# Patient Record
Sex: FEMALE | Race: WHITE | NOT HISPANIC OR LATINO | Employment: OTHER | URBAN - NONMETROPOLITAN AREA
[De-identification: names, ages, dates, MRNs, and addresses within clinical notes are randomized per-mention and may not be internally consistent; named-entity substitution may affect disease eponyms.]

---

## 2017-02-21 ENCOUNTER — HOSPITAL ENCOUNTER (OUTPATIENT)
Dept: LAB | Facility: MEDICAL CENTER | Age: 82
End: 2017-02-21
Attending: INTERNAL MEDICINE
Payer: MEDICARE

## 2017-02-21 ENCOUNTER — OFFICE VISIT (OUTPATIENT)
Dept: MEDICAL GROUP | Facility: PHYSICIAN GROUP | Age: 82
End: 2017-02-21
Payer: MEDICARE

## 2017-02-21 VITALS
HEIGHT: 62 IN | SYSTOLIC BLOOD PRESSURE: 128 MMHG | TEMPERATURE: 97.2 F | BODY MASS INDEX: 27.75 KG/M2 | DIASTOLIC BLOOD PRESSURE: 84 MMHG | HEART RATE: 67 BPM | OXYGEN SATURATION: 95 % | WEIGHT: 150.8 LBS

## 2017-02-21 DIAGNOSIS — E03.9 HYPOTHYROIDISM, UNSPECIFIED TYPE: ICD-10-CM

## 2017-02-21 DIAGNOSIS — M54.50 CHRONIC BILATERAL LOW BACK PAIN WITHOUT SCIATICA: ICD-10-CM

## 2017-02-21 DIAGNOSIS — E55.9 VITAMIN D DEFICIENCY DISEASE: ICD-10-CM

## 2017-02-21 DIAGNOSIS — I10 ESSENTIAL HYPERTENSION: ICD-10-CM

## 2017-02-21 DIAGNOSIS — G89.29 CHRONIC BILATERAL LOW BACK PAIN WITHOUT SCIATICA: ICD-10-CM

## 2017-02-21 DIAGNOSIS — M25.562 CHRONIC PAIN OF LEFT KNEE: ICD-10-CM

## 2017-02-21 DIAGNOSIS — G89.29 CHRONIC PAIN OF LEFT KNEE: ICD-10-CM

## 2017-02-21 DIAGNOSIS — C91.10 CLL (CHRONIC LYMPHOCYTIC LEUKEMIA) (HCC): ICD-10-CM

## 2017-02-21 LAB
25(OH)D3 SERPL-MCNC: 31 NG/ML (ref 30–100)
ALBUMIN SERPL BCP-MCNC: 3.6 G/DL (ref 3.2–4.9)
ALBUMIN/GLOB SERPL: 1 G/DL
ALP SERPL-CCNC: 57 U/L (ref 30–99)
ALT SERPL-CCNC: 5 U/L (ref 2–50)
ANION GAP SERPL CALC-SCNC: 4 MMOL/L (ref 0–11.9)
AST SERPL-CCNC: 14 U/L (ref 12–45)
BILIRUB SERPL-MCNC: 0.4 MG/DL (ref 0.1–1.5)
BUN SERPL-MCNC: 18 MG/DL (ref 8–22)
CALCIUM SERPL-MCNC: 9.3 MG/DL (ref 8.5–10.5)
CHLORIDE SERPL-SCNC: 104 MMOL/L (ref 96–112)
CO2 SERPL-SCNC: 30 MMOL/L (ref 20–33)
CREAT SERPL-MCNC: 1 MG/DL (ref 0.5–1.4)
GLOBULIN SER CALC-MCNC: 3.7 G/DL (ref 1.9–3.5)
GLUCOSE SERPL-MCNC: 83 MG/DL (ref 65–99)
LDH SERPL L TO P-CCNC: 145 U/L (ref 107–266)
POTASSIUM SERPL-SCNC: 4.2 MMOL/L (ref 3.6–5.5)
PROT SERPL-MCNC: 7.3 G/DL (ref 6–8.2)
SODIUM SERPL-SCNC: 138 MMOL/L (ref 135–145)
T4 FREE SERPL-MCNC: 1.09 NG/DL (ref 0.53–1.43)
TSH SERPL DL<=0.005 MIU/L-ACNC: 2.65 UIU/ML (ref 0.3–3.7)
URATE SERPL-MCNC: 6.6 MG/DL (ref 1.9–8.2)

## 2017-02-21 PROCEDURE — 1036F TOBACCO NON-USER: CPT | Performed by: INTERNAL MEDICINE

## 2017-02-21 PROCEDURE — G8432 DEP SCR NOT DOC, RNG: HCPCS | Performed by: INTERNAL MEDICINE

## 2017-02-21 PROCEDURE — 36415 COLL VENOUS BLD VENIPUNCTURE: CPT

## 2017-02-21 PROCEDURE — 82784 ASSAY IGA/IGD/IGG/IGM EACH: CPT

## 2017-02-21 PROCEDURE — 99214 OFFICE O/P EST MOD 30 MIN: CPT | Performed by: INTERNAL MEDICINE

## 2017-02-21 PROCEDURE — 84443 ASSAY THYROID STIM HORMONE: CPT

## 2017-02-21 PROCEDURE — G8484 FLU IMMUNIZE NO ADMIN: HCPCS | Performed by: INTERNAL MEDICINE

## 2017-02-21 PROCEDURE — 82306 VITAMIN D 25 HYDROXY: CPT

## 2017-02-21 PROCEDURE — 83615 LACTATE (LD) (LDH) ENZYME: CPT

## 2017-02-21 PROCEDURE — 82785 ASSAY OF IGE: CPT

## 2017-02-21 PROCEDURE — 82784 ASSAY IGA/IGD/IGG/IGM EACH: CPT | Mod: 91

## 2017-02-21 PROCEDURE — 1101F PT FALLS ASSESS-DOCD LE1/YR: CPT | Performed by: INTERNAL MEDICINE

## 2017-02-21 PROCEDURE — 4040F PNEUMOC VAC/ADMIN/RCVD: CPT | Mod: 8P | Performed by: INTERNAL MEDICINE

## 2017-02-21 PROCEDURE — G8420 CALC BMI NORM PARAMETERS: HCPCS | Performed by: INTERNAL MEDICINE

## 2017-02-21 PROCEDURE — 84550 ASSAY OF BLOOD/URIC ACID: CPT

## 2017-02-21 PROCEDURE — 80053 COMPREHEN METABOLIC PANEL: CPT

## 2017-02-21 PROCEDURE — 84439 ASSAY OF FREE THYROXINE: CPT

## 2017-02-21 NOTE — MR AVS SNAPSHOT
"        JITENDRA Seed   2017 10:40 AM   Office Visit   MRN: [id number]    Department:  [facility]   Dept Phone:  [phone]    Description:  Female : 1934   Provider:  Nina Smith M.D.           Reason for Visit     Follow-Up CLL      Allergies as of 2017     No Known Allergies      You were diagnosed with     Hypothyroidism, unspecified type   [0189220]       Essential hypertension   [6782392]       Vitamin D deficiency disease   [055242]       CLL (chronic lymphocytic leukemia) (CMS-HCC)   [415467]       Chronic bilateral low back pain without sciatica   [6969149]       Chronic pain of left knee   [746840]         Vital Signs     Blood Pressure Pulse Temperature Height Weight Body Mass Index    128/84 mmHg 67 36.2 °C (97.2 °F) 1.575 m (5' 2\") 68.402 kg (150 lb 12.8 oz) 27.57 kg/m2    Oxygen Saturation Smoking Status                95% Former Smoker          Basic Information     Date Of Birth Sex Race Ethnicity Preferred Language    1934 Female White Non- English      Problem List              ICD-10-CM Priority Class Noted - Resolved    Glaucoma H40.9   2015 - Present    Hypertension I10   2015 - Present    Hypothyroidism E03.9   2015 - Present    HSV (herpes simplex virus) infection B00.9   2015 - Present    Osteoarthritis M19.90   2015 - Present    Fall W19.XXXA   3/9/2015 - Present    Hyperlipidemia E78.5   2015 - Present    Renal insufficiency N28.9   2015 - Present    CKD (chronic kidney disease) N18.9   2016 - Present    Vitamin D deficiency disease E55.9   2016 - Present    Post-nasal drip R09.82   2016 - Present    Fatigue due to depression F32.9, R53.83   3/19/2016 - Present    CLL (chronic lymphocytic leukemia) (CMS-HCC) C91.10   3/22/2016 - Present    Left knee pain M25.562   3/29/2016 - Present    Chronic fatigue R53.82   3/29/2016 - Present    Chronic back pain M54.9, G89.29   2016 - Present      "   Health Maintenance        Date Due Completion Dates    IMM DTaP/Tdap/Td Vaccine (1 - Tdap) 11/19/1953 ---    IMM ZOSTER VACCINE 11/19/1994 ---    BONE DENSITY 11/19/1999 ---    IMM PNEUMOCOCCAL 65+ (ADULT) HIGH/HIGHEST RISK SERIES (1 of 2 - PCV13) 11/19/1999 ---    IMM INFLUENZA (1) 9/1/2016 ---            Current Immunizations     No immunizations on file.      Below and/or attached are the medications your provider expects you to take. Review all of your home medications and newly ordered medications with your provider and/or pharmacist. Follow medication instructions as directed by your provider and/or pharmacist. Please keep your medication list with you and share with your provider. Update the information when medications are discontinued, doses are changed, or new medications (including over-the-counter products) are added; and carry medication information at all times in the event of emergency situations     Allergies:  No Known Allergies          Medications  Valid as of: February 21, 2017 - 11:05 AM    Generic Name Brand Name Tablet Size Instructions for use    Acyclovir (Ointment) ZOVIRAX 5 % Apply 1 Application to affected area(s) every 3 hours.        Azithromycin (Tab) ZITHROMAX 250 MG Use as package directs        Cholecalciferol (Tab) cholecalciferol 1000 UNIT Take 4,000 Units by mouth every day.        Cyanocobalamin   Take  by mouth.        Ergocalciferol (Cap) DRISDOL 83028 UNIT Take 1 Cap by mouth every 7 days.        Estrogens, Conjugated (Cream) PREMARIN 0.625 MG/GM         Guaifenesin-Codeine (Solution) ROBITUSSIN -10 mg/5mL Take 5 mL by mouth at bedtime as needed for Cough.        Hydrocodone-Acetaminophen (Tab) NORCO 5-325 MG Take 1 Tab by mouth every 8 hours as needed (severe pain).        Levothyroxine Sodium (Tab) SYNTHROID 50 MCG Take 1 Tab by mouth every day.        Levothyroxine Sodium (Tab) SYNTHROID 75 MCG Take 1 Tab by mouth Every morning on an empty stomach.         Lisinopril (Tab) PRINIVIL 20 MG Take 1 Tab by mouth every day.        Lysine HCl (Tab) lysine 500 MG Take 500 mg by mouth every day.        Timolol Maleate (Solution) TIMOPTIC 0.25 % Place 1 Drop in both eyes 2 times a day.        Timolol Maleate (Solution) TIMOPTIC 0.5 %         Travoprost (Solution) TRAVATAN Z 0.004 %         .                 Medicines prescribed today were sent to:     [facility] [place] - [address] [address]    Phone: [phone] Fax: [phone]    Open 24 Hours?: No      Medication refill instructions:       If your prescription bottle indicates you have medication refills left, it is not necessary to call your provider’s office. Please contact your pharmacy and they will refill your medication.    If your prescription bottle indicates you do not have any refills left, you may request refills at any time through one of the following ways: The online [facility] system (except Urgent Care), by calling your provider’s office, or by asking your pharmacy to contact your provider’s office with a refill request. Medication refills are processed only during regular business hours and may not be available until the next business day. Your provider may request additional information or to have a follow-up visit with you prior to refilling your medication.   *Please Note: Medication refills are assigned a new Rx number when refilled electronically. Your pharmacy may indicate that no refills were authorized even though a new prescription for the same medication is available at the pharmacy. Please request the medicine by name with the pharmacy before contacting your provider for a refill.        Your To Do List     Future Labs/Procedures Complete By Expires    COMP METABOLIC PANEL  As directed 2/21/2018    FREE THYROXINE  As directed 2/21/2018    LDH  As directed 2/21/2018    TSH  As directed 2/21/2018    URIC ACID  As directed 2/21/2018    VITAMIN D,25 HYDROXY  As  directed 2/21/2018      Instructions    1. Have nonfasting labs checked today.    2. Follow up in 6 months of sooner as needed.    3. Dr. Smith will fax labs to Dr. Miller.          MyChart Status: Patient Declined

## 2017-02-21 NOTE — PATIENT INSTRUCTIONS
1. Have nonfasting labs checked today.    2. Follow up in 6 months of sooner as needed.    3. Dr. Smith will fax labs to Dr. Miller.

## 2017-02-21 NOTE — ASSESSMENT & PLAN NOTE
At her last visit, patient was having some back pain. She was given #15 tabs of hydrocodone. She notes that she has stopped doing activities that bother her back and since then her pain is much improved.

## 2017-02-21 NOTE — ASSESSMENT & PLAN NOTE
Patient has left knee osteoarthritis and has follow-up with Dr. Alejo orthopedics in the past. She notes that she's had some episodic worsening pain that that has improved over the past year. Patient was recommended to follow-up with Dr. Alejo if needed.

## 2017-02-21 NOTE — ASSESSMENT & PLAN NOTE
Patient has CLL which has been followed by Dr. Miller. She was posted see him today but she does not have the money for co-pay for the appointment. We discussed checking the labs that he requested back in August and faxing a copy to him.

## 2017-02-21 NOTE — PROGRESS NOTES
Chief Complaint   Patient presents with   • Follow-Up     CLL       HISTORY OF PRESENT ILLNESS: Patient is a 82 y.o. female established patient who presents today to be seen for acute and chronic issues.    Left knee pain  Patient has left knee osteoarthritis and has follow-up with Dr. Alejo orthopedics in the past. She notes that she's had some episodic worsening pain that that has improved over the past year. Patient was recommended to follow-up with Dr. Alejo if needed.    Chronic back pain  At her last visit, patient was having some back pain. She was given #15 tabs of hydrocodone. She notes that she has stopped doing activities that bother her back and since then her pain is much improved.    CLL (chronic lymphocytic leukemia) (CMS-HCC)  Patient has CLL which has been followed by Dr. Miller. She was posted see him today but she does not have the money for co-pay for the appointment. We discussed checking the labs that he requested back in August and faxing a copy to him.    Hypertension  This is a chronic condition which is well controlled on medications. Patient is tolerating medications without side effects.    Hypothyroidism  This is a chronic condition which is well controlled on medications. Patient is tolerating medications without side effects.    Vitamin D deficiency disease  This is a chronic condition which is well controlled on medications. Patient is tolerating medications without side effects.      Patient Active Problem List    Diagnosis Date Noted   • Chronic back pain 08/18/2016   • Left knee pain 03/29/2016   • Chronic fatigue 03/29/2016   • CLL (chronic lymphocytic leukemia) (CMS-HCC) 03/22/2016   • Fatigue due to depression 03/19/2016   • CKD (chronic kidney disease) 02/18/2016   • Vitamin D deficiency disease 02/18/2016   • Post-nasal drip 02/18/2016   • Hyperlipidemia 08/18/2015   • Renal insufficiency 08/18/2015   • Fall 03/09/2015   • Glaucoma 02/17/2015   • Hypertension 02/17/2015   •  Hypothyroidism 02/17/2015   • HSV (herpes simplex virus) infection 02/17/2015   • Osteoarthritis 02/17/2015       Allergies:Review of patient's allergies indicates no known allergies.    Current Outpatient Prescriptions Ordered in [facility]   Medication Sig Dispense Refill   • levothyroxine (SYNTHROID) 50 MCG Tab Take 1 Tab by mouth every day. 90 Tab 3   • lisinopril (PRINIVIL) 20 MG Tab Take 1 Tab by mouth every day. 90 Tab 3   • timolol (TIMOPTIC) 0.5 % Solution      • timolol (TIMOPTIC) 0.25 % Solution Place 1 Drop in both eyes 2 times a day.     • ergocalciferol (DRISDOL) 16980 UNIT capsule Take 1 Cap by mouth every 7 days. 12 Cap 0   • Cyanocobalamin (VITAMIN B 12 PO) Take  by mouth.     • vitamin D (CHOLECALCIFEROL) 1000 UNIT Tab Take 4,000 Units by mouth every day.     • lysine 500 MG TABS Take 500 mg by mouth every day.     • TRAVATAN Z 0.004 % SOLN      • azithromycin (ZITHROMAX) 250 MG Tab Use as package directs 6 Tab 0   • guaifenesin-codeine (ROBITUSSIN AC) Solution oral solution Take 5 mL by mouth at bedtime as needed for Cough. 120 mL 0   • levothyroxine (SYNTHROID) 75 MCG Tab Take 1 Tab by mouth Every morning on an empty stomach. 90 Tab 3   • hydrocodone-acetaminophen (NORCO) 5-325 MG Tab per tablet Take 1 Tab by mouth every 8 hours as needed (severe pain). 30 Tab 0   • PREMARIN 0.625 MG/GM CREA      • acyclovir (ZOVIRAX) 5 % OINT Apply 1 Application to affected area(s) every 3 hours.       No current [facility]-ordered facility-administered medications on file.       Past Medical History   Diagnosis Date   • H/O scarlet fever      age 10   • Hypertension    • Hyperlipidemia    • HSV-1 infection    • HSV-2 infection    • CLL (chronic lymphocytic leukemia) (CMS-[facility]) 3/22/2016       Social History   Substance Use Topics   • Smoking status: Former Smoker -- 0.25 packs/day for 51 years     Types: Cigarettes   • Smokeless tobacco: Never Used   • Alcohol Use: No      Comment: h/o of alcohol abuse       Family Status  "  Relation Status Death Age   • Mother     • Father     • Sister Alive      Family History   Problem Relation Age of Onset   • Heart Disease Father    • Heart Attack Father    • Alcohol/Drug Father    • Cancer Sister      skin, not melanoma   • Diabetes Neg Hx    • Stroke Neg Hx        ROS:  Review of Systems   Constitutional: Negative for fever and malaise/fatigue.   HENT: Negative for congestion  Respiratory: Negative for cough  Cardiovascular: Negative for chest pain  Gastrointestinal: Negative for nausea, vomiting and abdominal pain.  Musculoskeletal: **Positive for episodic left knee pain  All other systems reviewed and are negative except as in HPI.      Exam:  Blood pressure 128/84, pulse 67, temperature 36.2 °C (97.2 °F), height 1.575 m (5' 2\"), weight 68.402 kg (150 lb 12.8 oz), SpO2 95 %.  General:  Well nourished, well developed elderly female in NAD  Head is grossly normal.  Neck: Supple without JVD   Pulmonary: Clear to ausculation and percussion.  Normal effort. No rales, ronchi, or wheezing.  Cardiovascular: Regular rate and rhythm without murmur. Carotid and radial pulses are intact and equal bilaterally.  Extremities: no clubbing, cyanosis, or edema.        Assessment/Plan:  1. Hypothyroidism, unspecified type  TSH    FREE THYROXINE    Controlled, on medication   2. Essential hypertension  COMP METABOLIC PANEL    Controlled, on medication   3. Vitamin D deficiency disease  VITAMIN D,25 HYDROXY    Control, on vitamin D   4. CLL (chronic lymphocytic leukemia) (CMS-[facility])  URIC ACID    LDH    IMMUNOGLOBULINS A/E/G/M, SERUM    Control, will have labs checked   5. Chronic bilateral low back pain without sciatica      Control, symptoms have improved   6. Chronic pain of left knee      Uncontrolled, will see orthopedics as needed     Please note that this dictation was created using voice recognition software. I have made every reasonable attempt to correct obvious errors, but I expect that " there are errors of grammar and possibly content that I did not discover before finalizing the note.

## 2017-02-23 ENCOUNTER — TELEPHONE (OUTPATIENT)
Dept: MEDICAL GROUP | Facility: PHYSICIAN GROUP | Age: 82
End: 2017-02-23

## 2017-02-23 LAB
IGA SERPL-MCNC: 241 MG/DL (ref 68–408)
IGE SERPL-ACNC: <2 KU/L
IGG SERPL-MCNC: 1120 MG/DL (ref 768–1632)
IGM SERPL-MCNC: 84 MG/DL (ref 35–263)

## 2017-03-01 ENCOUNTER — TELEPHONE (OUTPATIENT)
Dept: MEDICAL GROUP | Facility: PHYSICIAN GROUP | Age: 82
End: 2017-03-01

## 2017-04-10 ENCOUNTER — OFFICE VISIT (OUTPATIENT)
Dept: MEDICAL GROUP | Facility: PHYSICIAN GROUP | Age: 82
End: 2017-04-10
Payer: MEDICARE

## 2017-04-10 ENCOUNTER — HOSPITAL ENCOUNTER (OUTPATIENT)
Dept: LAB | Facility: MEDICAL CENTER | Age: 82
End: 2017-04-10
Attending: FAMILY MEDICINE
Payer: MEDICARE

## 2017-04-10 VITALS
BODY MASS INDEX: 27.6 KG/M2 | SYSTOLIC BLOOD PRESSURE: 110 MMHG | WEIGHT: 150 LBS | OXYGEN SATURATION: 98 % | DIASTOLIC BLOOD PRESSURE: 72 MMHG | RESPIRATION RATE: 16 BRPM | HEIGHT: 62 IN | HEART RATE: 68 BPM | TEMPERATURE: 97 F

## 2017-04-10 DIAGNOSIS — M15.9 PRIMARY OSTEOARTHRITIS INVOLVING MULTIPLE JOINTS: Primary | ICD-10-CM

## 2017-04-10 DIAGNOSIS — M75.42 IMPINGEMENT SYNDROME OF SHOULDER, LEFT: ICD-10-CM

## 2017-04-10 DIAGNOSIS — M15.9 PRIMARY OSTEOARTHRITIS INVOLVING MULTIPLE JOINTS: ICD-10-CM

## 2017-04-10 LAB — ERYTHROCYTE [SEDIMENTATION RATE] IN BLOOD BY WESTERGREN METHOD: 50 MM/HOUR (ref 0–30)

## 2017-04-10 PROCEDURE — 99214 OFFICE O/P EST MOD 30 MIN: CPT | Performed by: FAMILY MEDICINE

## 2017-04-10 PROCEDURE — 36415 COLL VENOUS BLD VENIPUNCTURE: CPT

## 2017-04-10 PROCEDURE — G8432 DEP SCR NOT DOC, RNG: HCPCS | Performed by: FAMILY MEDICINE

## 2017-04-10 PROCEDURE — 4040F PNEUMOC VAC/ADMIN/RCVD: CPT | Mod: 8P | Performed by: FAMILY MEDICINE

## 2017-04-10 PROCEDURE — 1101F PT FALLS ASSESS-DOCD LE1/YR: CPT | Performed by: FAMILY MEDICINE

## 2017-04-10 PROCEDURE — 85652 RBC SED RATE AUTOMATED: CPT

## 2017-04-10 PROCEDURE — G8419 CALC BMI OUT NRM PARAM NOF/U: HCPCS | Performed by: FAMILY MEDICINE

## 2017-04-10 PROCEDURE — 1036F TOBACCO NON-USER: CPT | Performed by: FAMILY MEDICINE

## 2017-04-10 RX ORDER — HYDROCODONE BITARTRATE AND ACETAMINOPHEN 5; 325 MG/1; MG/1
1 TABLET ORAL 2 TIMES DAILY PRN
Qty: 30 TAB | Refills: 0 | Status: SHIPPED | OUTPATIENT
Start: 2017-04-10 | End: 2017-05-16 | Stop reason: SDUPTHER

## 2017-04-10 RX ORDER — CYCLOSPORINE 0.5 MG/ML
1 EMULSION OPHTHALMIC 2 TIMES DAILY
COMMUNITY
End: 2019-07-11

## 2017-04-10 RX ORDER — ACETAMINOPHEN 500 MG
500-1000 TABLET ORAL 3 TIMES DAILY PRN
Qty: 90 TAB | Refills: 1 | Status: SHIPPED | OUTPATIENT
Start: 2017-04-10

## 2017-04-10 ASSESSMENT — PATIENT HEALTH QUESTIONNAIRE - PHQ9: CLINICAL INTERPRETATION OF PHQ2 SCORE: 2

## 2017-04-10 NOTE — PROGRESS NOTES
Subjective:   CC: shoulder/hip pain    HPI:     JITENDRA Talley is a 82 y.o. female, established patient of the clinic, presents with the following concerns:     Pt has hx of osteoarthritis in shoulders, hips, ankles, and knees. She usually managed her symptoms without pain medication. Dr. Smith gives her a Rx for Norco x 15 pills, which usually last her for 1-2 years. Pt states that she does not take medication unless she is in tears.   Four weeks ago, patient develops severe bilateral shoulder and hip pain w/o hx of trauma.   Right shoulder pain is chronic, s/p surgical repair for rotator cuff tear years ago, she thinks that right shoulder pain is getting worse.   Left shoulder pain is new and severe, unable to raise her arm, worse with laying down and certain movement of the shoulder, better with rest.   Bilateral hip pain is new. Pain is described as sharp, non-radiating, most located at the trochanteric heads, worse with walking, better with sitting still.  She also c/o pain in multiple joints of her hands bilaterally. Denies hand weakness, endorses right hand numbness and tingling at night. Denies pain with right wrist, elbow, or neck.   Pt is seeing Dr. Alejo for pain related to osteoarthritis.   She had right knee replacement by Dr. Alejo couple years ago.     Current medicines (including changes today)  Current Outpatient Prescriptions   Medication Sig Dispense Refill   • cyclosporin (RESTASIS) 0.05 % ophthalmic emulsion Place 1 Drop in both eyes 2 times a day.     • acetaminophen (TYLENOL) 500 MG Tab Take 1-2 Tabs by mouth 3 times a day as needed for Moderate Pain. 90 Tab 1   • hydrocodone-acetaminophen (NORCO) 5-325 MG Tab per tablet Take 1 Tab by mouth 2 times a day as needed (severe pain). 30 Tab 0   • Diclofenac Sodium 1 % Gel Apply to 2-4 gram to affected area 4 times daily 100 g 2   • levothyroxine (SYNTHROID) 50 MCG Tab Take 1 Tab by mouth every day. 90 Tab 3   • lisinopril (PRINIVIL) 20 MG Tab Take 1  "Tab by mouth every day. 90 Tab 3   • timolol (TIMOPTIC) 0.25 % Solution Place 1 Drop in both eyes 2 times a day.     • ergocalciferol (DRISDOL) 86172 UNIT capsule Take 1 Cap by mouth every 7 days. 12 Cap 0   • Cyanocobalamin (VITAMIN B 12 PO) Take  by mouth.     • vitamin D (CHOLECALCIFEROL) 1000 UNIT Tab Take 4,000 Units by mouth every day.     • lysine 500 MG TABS Take 500 mg by mouth every day.     • PREMARIN 0.625 MG/GM CREA      • TRAVATAN Z 0.004 % SOLN      • acyclovir (ZOVIRAX) 5 % OINT Apply 1 Application to affected area(s) every 3 hours.       No current facility-administered medications for this visit.     She  has a past medical history of H/O scarlet fever; Hypertension; Hyperlipidemia; HSV-1 infection; HSV-2 infection; and CLL (chronic lymphocytic leukemia) (CMS-[facility]) (3/22/2016). She also has no past medical history of Seizure (CMS-HCC), Heart murmur, Heart attack (CMS-HCC), Stroke (CMS-HCC), Type II or unspecified type diabetes mellitus without mention of complication, not stated as uncontrolled, Clotting disorder (CMS-HCC), Asthma, or Arrhythmia.    I personally reviewed patient's problem list, allergies, medications, family hx, social hx with patient and update [facility].     REVIEW OF SYSTEMS:  CONSTITUTIONAL:  Denies night sweats, fatigue, malaise, lethargy, fever or chills.  RESPIRATORY:  Denies cough, wheeze, hemoptysis, or shortness of breath.  CARDIOVASCULAR:  Denies chest pains, palpitations, pedal edema  GASTROINTESTINAL:  Denies abdominal pain, nausea or vomiting, diarrhea, constipation, hematemesis, hematochezia, melena.  GENITOURINARY:  Denies urinary urgency, frequency, dysuria, or hematuria.       Objective:     Blood pressure 110/72, pulse 68, temperature 36.1 °C (97 °F), resp. rate 16, height 1.575 m (5' 2.01\"), weight 68.04 kg (150 lb), SpO2 98 %. Body mass index is 27.43 kg/(m^2).    Physical Exam:  Constitutional: awake, alert, in no distress.  Skin: Warm, dry, good turgor, no rashes, " bruises, ulcers in visible areas.  Eye: conjunctiva clear, lids neg for edema or lesions.  Neck: Trachea midline, no masses, no thyromegaly. No cervical or supraclavicular lymphadenopathy  Respiratory: Unlabored respiratory effort, lungs clear to auscultation, no wheezes, no rhonchi.  Cardiovascular: Normal S1, S2, no murmur, no pedal edema.  Abdomen: Soft, non-tender to palpation, no hernia, no hepatosplenomegaly.  Neuro: CN2-12 grossly intact. Strength 5/5, reflexes 2/4 in all extremities, no sensory deficits.   Psych: Oriented x3, affect and mood wnl, intact judgement and insight.   MSK:   Physical Exam   Musculoskeletal:        Right shoulder: She exhibits decreased range of motion, tenderness and crepitus. She exhibits no bony tenderness, no swelling and no effusion.        Left shoulder: She exhibits decreased range of motion, tenderness and crepitus. She exhibits no bony tenderness, no swelling and no effusion.        Right wrist: She exhibits decreased range of motion. She exhibits no tenderness, no bony tenderness, no swelling, no effusion, no crepitus, no deformity and no laceration.        Right hip: She exhibits decreased range of motion, tenderness and bony tenderness. She exhibits normal strength, no swelling, no crepitus, no deformity and no laceration.        Left hip: She exhibits decreased range of motion, tenderness, bony tenderness and crepitus. She exhibits normal strength, no swelling, no deformity and no laceration.      Positive empty can test and Neer's impingement test on the left.   Elbows exam unremarkable  Negative Tinel and Phalen's bilaterally.       Assessment and Plan:   The following treatment plan was discussed    1. Primary osteoarthritis involving multiple joints  Hx and exam consistent with Osteoarthritis of the shoulders, hips, and small joints of the hands.   Given acute development of bilateral hips and shoulders pain, will check ESR to r/o PMR.   Defer all imagings due to  pt's financial concerns.   Plan:   - acetaminophen (TYLENOL) 500 MG Tab; Take 1-2 Tabs by mouth 3 times a day as needed for Moderate Pain.  Dispense: 90 Tab; Refill: 1  - hydrocodone-acetaminophen (NORCO) 5-325 MG Tab per tablet; Take 1 Tab by mouth 2 times a day as needed (severe pain).  Dispense: 30 Tab; Refill: 0 (for pain flare only). Risks, benefits, and side effects discussed with patient. Discussed risks of oversedation given advanced age. Pt states that she has never had any side effects with Norco including constipation.   - WESTERGREN SED RATE; Future  - REFERRAL TO PHYSICAL THERAPY Reason for Therapy: Eval/Treat/Report  - Diclofenac Sodium 1 % Gel; Apply to 2-4 gram to affected area 4 times daily  Dispense: 100 g; Refill: 2  - given advanced age and CKD, will avoid NSAID  - f/u with ortho    2. Impingement syndrome of shoulder, left  Left shoulder exam consistent with rotator cuff pathology or impingement syndrome. Plan:   - acetaminophen (TYLENOL) 500 MG Tab; Take 1-2 Tabs by mouth 3 times a day as needed for Moderate Pain.  Dispense: 90 Tab; Refill: 1  - hydrocodone-acetaminophen (NORCO) 5-325 MG Tab per tablet; Take 1 Tab by mouth 2 times a day as needed (severe pain).  Dispense: 30 Tab; Refill: 0.  Risks, benefits, and side effects discussed with patient. Discussed risks of oversedation given advanced age. Pt states that she has never had any side effects with Norco including constipation.   - REFERRAL TO PHYSICAL THERAPY Reason for Therapy: Eval/Treat/Report  - Diclofenac Sodium 1 % Gel; Apply to 2-4 gram to affected area 4 times daily  Dispense: 100 g; Refill: 2  - given advanced age and CKD, will avoid NSAID  - f/u with ortho, might need steroid injection if symptoms fail to improve.   - f/u with new PCP in 3 months.       Alka Dennis M.D.      Followup: Return in about 3 months (around 7/10/2017) for Multiple issues, Long.    Please note that this dictation was created using voice recognition  software. I have made every reasonable attempt to correct obvious errors, but I expect that there are errors of grammar and possibly content that I did not discover before finalizing the note.

## 2017-04-10 NOTE — MR AVS SNAPSHOT
"        JITENDRA Seed   4/10/2017 2:00 PM   Office Visit   MRN: [id number]    Department:  [facility]   Dept Phone:  [phone]    Description:  Female : 1934   Provider:  Alka Dennis M.D.           Reason for Visit     Medication Refill Norco      Allergies as of 4/10/2017     No Known Allergies      You were diagnosed with     Primary osteoarthritis involving multiple joints   [1439886]   Uncontrolled, very rare use of hydrocodone    Impingement syndrome of shoulder, left   [[id number]]         Vital Signs     Blood Pressure Pulse Temperature Respirations Height Weight    110/72 mmHg 68 36.1 °C (97 °F) 16 1.575 m (5' 2.01\") 68.04 kg (150 lb)    Body Mass Index Oxygen Saturation Smoking Status             27.43 kg/m2 98% Former Smoker         Basic Information     Date Of Birth Sex Race Ethnicity Preferred Language    1934 Female White Non- English      Your appointments     Jul 10, 2017  2:20 PM   Established Patient with Bea Elizabeth M.D.   [address] [address] [phone]           You will be receiving a confirmation call a few days before your appointment from our automated call confirmation system.              Problem List              ICD-10-CM Priority Class Noted - Resolved    Glaucoma H40.9   2015 - Present    Hypertension I10   2015 - Present    Hypothyroidism E03.9   2015 - Present    HSV (herpes simplex virus) infection B00.9   2015 - Present    Osteoarthritis M19.90   2015 - Present    Fall W19.XXXA   3/9/2015 - Present    Hyperlipidemia E78.5   2015 - Present    Renal insufficiency N28.9   2015 - Present    CKD (chronic kidney disease) N18.9   2016 - Present    Vitamin D deficiency disease E55.9   2016 - Present    Post-nasal drip R09.82   2016 - Present    Fatigue due to depression F32.9, R53.83   3/19/2016 - Present    CLL (chronic lymphocytic " leukemia) (CMS-HCC) C91.10   3/22/2016 - Present    Left knee pain M25.562   3/29/2016 - Present    Chronic fatigue R53.82   3/29/2016 - Present    Chronic back pain M54.9, G89.29   8/18/2016 - Present      Health Maintenance        Date Due Completion Dates    IMM DTaP/Tdap/Td Vaccine (1 - Tdap) 11/19/1953 ---    IMM ZOSTER VACCINE 11/19/1994 ---    BONE DENSITY 11/19/1999 ---    IMM PNEUMOCOCCAL 65+ (ADULT) HIGH/HIGHEST RISK SERIES (1 of 2 - PCV13) 11/19/1999 ---            Current Immunizations     No immunizations on file.      Below and/or attached are the medications your provider expects you to take. Review all of your home medications and newly ordered medications with your provider and/or pharmacist. Follow medication instructions as directed by your provider and/or pharmacist. Please keep your medication list with you and share with your provider. Update the information when medications are discontinued, doses are changed, or new medications (including over-the-counter products) are added; and carry medication information at all times in the event of emergency situations     Allergies:  No Known Allergies          Medications  Valid as of: April 10, 2017 -  3:02 PM    Generic Name Brand Name Tablet Size Instructions for use    Acetaminophen (Tab) TYLENOL 500 MG Take 1-2 Tabs by mouth 3 times a day as needed for Moderate Pain.        Acyclovir (Ointment) ZOVIRAX 5 % Apply 1 Application to affected area(s) every 3 hours.        Cholecalciferol (Tab) cholecalciferol 1000 UNIT Take 4,000 Units by mouth every day.        Cyanocobalamin   Take  by mouth.        CycloSPORINE (Emulsion) RESTASIS 0.05 % Place 1 Drop in both eyes 2 times a day.        Diclofenac Sodium (Gel) Diclofenac Sodium 1 % Apply to 2-4 gram to affected area 4 times daily        Ergocalciferol (Cap) DRISDOL 37699 UNIT Take 1 Cap by mouth every 7 days.        Estrogens, Conjugated (Cream) PREMARIN 0.625 MG/GM         Hydrocodone-Acetaminophen  (Tab) NORCO 5-325 MG Take 1 Tab by mouth 2 times a day as needed (severe pain).        Levothyroxine Sodium (Tab) SYNTHROID 50 MCG Take 1 Tab by mouth every day.        Lisinopril (Tab) PRINIVIL 20 MG Take 1 Tab by mouth every day.        Lysine HCl (Tab) lysine 500 MG Take 500 mg by mouth every day.        Timolol Maleate (Solution) TIMOPTIC 0.25 % Place 1 Drop in both eyes 2 times a day.        Travoprost (Solution) TRAVATAN Z 0.004 %         .                 Medicines prescribed today were sent to:     [facility] [place], [place] - [address] [facility]    [address] [place] [address]    Phone: [phone] Fax: [phone]    Open 24 Hours?: No      Medication refill instructions:       If your prescription bottle indicates you have medication refills left, it is not necessary to call your provider’s office. Please contact your pharmacy and they will refill your medication.    If your prescription bottle indicates you do not have any refills left, you may request refills at any time through one of the following ways: The online [employer] system (except Urgent Care), by calling your provider’s office, or by asking your pharmacy to contact your provider’s office with a refill request. Medication refills are processed only during regular business hours and may not be available until the next business day. Your provider may request additional information or to have a follow-up visit with you prior to refilling your medication.   *Please Note: Medication refills are assigned a new Rx number when refilled electronically. Your pharmacy may indicate that no refills were authorized even though a new prescription for the same medication is available at the pharmacy. Please request the medicine by name with the pharmacy before contacting your provider for a refill.        Your To Do List     Future Labs/Procedures Complete By Dandy GARNER SED RATE  As directed 4/11/2018      Referral     A referral request has been  sent to our patient care coordination department. Please allow 3-5 business days for us to process this request and contact you either by phone or mail. If you do not hear from us by the 5th business day, please call us at [phone].           MyChart Status: Patient Declined

## 2017-04-14 ENCOUNTER — TELEPHONE (OUTPATIENT)
Dept: MEDICAL GROUP | Age: 82
End: 2017-04-14

## 2017-04-14 RX ORDER — PREDNISONE 20 MG/1
40 TABLET ORAL DAILY
Qty: 10 TAB | Refills: 0 | Status: SHIPPED | OUTPATIENT
Start: 2017-04-14 | End: 2017-04-19

## 2017-04-14 NOTE — TELEPHONE ENCOUNTER
Refill approved, Rx sent electronically to pharmacy.   Please inform patient.   Thanks  Alka Dennis M.D.

## 2017-04-14 NOTE — TELEPHONE ENCOUNTER
Phone Number Called: [phone]    Message: Pt notified.    Left Message for patient to call back: N\A

## 2017-04-14 NOTE — TELEPHONE ENCOUNTER
Phone Number Called: [phone]    Message: Pt called and states she spoke with Dr. Dennis after her appt 4/10 about being prescribed Prednisone.    She states rx still has not been called in.    Left Message for patient to call back: yes

## 2017-05-16 ENCOUNTER — OFFICE VISIT (OUTPATIENT)
Dept: MEDICAL GROUP | Facility: PHYSICIAN GROUP | Age: 82
End: 2017-05-16
Payer: MEDICARE

## 2017-05-16 VITALS
DIASTOLIC BLOOD PRESSURE: 80 MMHG | HEIGHT: 62 IN | WEIGHT: 150 LBS | BODY MASS INDEX: 27.6 KG/M2 | SYSTOLIC BLOOD PRESSURE: 132 MMHG | HEART RATE: 78 BPM | RESPIRATION RATE: 14 BRPM | TEMPERATURE: 97.2 F | OXYGEN SATURATION: 92 %

## 2017-05-16 DIAGNOSIS — M75.42 IMPINGEMENT SYNDROME OF SHOULDER, LEFT: ICD-10-CM

## 2017-05-16 DIAGNOSIS — M15.9 PRIMARY OSTEOARTHRITIS INVOLVING MULTIPLE JOINTS: ICD-10-CM

## 2017-05-16 PROCEDURE — G8419 CALC BMI OUT NRM PARAM NOF/U: HCPCS | Performed by: NURSE PRACTITIONER

## 2017-05-16 PROCEDURE — G8432 DEP SCR NOT DOC, RNG: HCPCS | Performed by: NURSE PRACTITIONER

## 2017-05-16 PROCEDURE — 1101F PT FALLS ASSESS-DOCD LE1/YR: CPT | Performed by: NURSE PRACTITIONER

## 2017-05-16 PROCEDURE — 99214 OFFICE O/P EST MOD 30 MIN: CPT | Performed by: NURSE PRACTITIONER

## 2017-05-16 PROCEDURE — 4040F PNEUMOC VAC/ADMIN/RCVD: CPT | Mod: 8P | Performed by: NURSE PRACTITIONER

## 2017-05-16 PROCEDURE — 1036F TOBACCO NON-USER: CPT | Performed by: NURSE PRACTITIONER

## 2017-05-16 RX ORDER — TIMOLOL MALEATE 5 MG/ML
SOLUTION/ DROPS OPHTHALMIC
COMMUNITY
Start: 2017-05-04

## 2017-05-16 RX ORDER — HYDROCODONE BITARTRATE AND ACETAMINOPHEN 5; 325 MG/1; MG/1
1 TABLET ORAL 2 TIMES DAILY PRN
Qty: 60 TAB | Refills: 0 | Status: SHIPPED | OUTPATIENT
Start: 2017-05-16 | End: 2017-10-16

## 2017-05-16 NOTE — ASSESSMENT & PLAN NOTE
Pt is reporting increase in multiple joint pain. Pain is mostly in bilateral hips, shoulders, bilateral hands. Previously, patient would treat her chronic pain with opioid pain medication and the prescription was last at least 1-2 years. Patient states for unknown reasons, her osteophyte arthritis  Of multiple joints has significantly worsened over the last few months. She was seen in office in April and was given prescription of Norco 5-325 mg as well as oral steroids, which helped control the pain but she is very concerned and wants to know why all of a sudden her pain has increased. She has not fallen but she has not suffered any injury. She does have upcoming appointment with her new physician provider to establish care. She really does not want to start taking chronic opioids that she does not have to. She does request a refill on the [place] until she can be seen by Dr. Elizabeth on June 6. I have agreed to refill her until that appointment so she can discuss whether or not she wants to initiate a pain contract. She may need further workup for a rheumatologic condition.

## 2017-05-16 NOTE — MR AVS SNAPSHOT
"        JITENDRA Seed   2017 2:00 PM   Office Visit   MRN: [id number]    Department:  [facility]   Dept Phone:  [phone]    Description:  Female : 1934   Provider:  DU Guardado           Reason for Visit     Pain hand,shoulder,hip pain x 3 months      Allergies as of 2017     No Known Allergies      You were diagnosed with     Primary osteoarthritis involving multiple joints   [6969335]   Uncontrolled, very rare use of hydrocodone    Impingement syndrome of shoulder, left   [[id number]]         Vital Signs     Blood Pressure Pulse Temperature Respirations Height Weight    132/80 mmHg 78 36.2 °C (97.2 °F) 14 1.575 m (5' 2.01\") 68.04 kg (150 lb)    Body Mass Index Oxygen Saturation Smoking Status             27.43 kg/m2 92% Former Smoker         Basic Information     Date Of Birth Sex Race Ethnicity Preferred Language    1934 Female White Non- English      Your appointments     2017  2:20 PM   Established Patient with Bea Elizabeth M.D.   [facility])    [address] [place] [address] [phone]           You will be receiving a confirmation call a few days before your appointment from our automated call confirmation system.            Jul 10, 2017  2:20 PM   Established Patient with Bea Elizabeth M.D.   [facility]    [address] [place] [address] [phone]           You will be receiving a confirmation call a few days before your appointment from our automated call confirmation system.              Problem List              ICD-10-CM Priority Class Noted - Resolved    Glaucoma H40.9   2015 - Present    Hypertension I10   2015 - Present    Hypothyroidism E03.9   2015 - Present    HSV (herpes simplex virus) infection B00.9   2015 - Present    Osteoarthritis M19.90   2015 - Present    Fall W19.XXXA   3/9/2015 - Present   " Hyperlipidemia E78.5   8/18/2015 - Present    Renal insufficiency N28.9   8/18/2015 - Present    CKD (chronic kidney disease) N18.9   2/18/2016 - Present    Vitamin D deficiency disease E55.9   2/18/2016 - Present    Post-nasal drip R09.82   2/18/2016 - Present    Fatigue due to depression F32.9, R53.83   3/19/2016 - Present    CLL (chronic lymphocytic leukemia) (CMS-HCC) C91.10   3/22/2016 - Present    Left knee pain M25.562   3/29/2016 - Present    Chronic fatigue R53.82   3/29/2016 - Present    Chronic back pain M54.9, G89.29   8/18/2016 - Present      Health Maintenance        Date Due Completion Dates    IMM DTaP/Tdap/Td Vaccine (1 - Tdap) 11/19/1953 ---    IMM ZOSTER VACCINE 11/19/1994 ---    BONE DENSITY 11/19/1999 ---    IMM PNEUMOCOCCAL 65+ (ADULT) HIGH/HIGHEST RISK SERIES (1 of 2 - PCV13) 11/19/1999 ---            Current Immunizations     No immunizations on file.      Below and/or attached are the medications your provider expects you to take. Review all of your home medications and newly ordered medications with your provider and/or pharmacist. Follow medication instructions as directed by your provider and/or pharmacist. Please keep your medication list with you and share with your provider. Update the information when medications are discontinued, doses are changed, or new medications (including over-the-counter products) are added; and carry medication information at all times in the event of emergency situations     Allergies:  No Known Allergies          Medications  Valid as of: May 16, 2017 -  2:37 PM    Generic Name Brand Name Tablet Size Instructions for use    Acetaminophen (Tab) TYLENOL 500 MG Take 1-2 Tabs by mouth 3 times a day as needed for Moderate Pain.        Acyclovir (Ointment) ZOVIRAX 5 % Apply 1 Application to affected area(s) every 3 hours.        Cholecalciferol (Tab) cholecalciferol 1000 UNIT Take 4,000 Units by mouth every day.        Cyanocobalamin   Take  by mouth.         CycloSPORINE (Emulsion) RESTASIS 0.05 % Place 1 Drop in both eyes 2 times a day.        Diclofenac Sodium (Gel) Diclofenac Sodium 1 % Apply to 2-4 gram to affected area 4 times daily        Ergocalciferol (Cap) DRISDOL 53893 UNIT Take 1 Cap by mouth every 7 days.        Estrogens, Conjugated (Cream) PREMARIN 0.625 MG/GM         Hydrocodone-Acetaminophen (Tab) NORCO 5-325 MG Take 1 Tab by mouth 2 times a day as needed (severe pain).        Levothyroxine Sodium (Tab) SYNTHROID 50 MCG Take 1 Tab by mouth every day.        Lisinopril (Tab) PRINIVIL 20 MG Take 1 Tab by mouth every day.        Lysine HCl (Tab) lysine 500 MG Take 500 mg by mouth every day.        Timolol Maleate (Solution) TIMOPTIC 0.5 %         Travoprost (Solution) TRAVATAN Z 0.004 %         .                 Medicines prescribed today were sent to:     [facility] - [address] [address]    Phone: [phone] Fax: [phone]    Open 24 Hours?: No      Medication refill instructions:       If your prescription bottle indicates you have medication refills left, it is not necessary to call your provider’s office. Please contact your pharmacy and they will refill your medication.    If your prescription bottle indicates you do not have any refills left, you may request refills at any time through one of the following ways: The online [employer] system (except Urgent Care), by calling your provider’s office, or by asking your pharmacy to contact your provider’s office with a refill request. Medication refills are processed only during regular business hours and may not be available until the next business day. Your provider may request additional information or to have a follow-up visit with you prior to refilling your medication.   *Please Note: Medication refills are assigned a new Rx number when refilled electronically. Your pharmacy may indicate that no refills were authorized even though a new prescription for  the same medication is available at the pharmacy. Please request the medicine by name with the pharmacy before contacting your provider for a refill.        Instructions    Refilled you meds    Keep upcoming appt with Dr. Glenn Prince Status: Patient Declined

## 2017-05-17 NOTE — PROGRESS NOTES
Chief Complaint   Patient presents with   • Pain     hand,shoulder,hip pain x 3 months         This is a 82 y.o.female patient that presents today with the following: Follow-up visit, osteoarthritic pain in multiple joints    Osteoarthritis  Pt is reporting increase in multiple joint pain. Pain is mostly in bilateral hips, shoulders, bilateral hands. Previously, patient would treat her chronic pain with opioid pain medication and the prescription was last at least 1-2 years. Patient states for unknown reasons, her osteophyte arthritis  Of multiple joints has significantly worsened over the last few months. She was seen in office in April and was given prescription of Norco 5-325 mg as well as oral steroids, which helped control the pain but she is very concerned and wants to know why all of a sudden her pain has increased. She has not fallen but she has not suffered any injury. She does have upcoming appointment with her new physician provider to establish care. She really does not want to start taking chronic opioids that she does not have to. She does request a refill on the [place] until she can be seen by Dr. Elizabeth on June 6. I have agreed to refill her until that appointment so she can discuss whether or not she wants to initiate a pain contract. She may need further workup for a rheumatologic condition.      No visits with results within 1 Month(s) from this visit.  Latest known visit with results is:    Hospital Outpatient Visit on 04/10/2017   Component Date Value   • Sed Rate Kylie 04/10/2017 50*         clinical course has fluctuated    Past Medical History   Diagnosis Date   • H/O scarlet fever      age 10   • Hypertension    • Hyperlipidemia    • HSV-1 infection    • HSV-2 infection    • CLL (chronic lymphocytic leukemia) (CMS-[facility]) 3/22/2016       Past Surgical History   Procedure Laterality Date   • Tonsillectomy     • Abdominal hysterectomy total     • Appendectomy     • Open reduction     • Knee  arthroplasty total       right       Family History   Problem Relation Age of Onset   • Heart Disease Father    • Heart Attack Father    • Alcohol/Drug Father    • Cancer Sister      skin, not melanoma   • Diabetes Neg Hx    • Stroke Neg Hx        Review of patient's allergies indicates no known allergies.    Current Outpatient Prescriptions Ordered in [facility]   Medication Sig Dispense Refill   • timolol (TIMOPTIC) 0.5 % Solution      • hydrocodone-acetaminophen (NORCO) 5-325 MG Tab per tablet Take 1 Tab by mouth 2 times a day as needed (severe pain). 60 Tab 0   • cyclosporin (RESTASIS) 0.05 % ophthalmic emulsion Place 1 Drop in both eyes 2 times a day.     • levothyroxine (SYNTHROID) 50 MCG Tab Take 1 Tab by mouth every day. 90 Tab 3   • lisinopril (PRINIVIL) 20 MG Tab Take 1 Tab by mouth every day. 90 Tab 3   • ergocalciferol (DRISDOL) 13727 UNIT capsule Take 1 Cap by mouth every 7 days. 12 Cap 0   • Cyanocobalamin (VITAMIN B 12 PO) Take  by mouth.     • vitamin D (CHOLECALCIFEROL) 1000 UNIT Tab Take 4,000 Units by mouth every day.     • lysine 500 MG TABS Take 500 mg by mouth every day.     • TRAVATAN Z 0.004 % SOLN      • acyclovir (ZOVIRAX) 5 % OINT Apply 1 Application to affected area(s) every 3 hours.     • acetaminophen (TYLENOL) 500 MG Tab Take 1-2 Tabs by mouth 3 times a day as needed for Moderate Pain. 90 Tab 1   • Diclofenac Sodium 1 % Gel Apply to 2-4 gram to affected area 4 times daily 100 g 2   • PREMARIN 0.625 MG/GM CREA        No current Epic-ordered facility-administered medications on file.       Constitutional ROS: No unexpected change in weight, No weakness, No unexplained fevers, sweats, or chills  Pulmonary ROS: No chronic cough, sputum, or hemoptysis, No shortness of breath, No recent change in breathing  Cardiovascular ROS: No chest pain, No edema, No palpitations  Gastrointestinal ROS: No abdominal pain, No nausea, vomiting, diarrhea, or constipation, no blood in  "stool  Musculoskeletal/Extremities ROS: Positive per history of present illness  Neurologic ROS: Normal development, No seizures, No weakness    Physical exam:  /80 mmHg  Pulse 78  Temp(Src) 36.2 °C (97.2 °F)  Resp 14  Ht 1.575 m (5' 2.01\")  Wt 68.04 kg (150 lb)  BMI 27.43 kg/m2  SpO2 92%  General Appearance: Elderly female, alert, no distress, moderately overweight, well-groomed  Skin: Skin color, texture, turgor normal. No rashes or lesions.  Lungs: negative findings: normal respiratory rate and rhythm, lungs clear to auscultation  Heart: negative. RRR without murmur, gallop, or rubs.  No ectopy.  Abdomen: Abdomen soft, non-tender. BS normal. No masses,  No organomegaly  Musculoskeletal: positive findings: Decreased range of motion to multiple joints due to pain  Neurologic: intact, oriented, mood appropriate, judgment intact. Cranial nerves II-12 grossly intact    Medical decision making/discussion: Patient here for follow-up visit, chronic pain associated with worsening osteoarthritis. She does set up an appointment to establish care with her new physician provider in early June. I will refill her [place] until that appointment until they can decide whether or not she wants to continue with chronic pain management. She is to take medications as prescribed, we discussed the risks, benefits and side effects of medication.    JITENDRA was seen today for pain.    Diagnoses and all orders for this visit:    Primary osteoarthritis involving multiple joints  Comments:  Uncontrolled, very rare use of hydrocodone  Orders:  -     hydrocodone-acetaminophen (NORCO) 5-325 MG Tab per tablet; Take 1 Tab by mouth 2 times a day as needed (severe pain).    Impingement syndrome of shoulder, left  -     hydrocodone-acetaminophen (NORCO) 5-325 MG Tab per tablet; Take 1 Tab by mouth 2 times a day as needed (severe pain).          Please note that this dictation was created using voice recognition software. I have made every " reasonable attempt to correct obvious errors, but I expect that there are errors of grammar and possibly content that I did not discover before finalizing the note.

## 2017-05-29 ENCOUNTER — OFFICE VISIT (OUTPATIENT)
Dept: URGENT CARE | Facility: PHYSICIAN GROUP | Age: 82
End: 2017-05-29
Payer: MEDICARE

## 2017-05-29 ENCOUNTER — APPOINTMENT (OUTPATIENT)
Dept: RADIOLOGY | Facility: IMAGING CENTER | Age: 82
End: 2017-05-29
Attending: PHYSICIAN ASSISTANT
Payer: MEDICARE

## 2017-05-29 VITALS
RESPIRATION RATE: 16 BRPM | DIASTOLIC BLOOD PRESSURE: 80 MMHG | HEART RATE: 86 BPM | TEMPERATURE: 98.4 F | OXYGEN SATURATION: 97 % | SYSTOLIC BLOOD PRESSURE: 130 MMHG | WEIGHT: 151 LBS | BODY MASS INDEX: 27.61 KG/M2

## 2017-05-29 DIAGNOSIS — M25.50 PAIN, JOINT, MULTIPLE SITES: ICD-10-CM

## 2017-05-29 DIAGNOSIS — K59.00 CONSTIPATION, UNSPECIFIED CONSTIPATION TYPE: ICD-10-CM

## 2017-05-29 PROCEDURE — G8432 DEP SCR NOT DOC, RNG: HCPCS | Performed by: PHYSICIAN ASSISTANT

## 2017-05-29 PROCEDURE — 77077 JOINT SURVEY SINGLE VIEW: CPT | Mod: TC | Performed by: PHYSICIAN ASSISTANT

## 2017-05-29 PROCEDURE — 4040F PNEUMOC VAC/ADMIN/RCVD: CPT | Mod: 8P | Performed by: PHYSICIAN ASSISTANT

## 2017-05-29 PROCEDURE — 99214 OFFICE O/P EST MOD 30 MIN: CPT | Performed by: PHYSICIAN ASSISTANT

## 2017-05-29 PROCEDURE — 1036F TOBACCO NON-USER: CPT | Performed by: PHYSICIAN ASSISTANT

## 2017-05-29 PROCEDURE — 72170 X-RAY EXAM OF PELVIS: CPT | Mod: TC | Performed by: PHYSICIAN ASSISTANT

## 2017-05-29 PROCEDURE — G8419 CALC BMI OUT NRM PARAM NOF/U: HCPCS | Performed by: PHYSICIAN ASSISTANT

## 2017-05-29 PROCEDURE — 73030 X-RAY EXAM OF SHOULDER: CPT | Mod: TC,RT | Performed by: PHYSICIAN ASSISTANT

## 2017-05-29 PROCEDURE — 1101F PT FALLS ASSESS-DOCD LE1/YR: CPT | Performed by: PHYSICIAN ASSISTANT

## 2017-05-29 RX ORDER — OXYCODONE HYDROCHLORIDE AND ACETAMINOPHEN 5; 325 MG/1; MG/1
1 TABLET ORAL EVERY 6 HOURS PRN
Qty: 20 TAB | Refills: 0 | Status: SHIPPED | OUTPATIENT
Start: 2017-05-29 | End: 2017-06-06 | Stop reason: SDUPTHER

## 2017-05-29 ASSESSMENT — PAIN SCALES - GENERAL: PAINLEVEL: 10=SEVERE PAIN

## 2017-05-29 NOTE — MR AVS SNAPSHOT
JITENDRA Talley   2017 12:25 PM   Office Visit   MRN: [id number]    Department:  [facility]   Dept Phone:  [phone]    Description:  Female : 1934   Provider:  Neda Benavides PA-C           Reason for Visit     Shoulder Pain Pain in shoulders, hands, and hips      Allergies as of 2017     No Known Allergies      You were diagnosed with     Pain, joint, multiple sites   [578639]         Vital Signs     Blood Pressure Pulse Temperature Respirations Weight Oxygen Saturation    130/80 mmHg 86 36.9 °C (98.4 °F) 16 68.493 kg (151 lb) 97%    Smoking Status                   Former Smoker           Basic Information     Date Of Birth Sex Race Ethnicity Preferred Language    1934 Female White Non- English      Your appointments     2017  2:20 PM   Established Patient with Bea Elizabeth M.D.   [facility] ([facility]    [address] [place] [id number] [phone]           You will be receiving a confirmation call a few days before your appointment from our automated call confirmation system.            Jul 10, 2017  2:20 PM   Established Patient with Bea Elizabeth M.D.   [facility]    [address]  [id number] [phone]           You will be receiving a confirmation call a few days before your appointment from our automated call confirmation system.              Problem List              ICD-10-CM Priority Class Noted - Resolved    Glaucoma H40.9   2015 - Present    Hypertension I10   2015 - Present    Hypothyroidism E03.9   2015 - Present    HSV (herpes simplex virus) infection B00.9   2015 - Present    Osteoarthritis M19.90   2015 - Present    Fall W19.XXXA   3/9/2015 - Present    Hyperlipidemia E78.5   2015 - Present    Renal insufficiency N28.9   2015 - Present    CKD (chronic kidney disease) N18.9   2016 - Present    Vitamin D  deficiency disease E55.9   2/18/2016 - Present    Post-nasal drip R09.82   2/18/2016 - Present    Fatigue due to depression F32.9, R53.83   3/19/2016 - Present    CLL (chronic lymphocytic leukemia) (CMS-HCC) C91.10   3/22/2016 - Present    Left knee pain M25.562   3/29/2016 - Present    Chronic fatigue R53.82   3/29/2016 - Present    Chronic back pain M54.9, G89.29   8/18/2016 - Present      Health Maintenance        Date Due Completion Dates    IMM DTaP/Tdap/Td Vaccine (1 - Tdap) 11/19/1953 ---    IMM ZOSTER VACCINE 11/19/1994 ---    BONE DENSITY 11/19/1999 ---    IMM PNEUMOCOCCAL 65+ (ADULT) HIGH/HIGHEST RISK SERIES (1 of 2 - PCV13) 11/19/1999 ---            Current Immunizations     No immunizations on file.      Below and/or attached are the medications your provider expects you to take. Review all of your home medications and newly ordered medications with your provider and/or pharmacist. Follow medication instructions as directed by your provider and/or pharmacist. Please keep your medication list with you and share with your provider. Update the information when medications are discontinued, doses are changed, or new medications (including over-the-counter products) are added; and carry medication information at all times in the event of emergency situations     Allergies:  No Known Allergies          Medications  Valid as of: May 29, 2017 -  2:10 PM    Generic Name Brand Name Tablet Size Instructions for use    Acetaminophen (Tab) TYLENOL 500 MG Take 1-2 Tabs by mouth 3 times a day as needed for Moderate Pain.        Acyclovir (Ointment) ZOVIRAX 5 % Apply 1 Application to affected area(s) every 3 hours.        Cholecalciferol (Tab) cholecalciferol 1000 UNIT Take 4,000 Units by mouth every day.        Cyanocobalamin   Take  by mouth.        CycloSPORINE (Emulsion) RESTASIS 0.05 % Place 1 Drop in both eyes 2 times a day.        Diclofenac Sodium (Gel) Diclofenac Sodium 1 % Apply to 2-4 gram to affected area 4  times daily        Ergocalciferol (Cap) DRISDOL 06635 UNIT Take 1 Cap by mouth every 7 days.        Estrogens, Conjugated (Cream) PREMARIN 0.625 MG/GM         Hydrocodone-Acetaminophen (Tab) NORCO 5-325 MG Take 1 Tab by mouth 2 times a day as needed (severe pain).        Levothyroxine Sodium (Tab) SYNTHROID 50 MCG Take 1 Tab by mouth every day.        Lisinopril (Tab) PRINIVIL 20 MG Take 1 Tab by mouth every day.        Lysine HCl (Tab) lysine 500 MG Take 500 mg by mouth every day.        Oxycodone-Acetaminophen (Tab) PERCOCET 5-325 MG Take 1 Tab by mouth every 6 hours as needed for Moderate Pain.        Timolol Maleate (Solution) TIMOPTIC 0.5 %         Travoprost (Solution) TRAVATAN Z 0.004 %         .                 Medicines prescribed today were sent to:     [facility] [address]    Phone: [phone] Fax: [phone]    Open 24 Hours?: No      Medication refill instructions:       If your prescription bottle indicates you have medication refills left, it is not necessary to call your provider’s office. Please contact your pharmacy and they will refill your medication.    If your prescription bottle indicates you do not have any refills left, you may request refills at any time through one of the following ways: The online [employer] system (except Urgent Care), by calling your provider’s office, or by asking your pharmacy to contact your provider’s office with a refill request. Medication refills are processed only during regular business hours and may not be available until the next business day. Your provider may request additional information or to have a follow-up visit with you prior to refilling your medication.   *Please Note: Medication refills are assigned a new Rx number when refilled electronically. Your pharmacy may indicate that no refills were authorized even though a new prescription for the same medication is available at the pharmacy.  Please request the medicine by name with the pharmacy before contacting your provider for a refill.        Your To Do List     Future Labs/Procedures Complete By Expires    DX-JOINT SURVEY-HANDS SINGLE VIEW  As directed 5/29/2018    DX-PELVIS-1 OR 2 VIEWS  As directed 5/29/2018    DX-SHOULDER 2+ LEFT  As directed 5/29/2018    DX-SHOULDER 2+ RIGHT  As directed 5/29/2018         MyChart Status: Patient Declined

## 2017-06-06 ENCOUNTER — OFFICE VISIT (OUTPATIENT)
Dept: MEDICAL GROUP | Facility: PHYSICIAN GROUP | Age: 82
End: 2017-06-06
Payer: MEDICARE

## 2017-06-06 VITALS
TEMPERATURE: 97.7 F | HEIGHT: 62 IN | RESPIRATION RATE: 12 BRPM | DIASTOLIC BLOOD PRESSURE: 84 MMHG | OXYGEN SATURATION: 97 % | WEIGHT: 146 LBS | SYSTOLIC BLOOD PRESSURE: 128 MMHG | HEART RATE: 80 BPM | BODY MASS INDEX: 26.87 KG/M2

## 2017-06-06 DIAGNOSIS — N28.9 RENAL INSUFFICIENCY: ICD-10-CM

## 2017-06-06 DIAGNOSIS — M25.50 PAIN, JOINT, MULTIPLE SITES: ICD-10-CM

## 2017-06-06 DIAGNOSIS — E78.5 HYPERLIPIDEMIA, UNSPECIFIED HYPERLIPIDEMIA TYPE: ICD-10-CM

## 2017-06-06 DIAGNOSIS — C91.10 CLL (CHRONIC LYMPHOCYTIC LEUKEMIA) (HCC): ICD-10-CM

## 2017-06-06 DIAGNOSIS — M15.9 PRIMARY OSTEOARTHRITIS INVOLVING MULTIPLE JOINTS: ICD-10-CM

## 2017-06-06 PROCEDURE — 4040F PNEUMOC VAC/ADMIN/RCVD: CPT | Mod: 8P | Performed by: FAMILY MEDICINE

## 2017-06-06 PROCEDURE — G8432 DEP SCR NOT DOC, RNG: HCPCS | Performed by: FAMILY MEDICINE

## 2017-06-06 PROCEDURE — 99214 OFFICE O/P EST MOD 30 MIN: CPT | Performed by: FAMILY MEDICINE

## 2017-06-06 PROCEDURE — 1036F TOBACCO NON-USER: CPT | Performed by: FAMILY MEDICINE

## 2017-06-06 PROCEDURE — G8419 CALC BMI OUT NRM PARAM NOF/U: HCPCS | Performed by: FAMILY MEDICINE

## 2017-06-06 PROCEDURE — 1101F PT FALLS ASSESS-DOCD LE1/YR: CPT | Performed by: FAMILY MEDICINE

## 2017-06-06 RX ORDER — OXYCODONE HYDROCHLORIDE AND ACETAMINOPHEN 5; 325 MG/1; MG/1
1 TABLET ORAL EVERY 6 HOURS PRN
Qty: 20 TAB | Refills: 0 | Status: SHIPPED | OUTPATIENT
Start: 2017-06-06 | End: 2017-07-14 | Stop reason: SDUPTHER

## 2017-06-06 RX ORDER — ACYCLOVIR 50 MG/G
1 OINTMENT TOPICAL
Qty: 1 TUBE | Refills: 6 | Status: SHIPPED | OUTPATIENT
Start: 2017-06-06 | End: 2017-07-07 | Stop reason: SDUPTHER

## 2017-06-06 RX ORDER — DICLOFENAC SODIUM 75 MG/1
75 TABLET, DELAYED RELEASE ORAL 2 TIMES DAILY
Qty: 60 TAB | Refills: 3 | Status: SHIPPED | OUTPATIENT
Start: 2017-06-06 | End: 2017-06-28

## 2017-06-06 NOTE — ASSESSMENT & PLAN NOTE
Pain worsening over last few months in b/l hands, shoulders, hips.   Took a steroid course a month ago. Flying out to [place] tomorrow coming back Sunday. Requesting prednisone for the trip.   Was on oxycodone-acetaminophen 5/325 mg every 6 hours along with two tylenol 500mg.   She is not on any anti inflammatory medication.

## 2017-06-06 NOTE — MR AVS SNAPSHOT
"        JITENDRA Seed   2017 2:20 PM   Office Visit   MRN: [id number]    Department:  [facility]   Dept Phone:  [phone]    Description:  Female : 1934   Provider:  Bea Elizabeth M.D.           Reason for Visit     Shoulder Pain hands , shoulders, hips       Allergies as of 2017     No Known Allergies      You were diagnosed with     Primary osteoarthritis involving multiple joints   [9545642]       CLL (chronic lymphocytic leukemia) (CMS-[facility])   [963923]       Hyperlipidemia, unspecified hyperlipidemia type   [6465730]       Renal insufficiency   [391395]       Pain, joint, multiple sites   [136839]         Vital Signs     Blood Pressure Pulse Temperature Respirations Height Weight    128/84 mmHg 80 36.5 °C (97.7 °F) 12 1.575 m (5' 2\") 66.225 kg (146 lb)    Body Mass Index Oxygen Saturation Smoking Status             26.70 kg/m2 97% Former Smoker         Basic Information     Date Of Birth Sex Race Ethnicity Preferred Language    1934 Female White Non- English      Your appointments     Jul 10, 2017  2:20 PM   Established Patient with Bea Elizabeth M.D.   [facility])    [address] [place] [address] [phone]           You will be receiving a confirmation call a few days before your appointment from our automated call confirmation system.              Problem List              ICD-10-CM Priority Class Noted - Resolved    Glaucoma H40.9   2015 - Present    Hypertension I10   2015 - Present    Hypothyroidism E03.9   2015 - Present    HSV (herpes simplex virus) infection B00.9   2015 - Present    Osteoarthritis M19.90   2015 - Present    Fall W19.XXXA   3/9/2015 - Present    Hyperlipidemia E78.5   2015 - Present    Renal insufficiency N28.9   2015 - Present    CKD (chronic kidney disease) N18.9   2016 - Present    Vitamin D deficiency disease E55.9   2016 - Present    Post-nasal " drip R09.82   2/18/2016 - Present    Fatigue due to depression F32.9, R53.83   3/19/2016 - Present    CLL (chronic lymphocytic leukemia) (CMS-[facility]) C91.10   3/22/2016 - Present    Left knee pain M25.562   3/29/2016 - Present    Chronic fatigue R53.82   3/29/2016 - Present    Chronic back pain M54.9, G89.29   8/18/2016 - Present      Health Maintenance        Date Due Completion Dates    IMM DTaP/Tdap/Td Vaccine (1 - Tdap) 11/19/1953 ---    IMM ZOSTER VACCINE 11/19/1994 ---    BONE DENSITY 11/19/1999 ---    IMM PNEUMOCOCCAL 65+ (ADULT) HIGH/HIGHEST RISK SERIES (1 of 2 - PCV13) 11/19/1999 ---            Current Immunizations     No immunizations on file.      Below and/or attached are the medications your provider expects you to take. Review all of your home medications and newly ordered medications with your provider and/or pharmacist. Follow medication instructions as directed by your provider and/or pharmacist. Please keep your medication list with you and share with your provider. Update the information when medications are discontinued, doses are changed, or new medications (including over-the-counter products) are added; and carry medication information at all times in the event of emergency situations     Allergies:  No Known Allergies          Medications  Valid as of: June 16, 2017 -  6:14 PM    Generic Name Brand Name Tablet Size Instructions for use    Acetaminophen (Tab) TYLENOL 500 MG Take 1-2 Tabs by mouth 3 times a day as needed for Moderate Pain.        Acyclovir (Ointment) ZOVIRAX 5 % Apply 1 Application to affected area(s) every 3 hours.        Cholecalciferol (Tab) cholecalciferol 1000 UNIT Take 4,000 Units by mouth every day.        Cyanocobalamin   Take  by mouth.        CycloSPORINE (Emulsion) RESTASIS 0.05 % Place 1 Drop in both eyes 2 times a day.        Diclofenac Sodium (Gel) Diclofenac Sodium 1 % Apply to 2-4 gram to affected area 4 times daily        Diclofenac Sodium (Tablet Delayed  Response) VOLTAREN 75 MG Take 1 Tab by mouth 2 times a day.        Ergocalciferol (Cap) DRISDOL 58335 UNIT Take 1 Cap by mouth every 7 days.        Estrogens, Conjugated (Cream) PREMARIN 0.625 MG/GM         Hydrocodone-Acetaminophen (Tab) NORCO 5-325 MG Take 1 Tab by mouth 2 times a day as needed (severe pain).        Levothyroxine Sodium (Tab) SYNTHROID 50 MCG Take 1 Tab by mouth every day.        Lisinopril (Tab) PRINIVIL 20 MG Take 1 Tab by mouth every day.        Lysine HCl (Tab) lysine 500 MG Take 500 mg by mouth every day.        Oxycodone-Acetaminophen (Tab) PERCOCET 5-325 MG Take 1 Tab by mouth every 6 hours as needed for Moderate Pain.        Timolol Maleate (Solution) TIMOPTIC 0.5 %         Travoprost (Solution) TRAVATAN Z 0.004 %         .                 Medicines prescribed today were sent to:     [facility] - [address]    [address] [place] [address]    Phone: [phone] Fax: [phone]    Open 24 Hours?: No    [id number] - [facility] [address] [id number]  AT [facility] [address] [address]    Phone: [phone] Fax: [phone]    Open 24 Hours?: No      Medication refill instructions:       If your prescription bottle indicates you have medication refills left, it is not necessary to call your provider’s office. Please contact your pharmacy and they will refill your medication.    If your prescription bottle indicates you do not have any refills left, you may request refills at any time through one of the following ways: The online [employer] system (except Urgent Care), by calling your provider’s office, or by asking your pharmacy to contact your provider’s office with a refill request. Medication refills are processed only during regular business hours and may not be available until the next business day. Your provider may request additional information or to have a follow-up visit with you prior to refilling  your medication.   *Please Note: Medication refills are assigned a new Rx number when refilled electronically. Your pharmacy may indicate that no refills were authorized even though a new prescription for the same medication is available at the pharmacy. Please request the medicine by name with the pharmacy before contacting your provider for a refill.        Referral     A referral request has been sent to our patient care coordination department. Please allow 3-5 business days for us to process this request and contact you either by phone or mail. If you do not hear from us by the 5th business day, please call us at [phone].           MyChart Status: Patient Declined

## 2017-06-06 NOTE — ASSESSMENT & PLAN NOTE
Labs due  Patient is a vegetarian. Uses butter made with olive oil and flax seed.   Not on a cholesterol medication.

## 2017-06-08 NOTE — PROGRESS NOTES
Subjective:   JITENDRA Talley is a 82 y.o. female here today for worsening osteoarthritis pain, CLL, dyslipidemia    Osteoarthritis  Pain worsening over last few months in b/l hands, shoulders, hips.   Took a steroid course a month ago. Flying out to [place] tomorrow coming back Sunday. Requesting prednisone for the trip.   Was on oxycodone-acetaminophen 5/325 mg every 6 hours along with two tylenol 500mg.   She is not on any anti inflammatory medication.       CLL (chronic lymphocytic leukemia) (CMS-HCC)  Followed by Dr. Miller with visits every 6 months.    Hyperlipidemia  Labs due  Patient is a vegetarian. Uses butter made with olive oil and flax seed.   Not on a cholesterol medication.     Renal insufficiency  GFR improved to 53.   Advised on good hydration.         During the course of her visit. Patient talked about how prednisone had helped her with her pain and how oxycodone had helped with her pain reviewed using NSAIDs sparingly. After the visit was finished, medical student told me that the patient actually wanted me to prescribe her prednisone and opioid pain medication. I return to the patient, she was very agitated and stated that she is going on a trip during which time she needed some prednisone as this had been the only thing that helped with her pain. I told her that she had discussed dose of short-term prednisone from the urgent care and this was not safe medication for repeated uses as it can cause sepsis and DVTs. I did provide her with a short course of Percocet. Advised her against continued use of increasing doses of opioids for chronic pain medication and is not indicated for chronic pain     The patient will benefit from getting rheumatoid labs done in the future.    Current medicines (including changes today)  Current Outpatient Prescriptions   Medication Sig Dispense Refill   • acyclovir (ZOVIRAX) 5 % Ointment Apply 1 Application to affected area(s) every 3 hours. 1 Tube 6   • diclofenac EC  "(VOLTAREN) 75 MG Tablet Delayed Response Take 1 Tab by mouth 2 times a day. 60 Tab 3   • oxycodone-acetaminophen (PERCOCET) 5-325 MG Tab Take 1 Tab by mouth every 6 hours as needed for Moderate Pain. 20 Tab 0   • timolol (TIMOPTIC) 0.5 % Solution      • hydrocodone-acetaminophen (NORCO) 5-325 MG Tab per tablet Take 1 Tab by mouth 2 times a day as needed (severe pain). 60 Tab 0   • acetaminophen (TYLENOL) 500 MG Tab Take 1-2 Tabs by mouth 3 times a day as needed for Moderate Pain. 90 Tab 1   • Diclofenac Sodium 1 % Gel Apply to 2-4 gram to affected area 4 times daily 100 g 2   • levothyroxine (SYNTHROID) 50 MCG Tab Take 1 Tab by mouth every day. 90 Tab 3   • lisinopril (PRINIVIL) 20 MG Tab Take 1 Tab by mouth every day. 90 Tab 3   • cyclosporin (RESTASIS) 0.05 % ophthalmic emulsion Place 1 Drop in both eyes 2 times a day.     • ergocalciferol (DRISDOL) 81723 UNIT capsule Take 1 Cap by mouth every 7 days. 12 Cap 0   • Cyanocobalamin (VITAMIN B 12 PO) Take  by mouth.     • vitamin D (CHOLECALCIFEROL) 1000 UNIT Tab Take 4,000 Units by mouth every day.     • lysine 500 MG TABS Take 500 mg by mouth every day.     • PREMARIN 0.625 MG/GM CREA      • TRAVATAN Z 0.004 % SOLN        No current facility-administered medications for this visit.     She  has a past medical history of H/O scarlet fever; Hypertension; Hyperlipidemia; HSV-1 infection; HSV-2 infection; and CLL (chronic lymphocytic leukemia) (CMS-[facility]) (3/22/2016). She also has no past medical history of Seizure (CMS-HCC), Heart murmur, Heart attack (CMS-[facility]), Stroke (CMS-[facility]), Type II or unspecified type diabetes mellitus without mention of complication, not stated as uncontrolled, Clotting disorder (CMS-HCC), Asthma, or Arrhythmia.    ROS  No chest pain, no shortness of breath, no abdominal pain       Objective:     Blood pressure 128/84, pulse 80, temperature 36.5 °C (97.7 °F), resp. rate 12, height 1.575 m (5' 2\"), weight 66.225 kg (146 lb), SpO2 97 %. Body mass " index is 26.7 kg/(m^2).   Physical Exam:  Constitutional: Alert, no distress.  Skin: Warm, dry, good turgor, no rashes in visible areas.  Eye: Equal, round and reactive, conjunctiva clear, lids normal.  ENMT: Lips without lesions, good dentition, oropharynx clear.  Neck: Trachea midline, no masses, no thyromegaly. No cervical or supraclavicular lymphadenopathy  Respiratory: Unlabored respiratory effort, lungs clear to auscultation, no wheezes, no ronchi.  Cardiovascular: Normal S1, S2, no murmur, no edema.  Abdomen: Soft, non-tender, no masses, no hepatosplenomegaly.  Psych: Alert and oriented x3, normal affect and mood.        Assessment and Plan:   The following treatment plan was discussed    1. Primary osteoarthritis involving multiple joints  Follow-up with physical therapy DME for walker, which patient requested with larger wheels and a seat. Prescription given for diclofenac. Patient advised on possible GI irritation.  - REFERRAL TO PHYSICAL THERAPY Reason for Therapy: Eval/Treat/Report  - DME WALKER  - diclofenac EC (VOLTAREN) 75 MG Tablet Delayed Response; Take 1 Tab by mouth 2 times a day.  Dispense: 60 Tab; Refill: 3    2. CLL (chronic lymphocytic leukemia) (CMS-HCC)  Stable. Follow-up with oncologist.    3. Hyperlipidemia, unspecified hyperlipidemia type  Repeat labs. Follow good diet low in saturated fats with plenty of fiber and fruits and vegetables    4. Renal insufficiency  Improved. Continue good hydration.    5. Pain, joint, multiple sites  Refill on pain medication.  - oxycodone-acetaminophen (PERCOCET) 5-325 MG Tab; Take 1 Tab by mouth every 6 hours as needed for Moderate Pain.  Dispense: 20 Tab; Refill: 0      Followup: Return in about 3 months (around 9/6/2017).

## 2017-06-16 ENCOUNTER — OFFICE VISIT (OUTPATIENT)
Dept: URGENT CARE | Facility: PHYSICIAN GROUP | Age: 82
End: 2017-06-16
Payer: MEDICARE

## 2017-06-16 VITALS
DIASTOLIC BLOOD PRESSURE: 74 MMHG | RESPIRATION RATE: 20 BRPM | WEIGHT: 146 LBS | SYSTOLIC BLOOD PRESSURE: 140 MMHG | TEMPERATURE: 98.3 F | HEART RATE: 83 BPM | BODY MASS INDEX: 26.7 KG/M2 | OXYGEN SATURATION: 93 %

## 2017-06-16 DIAGNOSIS — R63.0 NO APPETITE: ICD-10-CM

## 2017-06-16 DIAGNOSIS — R68.83 CHILLS: ICD-10-CM

## 2017-06-16 DIAGNOSIS — R25.1 SHAKY: ICD-10-CM

## 2017-06-16 DIAGNOSIS — N95.1 SWEATS, MENOPAUSAL: ICD-10-CM

## 2017-06-16 LAB
APPEARANCE UR: CLEAR
BILIRUB UR STRIP-MCNC: NORMAL MG/DL
COLOR UR AUTO: YELLOW
GLUCOSE BLD-MCNC: 86 MG/DL (ref 70–100)
GLUCOSE UR STRIP.AUTO-MCNC: NORMAL MG/DL
KETONES UR STRIP.AUTO-MCNC: 5 MG/DL
LEUKOCYTE ESTERASE UR QL STRIP.AUTO: NORMAL
NITRITE UR QL STRIP.AUTO: NORMAL
PH UR STRIP.AUTO: 5 [PH] (ref 5–8)
PROT UR QL STRIP: NORMAL MG/DL
RBC UR QL AUTO: NORMAL
SP GR UR STRIP.AUTO: 1
UROBILINOGEN UR STRIP-MCNC: NORMAL MG/DL

## 2017-06-16 PROCEDURE — 81002 URINALYSIS NONAUTO W/O SCOPE: CPT | Performed by: PHYSICIAN ASSISTANT

## 2017-06-16 PROCEDURE — 82962 GLUCOSE BLOOD TEST: CPT | Performed by: PHYSICIAN ASSISTANT

## 2017-06-16 PROCEDURE — 99214 OFFICE O/P EST MOD 30 MIN: CPT | Performed by: PHYSICIAN ASSISTANT

## 2017-06-16 RX ORDER — SULFAMETHOXAZOLE AND TRIMETHOPRIM 800; 160 MG/1; MG/1
1 TABLET ORAL EVERY 12 HOURS
Qty: 6 TAB | Refills: 0 | Status: SHIPPED | OUTPATIENT
Start: 2017-06-16 | End: 2017-06-19

## 2017-06-16 NOTE — MR AVS SNAPSHOT
JITENDRA Talley   2017 5:30 PM   Office Visit   MRN: [id number]    Department:  [facility]   Dept Phone:  [phone]    Description:  Female : 1934   Provider:  Neda Benavides PA-C           Reason for Visit     Chills numbness in L hand      Allergies as of 2017     No Known Allergies      You were diagnosed with     Chills   [2013]       Sweats, menopausal   [931129]       Shaky   [776628]       No appetite   [120192]         Vital Signs     Blood Pressure Pulse Temperature Respirations Weight Oxygen Saturation    140/74 mmHg 83 36.8 °C (98.3 °F) 20 66.225 kg (146 lb) 93%    Smoking Status                   Former Smoker           Basic Information     Date Of Birth Sex Race Ethnicity Preferred Language    1934 Female White Non- English      Your appointments     Jul 10, 2017  2:20 PM   Established Patient with Bea Elizabeth M.D.   [address] [address] [phone]           You will be receiving a confirmation call a few days before your appointment from our automated call confirmation system.              Problem List              ICD-10-CM Priority Class Noted - Resolved    Glaucoma H40.9   2015 - Present    Hypertension I10   2015 - Present    Hypothyroidism E03.9   2015 - Present    HSV (herpes simplex virus) infection B00.9   2015 - Present    Osteoarthritis M19.90   2015 - Present    Fall W19.XXXA   3/9/2015 - Present    Hyperlipidemia E78.5   2015 - Present    Renal insufficiency N28.9   2015 - Present    CKD (chronic kidney disease) N18.9   2016 - Present    Vitamin D deficiency disease E55.9   2016 - Present    Post-nasal drip R09.82   2016 - Present    Fatigue due to depression F32.9, R53.83   3/19/2016 - Present    CLL (chronic lymphocytic leukemia) (CMS-HCC) C91.10   3/22/2016 - Present    Left knee pain M25.562   3/29/2016 -  Present    Chronic fatigue R53.82   3/29/2016 - Present    Chronic back pain M54.9, G89.29   8/18/2016 - Present      Health Maintenance        Date Due Completion Dates    IMM DTaP/Tdap/Td Vaccine (1 - Tdap) 11/19/1953 ---    IMM ZOSTER VACCINE 11/19/1994 ---    BONE DENSITY 11/19/1999 ---    IMM PNEUMOCOCCAL 65+ (ADULT) HIGH/HIGHEST RISK SERIES (1 of 2 - PCV13) 11/19/1999 ---            Current Immunizations     No immunizations on file.      Below and/or attached are the medications your provider expects you to take. Review all of your home medications and newly ordered medications with your provider and/or pharmacist. Follow medication instructions as directed by your provider and/or pharmacist. Please keep your medication list with you and share with your provider. Update the information when medications are discontinued, doses are changed, or new medications (including over-the-counter products) are added; and carry medication information at all times in the event of emergency situations     Allergies:  No Known Allergies          Medications  Valid as of: June 16, 2017 -  6:14 PM    Generic Name Brand Name Tablet Size Instructions for use    Acetaminophen (Tab) TYLENOL 500 MG Take 1-2 Tabs by mouth 3 times a day as needed for Moderate Pain.        Acyclovir (Ointment) ZOVIRAX 5 % Apply 1 Application to affected area(s) every 3 hours.        Cholecalciferol (Tab) cholecalciferol 1000 UNIT Take 4,000 Units by mouth every day.        Cyanocobalamin   Take  by mouth.        CycloSPORINE (Emulsion) RESTASIS 0.05 % Place 1 Drop in both eyes 2 times a day.        Diclofenac Sodium (Gel) Diclofenac Sodium 1 % Apply to 2-4 gram to affected area 4 times daily        Diclofenac Sodium (Tablet Delayed Response) VOLTAREN 75 MG Take 1 Tab by mouth 2 times a day.        Ergocalciferol (Cap) DRISDOL 09361 UNIT Take 1 Cap by mouth every 7 days.        Estrogens, Conjugated (Cream) PREMARIN 0.625 MG/GM          Hydrocodone-Acetaminophen (Tab) NORCO 5-325 MG Take 1 Tab by mouth 2 times a day as needed (severe pain).        Levothyroxine Sodium (Tab) SYNTHROID 50 MCG Take 1 Tab by mouth every day.        Lisinopril (Tab) PRINIVIL 20 MG Take 1 Tab by mouth every day.        Lysine HCl (Tab) lysine 500 MG Take 500 mg by mouth every day.        Oxycodone-Acetaminophen (Tab) PERCOCET 5-325 MG Take 1 Tab by mouth every 6 hours as needed for Moderate Pain.        Sulfamethoxazole-Trimethoprim (Tab) BACTRIM -160 MG Take 1 Tab by mouth every 12 hours for 3 days.        Timolol Maleate (Solution) TIMOPTIC 0.5 %         Travoprost (Solution) TRAVATAN Z 0.004 %         .                 Medicines prescribed today were sent to:     [facility] - [place], [place] - [address]    [address] [address]    Phone: [phone] Fax: [phone]    Open 24 Hours?: No    [id number] - [place], [place] - [address] [place] [address] & [place]    [address] [facility] [address]    Phone: [phone] Fax: [phone]    Open 24 Hours?: No      Medication refill instructions:       If your prescription bottle indicates you have medication refills left, it is not necessary to call your provider’s office. Please contact your pharmacy and they will refill your medication.    If your prescription bottle indicates you do not have any refills left, you may request refills at any time through one of the following ways: The online [facility] system (except Urgent Care), by calling your provider’s office, or by asking your pharmacy to contact your provider’s office with a refill request. Medication refills are processed only during regular business hours and may not be available until the next business day. Your provider may request additional information or to have a follow-up visit with you prior to refilling your medication.   *Please Note: Medication refills are assigned a new Rx number when refilled  electronically. Your pharmacy may indicate that no refills were authorized even though a new prescription for the same medication is available at the pharmacy. Please request the medicine by name with the pharmacy before contacting your provider for a refill.           MyChart Status: Patient Declined
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
## 2017-06-20 RX ORDER — LISINOPRIL 20 MG/1
TABLET ORAL
Qty: 90 TAB | Refills: 0 | Status: SHIPPED | OUTPATIENT
Start: 2017-06-20 | End: 2017-06-22

## 2017-06-20 NOTE — TELEPHONE ENCOUNTER
Was the patient seen in the last year in this department? Yes     Does patient have an active prescription for medications requested? No     Received Request Via: Pharmacy      Pt met protocol?: Yes, LABS 2/17, OV PCP 6/6/17, OV OP URGENT CARE 6/16/17 /74

## 2017-06-21 ENCOUNTER — HOSPITAL ENCOUNTER (OUTPATIENT)
Dept: LAB | Facility: MEDICAL CENTER | Age: 82
End: 2017-06-21
Attending: PHYSICIAN ASSISTANT
Payer: MEDICARE

## 2017-06-21 ENCOUNTER — TELEPHONE (OUTPATIENT)
Dept: URGENT CARE | Facility: PHYSICIAN GROUP | Age: 82
End: 2017-06-21

## 2017-06-21 ENCOUNTER — OFFICE VISIT (OUTPATIENT)
Dept: URGENT CARE | Facility: PHYSICIAN GROUP | Age: 82
End: 2017-06-21
Payer: MEDICARE

## 2017-06-21 ENCOUNTER — HOSPITAL ENCOUNTER (OUTPATIENT)
Dept: LAB | Facility: MEDICAL CENTER | Age: 82
End: 2017-06-21
Attending: INTERNAL MEDICINE
Payer: MEDICARE

## 2017-06-21 VITALS
BODY MASS INDEX: 26.33 KG/M2 | SYSTOLIC BLOOD PRESSURE: 140 MMHG | DIASTOLIC BLOOD PRESSURE: 70 MMHG | RESPIRATION RATE: 16 BRPM | TEMPERATURE: 98.7 F | WEIGHT: 144 LBS | OXYGEN SATURATION: 97 % | HEART RATE: 78 BPM

## 2017-06-21 DIAGNOSIS — R63.0 DECREASED APPETITE: ICD-10-CM

## 2017-06-21 DIAGNOSIS — R53.83 FATIGUE, UNSPECIFIED TYPE: ICD-10-CM

## 2017-06-21 DIAGNOSIS — E55.9 VITAMIN D DEFICIENCY DISEASE: ICD-10-CM

## 2017-06-21 DIAGNOSIS — E03.9 HYPOTHYROIDISM: ICD-10-CM

## 2017-06-21 DIAGNOSIS — N39.0 URINARY TRACT INFECTION WITHOUT HEMATURIA, SITE UNSPECIFIED: ICD-10-CM

## 2017-06-21 LAB
25(OH)D3 SERPL-MCNC: 36 NG/ML (ref 30–100)
ALBUMIN SERPL BCP-MCNC: 3.7 G/DL (ref 3.2–4.9)
ALBUMIN/GLOB SERPL: 0.9 G/DL
ALP SERPL-CCNC: 53 U/L (ref 30–99)
ALT SERPL-CCNC: 9 U/L (ref 2–50)
ANION GAP SERPL CALC-SCNC: 7 MMOL/L (ref 0–11.9)
ANISOCYTOSIS BLD QL SMEAR: ABNORMAL
AST SERPL-CCNC: 15 U/L (ref 12–45)
BASOPHILS # BLD AUTO: 0 % (ref 0–1.8)
BASOPHILS # BLD: 0 K/UL (ref 0–0.12)
BILIRUB SERPL-MCNC: 0.5 MG/DL (ref 0.1–1.5)
BUN SERPL-MCNC: 31 MG/DL (ref 8–22)
CALCIUM SERPL-MCNC: 9.6 MG/DL (ref 8.5–10.5)
CHLORIDE SERPL-SCNC: 98 MMOL/L (ref 96–112)
CO2 SERPL-SCNC: 23 MMOL/L (ref 20–33)
CREAT SERPL-MCNC: 1.59 MG/DL (ref 0.5–1.4)
EOSINOPHIL # BLD AUTO: 0.28 K/UL (ref 0–0.51)
EOSINOPHIL NFR BLD: 0.9 % (ref 0–6.9)
ERYTHROCYTE [DISTWIDTH] IN BLOOD BY AUTOMATED COUNT: 46 FL (ref 35.9–50)
GFR SERPL CREATININE-BSD FRML MDRD: 31 ML/MIN/1.73 M 2
GLOBULIN SER CALC-MCNC: 3.9 G/DL (ref 1.9–3.5)
GLUCOSE SERPL-MCNC: 89 MG/DL (ref 65–99)
HCT VFR BLD AUTO: 43.9 % (ref 37–47)
HGB BLD-MCNC: 14 G/DL (ref 12–16)
LYMPHOCYTES # BLD AUTO: 21.44 K/UL (ref 1–4.8)
LYMPHOCYTES NFR BLD: 68.5 % (ref 22–41)
MANUAL DIFF BLD: NORMAL
MCH RBC QN AUTO: 29.8 PG (ref 27–33)
MCHC RBC AUTO-ENTMCNC: 31.9 G/DL (ref 33.6–35)
MCV RBC AUTO: 93.4 FL (ref 81.4–97.8)
MONOCYTES # BLD AUTO: 0.59 K/UL (ref 0–0.85)
MONOCYTES NFR BLD AUTO: 1.9 % (ref 0–13.4)
MORPHOLOGY BLD-IMP: NORMAL
NEUTROPHILS # BLD AUTO: 8.98 K/UL (ref 2–7.15)
NEUTROPHILS NFR BLD: 27.8 % (ref 44–72)
NEUTS BAND NFR BLD MANUAL: 0.9 % (ref 0–10)
NRBC # BLD AUTO: 0 K/UL
NRBC BLD AUTO-RTO: 0 /100 WBC
PLATELET # BLD AUTO: 352 K/UL (ref 164–446)
PLATELET BLD QL SMEAR: NORMAL
PMV BLD AUTO: 12 FL (ref 9–12.9)
POTASSIUM SERPL-SCNC: 5.6 MMOL/L (ref 3.6–5.5)
PROT SERPL-MCNC: 7.6 G/DL (ref 6–8.2)
RBC # BLD AUTO: 4.7 M/UL (ref 4.2–5.4)
RBC BLD AUTO: PRESENT
SMUDGE CELLS BLD QL SMEAR: NORMAL
SODIUM SERPL-SCNC: 128 MMOL/L (ref 135–145)
T4 FREE SERPL-MCNC: 1.22 NG/DL (ref 0.53–1.43)
TSH SERPL DL<=0.005 MIU/L-ACNC: 1.6 UIU/ML (ref 0.3–3.7)
VARIANT LYMPHS BLD QL SMEAR: NORMAL
WBC # BLD AUTO: 31.3 K/UL (ref 4.8–10.8)

## 2017-06-21 PROCEDURE — 99284 EMERGENCY DEPT VISIT MOD MDM: CPT

## 2017-06-21 PROCEDURE — 99214 OFFICE O/P EST MOD 30 MIN: CPT | Performed by: PHYSICIAN ASSISTANT

## 2017-06-21 RX ORDER — CIPROFLOXACIN 250 MG/1
250 TABLET, FILM COATED ORAL 2 TIMES DAILY
Qty: 10 TAB | Refills: 0 | Status: SHIPPED | OUTPATIENT
Start: 2017-06-21 | End: 2017-06-26

## 2017-06-21 ASSESSMENT — ENCOUNTER SYMPTOMS
NAUSEA: 0
FOCAL WEAKNESS: 0
COUGH: 0
SORE THROAT: 0
TINGLING: 0
BLOOD IN STOOL: 0
SENSORY CHANGE: 1
WEAKNESS: 1
DOUBLE VISION: 0
VOMITING: 0
ABDOMINAL PAIN: 0
SHORTNESS OF BREATH: 0
PHOTOPHOBIA: 0
HEADACHES: 0
MYALGIAS: 0
PALPITATIONS: 0
CHILLS: 1
WHEEZING: 0
CONSTIPATION: 0
FEVER: 0
BLURRED VISION: 0
DIZZINESS: 0
DIARRHEA: 0

## 2017-06-21 ASSESSMENT — VISUAL ACUITY
OD_CC: 20/50
OS_CC: 20/40

## 2017-06-21 ASSESSMENT — PAIN SCALES - GENERAL: PAINLEVEL_OUTOF10: 0

## 2017-06-21 NOTE — PROGRESS NOTES
"Subjective:      JITENDRA Talley is a 82 y.o. female who presents with Shaking            HPI  The patient is here today with complaints of generalized fatigue, decreased appetite and chills.   The patient was seen approximately 5 days ago and she was treated for a UTI as her UA was positive for leuks and blood. She took 3 days of Bactrim DS BID and has not noticed any improvement in her symptoms.  She continues to feel poorly. She denies any respiratory symptoms, nausea, vomiting, changes in her stool, dysuria, abdominal pain, flank pain, and overt fever. She has not had any headaches, blurry vision, chest pain, shortness of breath, orthopnea, PND, leg swelling. She denies dizziness but states she feels \"off.\" She denies slurred speech, confusion, facial drooping. She has chronic joint pain but nothing out of the ordinary for her. She does complain of some numbness in the first 3 digits of the right hand which has been present for quite some time and remains unchanged. She does have arthritis in both of her hands.  The patient does have a hx of CLL and has chronic leukocytosis. She follows up with her oncologist every year.     Past Medical History   Diagnosis Date   • H/O scarlet fever      age 10   • Hypertension    • Hyperlipidemia    • HSV-1 infection    • HSV-2 infection    • CLL (chronic lymphocytic leukemia) (CMS-[facility]) 3/22/2016       Past Surgical History   Procedure Laterality Date   • Tonsillectomy     • Abdominal hysterectomy total     • Appendectomy     • Open reduction     • Knee arthroplasty total       right       Family History   Problem Relation Age of Onset   • Heart Disease Father    • Heart Attack Father    • Alcohol/Drug Father    • Cancer Sister      skin, not melanoma   • Diabetes Neg Hx    • Stroke Neg Hx        No Known Allergies     Medications, Allergies, and current problem list reviewed today in Epic      Review of Systems   Constitutional: Positive for chills and malaise/fatigue. Negative " for fever.   HENT: Negative for congestion, ear discharge, ear pain and sore throat.    Eyes: Negative for blurred vision, double vision and photophobia.   Respiratory: Negative for cough, shortness of breath and wheezing.    Cardiovascular: Negative for chest pain, palpitations and leg swelling.   Gastrointestinal: Negative for nausea, vomiting, abdominal pain, diarrhea, constipation and blood in stool.   Genitourinary: Negative for dysuria, urgency, frequency and hematuria.   Musculoskeletal: Negative for myalgias and joint pain.   Neurological: Positive for sensory change (chronic numbness in first 3 digits of right hand- distal fingertips) and weakness (generalized ). Negative for dizziness, tingling, focal weakness and headaches.     All other systems reviewed and are negative.        Objective:     /70 mmHg  Pulse 78  Temp(Src) 37.1 °C (98.7 °F)  Resp 16  Wt 65.318 kg (144 lb)  SpO2 97%     Physical Exam   Constitutional: She is oriented to person, place, and time. She appears well-developed and well-nourished. No distress.   HENT:   Head: Normocephalic and atraumatic.   Right Ear: Tympanic membrane, external ear and ear canal normal.   Left Ear: Tympanic membrane, external ear and ear canal normal.   Nose: Nose normal.   Mouth/Throat: Uvula is midline and oropharynx is clear and moist. No oropharyngeal exudate.   Eyes: Conjunctivae and EOM are normal. Pupils are equal, round, and reactive to light.   Neck: Neck supple.   Cardiovascular: Normal rate, regular rhythm and normal heart sounds.  Exam reveals no gallop and no friction rub.    No murmur heard.  Pulmonary/Chest: Effort normal and breath sounds normal. No respiratory distress. She has no wheezes. She has no rales.   Abdominal: Soft. Bowel sounds are normal. She exhibits no distension and no mass. There is no tenderness. There is no rigidity, no rebound, no guarding, no CVA tenderness, no tenderness at McBurney's point and negative Moseley's  sign. No hernia.   Musculoskeletal: Normal range of motion. She exhibits no edema or tenderness.   Right hand distal n/v intact.    Neurological: She is alert and oriented to person, place, and time. No cranial nerve deficit.   CN II-XII intact. Cerebellar function  intact. Motor coordination intact.  strength strong and symmetrical bilaterally. Proprioception intact. Strength 5/5 equal in the upper and lower extremities. Facial features symmetric with equal movement. No focal deficits      Psychiatric: She has a normal mood and affect. Her behavior is normal. Judgment and thought content normal.          UA: positive- trace of leuks       A ssessment/Plan:     1. Fatigue, unspecified type  CBC WITH DIFFERENTIAL    COMP METABOLIC PANEL    ciprofloxacin (CIPRO) 250 MG Tab   2. Urinary tract infection without hematuria, site unspecified  CBC WITH DIFFERENTIAL    COMP METABOLIC PANEL    ciprofloxacin (CIPRO) 250 MG Tab   3. Decreased appetite  CBC WITH DIFFERENTIAL    COMP METABOLIC PANEL    ciprofloxacin (CIPRO) 250 MG Tab       Patient is non-toxic appearing. She gas vague symptoms. Her Neurological exam is normal.   Possible UTI with resistance to Bactrim. Will start Cipro. Culture urine.  Check CBC and  CMP. Follow-up with patient and change treatment plan accordingly/ Patient understands   That I will not get labs back today as [facility] does not have STAT labs.  Strict ER precautions discussed.     Differential diagnoses, Supportive care, and indications for immediate follow-up discussed with patient.   Instructed to return to clinic or nearest emergency department for any change in condition, further concerns, or worsening of symptoms.  The patient demonstrated a good understanding and agreed with the treatment plan.    Erika Mitchell PA-C    ADDENDUM:      Sodium 128 (L) 135 - 145 mmol/L Final     Potassium 5.6 (H) 3.6 - 5.5 mmol/L Final     Chloride 98 96 - 112 mmol/L Final     Co2 23 20 - 33 mmol/L Final      Anion Gap 7.0 0.0 - 11.9 Final     Glucose 89 65 - 99 mg/dL Final     Bun 31 (H) 8 - 22 mg/dL Final     Creatinine 1.59 (H) 0.50 - 1.40 mg/dL Final     Calcium 9.6 8.5 - 10.5 mg/dL Final     AST(SGOT) 15 12 - 45 U/L Final     ALT(SGPT) 9 2 - 50 U/L Final     Alkaline Phosphatase 53 30 - 99 U/L Final     Total Bilirubin 0.5 0.1 - 1.5 mg/dL Final     Albumin 3.7 3.2 - 4.9 g/dL Final     Total Protein 7.6 6.0 - 8.2 g/dL Final     Globulin 3.9 (H) 1.9 - 3.5 g/dL Final     A-G Ratio 0.9 g/dL Final       Narrative      Request patient fasting?->No            ESTIMATED GFR (Order [id number]) - Reflex for Order [id number]         Component Results      Component Value Ref Range & Units Status     GFR If  38 (A) >60 mL/min/1.73 m 2 Final     GFR If Non  31 (A) >60 mL/min/1.73 m 2 Final     Patient had labs done today which showed  ACUTE RENAL INSUFFICIENCY, hyponatremia, and hyperkalemia. CBC is still pending. GFR has decreased substantially since her last labs.  I discussed labs with the patient and suggested further evaluation in the ER based on her symptoms. The patient agreed to go. She states she will have friend drive her. Risks associated with not going to the ER discussed.    The patient demonstrated a good understanding and agreed with the treatment plan.    Erika Mitchell PA-C

## 2017-06-21 NOTE — MR AVS SNAPSHOT
JITENDRA Talley   2017 11:55 AM   Office Visit   MRN: [id number]    Department:  [facility]   Dept Phone:  [phone]    Description:  Female : 1934   Provider:  Erika Mitchell PA-C           Reason for Visit     Shaking difficulty seeing, shaking, vertigo      Allergies as of 2017     No Known Allergies      You were diagnosed with     Fatigue, unspecified type   [2664145]       Urinary tract infection without hematuria, site unspecified   [0234122]       Decreased appetite   [237864]         Vital Signs     Blood Pressure Pulse Temperature Respirations Weight Oxygen Saturation    140/70 mmHg 78 37.1 °C (98.7 °F) 16 65.318 kg (144 lb) 97%    Smoking Status                   Former Smoker           Basic Information     Date Of Birth Sex Race Ethnicity Preferred Language    1934 Female White Non- English      Your appointments     Jul 10, 2017  2:20 PM   Established Patient with Bea Elizabeth M.D.   [address] [address] [phone]           You will be receiving a confirmation call a few days before your appointment from our automated call confirmation system.              Problem List              ICD-10-CM Priority Class Noted - Resolved    Glaucoma H40.9   2015 - Present    Hypertension I10   2015 - Present    Hypothyroidism E03.9   2015 - Present    HSV (herpes simplex virus) infection B00.9   2015 - Present    Osteoarthritis M19.90   2015 - Present    Fall W19.XXXA   3/9/2015 - Present    Hyperlipidemia E78.5   2015 - Present    Renal insufficiency N28.9   2015 - Present    CKD (chronic kidney disease) N18.9   2016 - Present    Vitamin D deficiency disease E55.9   2016 - Present    Post-nasal drip R09.82   2016 - Present    Fatigue due to depression F32.9, R53.83   3/19/2016 - Present    CLL (chronic lymphocytic leukemia) (CMS-HCC) C91.10    3/22/2016 - Present    Left knee pain M25.562   3/29/2016 - Present    Chronic fatigue R53.82   3/29/2016 - Present    Chronic back pain M54.9, G89.29   8/18/2016 - Present      Health Maintenance        Date Due Completion Dates    IMM DTaP/Tdap/Td Vaccine (1 - Tdap) 11/19/1953 ---    IMM ZOSTER VACCINE 11/19/1994 ---    BONE DENSITY 11/19/1999 ---    IMM PNEUMOCOCCAL 65+ (ADULT) HIGH/HIGHEST RISK SERIES (1 of 2 - PCV13) 11/19/1999 ---            Current Immunizations     No immunizations on file.      Below and/or attached are the medications your provider expects you to take. Review all of your home medications and newly ordered medications with your provider and/or pharmacist. Follow medication instructions as directed by your provider and/or pharmacist. Please keep your medication list with you and share with your provider. Update the information when medications are discontinued, doses are changed, or new medications (including over-the-counter products) are added; and carry medication information at all times in the event of emergency situations     Allergies:  No Known Allergies          Medications  Valid as of: June 21, 2017 -  2:21 PM    Generic Name Brand Name Tablet Size Instructions for use    Acetaminophen (Tab) TYLENOL 500 MG Take 1-2 Tabs by mouth 3 times a day as needed for Moderate Pain.        Acyclovir (Ointment) ZOVIRAX 5 % Apply 1 Application to affected area(s) every 3 hours.        Cholecalciferol (Tab) cholecalciferol 1000 UNIT Take 4,000 Units by mouth every day.        Ciprofloxacin HCl (Tab) CIPRO 250 MG Take 1 Tab by mouth 2 times a day for 5 days.        Cyanocobalamin   Take  by mouth.        CycloSPORINE (Emulsion) RESTASIS 0.05 % Place 1 Drop in both eyes 2 times a day.        Diclofenac Sodium (Gel) Diclofenac Sodium 1 % Apply to 2-4 gram to affected area 4 times daily        Diclofenac Sodium (Tablet Delayed Response) VOLTAREN 75 MG Take 1 Tab by mouth 2 times a day.         Ergocalciferol (Cap) DRISDOL 18616 UNIT Take 1 Cap by mouth every 7 days.        Estrogens, Conjugated (Cream) PREMARIN 0.625 MG/GM         Hydrocodone-Acetaminophen (Tab) NORCO 5-325 MG Take 1 Tab by mouth 2 times a day as needed (severe pain).        Levothyroxine Sodium (Tab) SYNTHROID 50 MCG Take 1 Tab by mouth every day.        Lisinopril (Tab) PRINIVIL 20 MG Take 1 Tab by mouth every day.        Lisinopril (Tab) PRINIVIL 20 MG TAKE 1 TABLET BY MOUTH DAILY        Lysine HCl (Tab) lysine 500 MG Take 500 mg by mouth every day.        Oxycodone-Acetaminophen (Tab) PERCOCET 5-325 MG Take 1 Tab by mouth every 6 hours as needed for Moderate Pain.        Timolol Maleate (Solution) TIMOPTIC 0.5 %         Travoprost (Solution) TRAVATAN Z 0.004 %         .                 Medicines prescribed today were sent to:     [facility] - [address]    [address] [place] [address]    Phone: [phone] Fax: [phone]    Open 24 Hours?: No    [id number] - [facility] [address] AT [facility] [address] [address]    Phone: [phone] Fax: [phone]    Open 24 Hours?: No      Medication refill instructions:       If your prescription bottle indicates you have medication refills left, it is not necessary to call your provider’s office. Please contact your pharmacy and they will refill your medication.    If your prescription bottle indicates you do not have any refills left, you may request refills at any time through one of the following ways: The online [employer] system (except Urgent Care), by calling your provider’s office, or by asking your pharmacy to contact your provider’s office with a refill request. Medication refills are processed only during regular business hours and may not be available until the next business day. Your provider may request additional information or to have a follow-up visit with you prior to refilling  your medication.   *Please Note: Medication refills are assigned a new Rx number when refilled electronically. Your pharmacy may indicate that no refills were authorized even though a new prescription for the same medication is available at the pharmacy. Please request the medicine by name with the pharmacy before contacting your provider for a refill.        Your To Do List     Future Labs/Procedures Complete By Expires    CBC WITH DIFFERENTIAL  As directed 6/21/2018    COMP METABOLIC PANEL  As directed 6/21/2018         MyChart Status: Patient Declined

## 2017-06-22 ENCOUNTER — HOSPITAL ENCOUNTER (EMERGENCY)
Facility: MEDICAL CENTER | Age: 82
End: 2017-06-22
Attending: EMERGENCY MEDICINE
Payer: MEDICARE

## 2017-06-22 ENCOUNTER — TELEPHONE (OUTPATIENT)
Dept: URGENT CARE | Facility: CLINIC | Age: 82
End: 2017-06-22

## 2017-06-22 VITALS
HEIGHT: 62 IN | OXYGEN SATURATION: 94 % | RESPIRATION RATE: 16 BRPM | DIASTOLIC BLOOD PRESSURE: 83 MMHG | HEART RATE: 59 BPM | SYSTOLIC BLOOD PRESSURE: 155 MMHG | TEMPERATURE: 96.8 F | WEIGHT: 144.62 LBS | BODY MASS INDEX: 26.61 KG/M2

## 2017-06-22 DIAGNOSIS — F32.A DEPRESSION, UNSPECIFIED DEPRESSION TYPE: ICD-10-CM

## 2017-06-22 DIAGNOSIS — R89.9 ABNORMAL LABORATORY TEST RESULT: ICD-10-CM

## 2017-06-22 DIAGNOSIS — E86.0 DEHYDRATION: ICD-10-CM

## 2017-06-22 LAB
ANION GAP SERPL CALC-SCNC: 9 MMOL/L (ref 0–11.9)
APPEARANCE UR: CLEAR
BILIRUB UR QL STRIP.AUTO: NEGATIVE
BUN SERPL-MCNC: 35 MG/DL (ref 8–22)
CALCIUM SERPL-MCNC: 9.6 MG/DL (ref 8.5–10.5)
CHLORIDE SERPL-SCNC: 99 MMOL/L (ref 96–112)
CO2 SERPL-SCNC: 20 MMOL/L (ref 20–33)
COLOR UR: COLORLESS
CREAT SERPL-MCNC: 1.39 MG/DL (ref 0.5–1.4)
CULTURE IF INDICATED INDCX: NO UA CULTURE
GFR SERPL CREATININE-BSD FRML MDRD: 36 ML/MIN/1.73 M 2
GLUCOSE SERPL-MCNC: 90 MG/DL (ref 65–99)
GLUCOSE UR STRIP.AUTO-MCNC: NEGATIVE MG/DL
KETONES UR STRIP.AUTO-MCNC: NEGATIVE MG/DL
LEUKOCYTE ESTERASE UR QL STRIP.AUTO: NEGATIVE
MICRO URNS: NORMAL
NITRITE UR QL STRIP.AUTO: NEGATIVE
PH UR STRIP.AUTO: 5 [PH]
POTASSIUM SERPL-SCNC: 5.2 MMOL/L (ref 3.6–5.5)
PROT UR QL STRIP: NEGATIVE MG/DL
RBC UR QL AUTO: NEGATIVE
SODIUM SERPL-SCNC: 128 MMOL/L (ref 135–145)
SP GR UR STRIP.AUTO: 1
T4 FREE SERPL-MCNC: 1.18 NG/DL (ref 0.53–1.43)
TSH SERPL DL<=0.005 MIU/L-ACNC: 1.95 UIU/ML (ref 0.3–3.7)

## 2017-06-22 PROCEDURE — 96360 HYDRATION IV INFUSION INIT: CPT

## 2017-06-22 PROCEDURE — 36415 COLL VENOUS BLD VENIPUNCTURE: CPT

## 2017-06-22 PROCEDURE — 80048 BASIC METABOLIC PNL TOTAL CA: CPT

## 2017-06-22 PROCEDURE — 84443 ASSAY THYROID STIM HORMONE: CPT

## 2017-06-22 PROCEDURE — 700105 HCHG RX REV CODE 258: Performed by: EMERGENCY MEDICINE

## 2017-06-22 PROCEDURE — 81003 URINALYSIS AUTO W/O SCOPE: CPT

## 2017-06-22 PROCEDURE — 84439 ASSAY OF FREE THYROXINE: CPT

## 2017-06-22 RX ORDER — SODIUM CHLORIDE 9 MG/ML
1000 INJECTION, SOLUTION INTRAVENOUS ONCE
Status: COMPLETED | OUTPATIENT
Start: 2017-06-22 | End: 2017-06-22

## 2017-06-22 RX ADMIN — SODIUM CHLORIDE 1000 ML: 9 INJECTION, SOLUTION INTRAVENOUS at 02:45

## 2017-06-22 NOTE — DISCHARGE PLANNING
Alert team note:  Provided resources for counseling in [place] and other community support per request of Dr Christopher Ernst.

## 2017-06-22 NOTE — ED PROVIDER NOTES
"CHIEF COMPLAINT  Chief Complaint   Patient presents with   • Abnormal Labs       HPI  JITENDRA Talley is a 82 y.o. female who presents as a referral from [facility] due to concerns of abnormal laboratory studies. She was seen there for concerns of urinary tract infection. Denies any obvious signs of this including no fevers, no dysuria, no abdominal pain, no vomiting, no flank pain. She just had a feeling of depression and that she felt like she was \"going to die\". Denies chest pain, shortness of breath, headache. She is accompanied to the emergency department by family members.     The patient states that she has a very poor relationship with her daughter and she recently visited in [place]. Reports that she has had worsening depression since then. Denies any suicidal or homicidal ideation. No substance abuse history. No prior suicide attempts. She is adamant that she would never commit suicide or harm herself. She has just been feeling down and has no one to speak with regarding this.    According to outside medical record, patient had a potassium of 5.6 and a creatinine of 1.5 with elevated BUN and as results the patient was sent here for further evaluation. They believe that the patient had a urinary tract infection as well and after 3 days of Bactrim, started the patient on ciprofloxacin. Patient took 1st dose today.    REVIEW OF SYSTEMS  See HPI for further details. All other systems are negative.     PAST MEDICAL HISTORY   has a past medical history of H/O scarlet fever; Hypertension; Hyperlipidemia; HSV-1 infection; HSV-2 infection; and CLL (chronic lymphocytic leukemia) (CMS-[facility]) (3/22/2016).    SOCIAL HISTORY  Social History     Social History Main Topics   • Smoking status: Former Smoker -- 0.25 packs/day for 51 years     Types: Cigarettes   • Smokeless tobacco: Never Used   • Alcohol Use: No      Comment: h/o of alcohol abuse   • Drug Use: No   • Sexual Activity:     Partners: Male       SURGICAL HISTORY   " "has past surgical history that includes tonsillectomy; abdominal hysterectomy total; appendectomy; open reduction; and knee arthroplasty total.    CURRENT MEDICATIONS  Home Medications     Reviewed by Fish Pfeiffer R.N. (Registered Nurse) on 06/22/17 at 0212  Med List Status: Complete    Medication Last Dose Status    acetaminophen (TYLENOL) 500 MG Tab PRN Active    acyclovir (ZOVIRAX) 5 % Ointment 1/21/2017 Active    ciprofloxacin (CIPRO) 250 MG Tab 6/21/2017 Active    Cyanocobalamin (VITAMIN B 12 PO) 6/21/2017 Active    cyclosporin (RESTASIS) 0.05 % ophthalmic emulsion 6/21/2017 Active    diclofenac EC (VOLTAREN) 75 MG Tablet Delayed Response 6/21/2017 Active    Diclofenac Sodium 1 % Gel  Active    ergocalciferol (DRISDOL) 99638 UNIT capsule 6/21/2017 Active    hydrocodone-acetaminophen (NORCO) 5-325 MG Tab per tablet  Active    levothyroxine (SYNTHROID) 50 MCG Tab 6/21/2017 Active    lisinopril (PRINIVIL) 20 MG Tab 6/21/2017 Active    lysine 500 MG TABS 6/21/2017 Active    oxycodone-acetaminophen (PERCOCET) 5-325 MG Tab PRN Active    timolol (TIMOPTIC) 0.5 % Solution 6/21/2017 Active    TRAVATAN Z 0.004 % SOLN 6/20/2017 Active    vitamin D (CHOLECALCIFEROL) 1000 UNIT Tab 6/21/2017 Active                ALLERGIES  No Known Allergies    PHYSICAL EXAM  VITAL SIGNS: /83 mmHg  Pulse 75  Temp(Src) 36 °C (96.8 °F)  Resp 16  Ht 1.575 m (5' 2.01\")  Wt 65.6 kg (144 lb 10 oz)  BMI 26.44 kg/m2  SpO2 96%  Pulse ox interpretation: I interpret this pulse ox as normal.  Constitutional: Alert in no apparent distress.  HENT: No signs of trauma, Bilateral external ears normal, Nose normal.   Eyes: Pupils are equal and reactive, Conjunctiva normal, Non-icteric.   Neck: Normal range of motion, No tenderness, Supple, No stridor.   Lymphatic: No lymphadenopathy noted.   Cardiovascular: Regular rate and rhythm, no murmurs.   Thorax & Lungs: Normal breath sounds, No respiratory distress, No wheezing, No chest tenderness. "   Abdomen: Bowel sounds normal, Soft, No tenderness, No masses, No pulsatile masses. No peritoneal signs.  Skin: Warm, Dry, No erythema, No rash.   Back: No bony tenderness, No CVA tenderness.   Extremities: Intact distal pulses, No edema, No tenderness, No cyanosis  Neurologic: Alert , Normal motor function and gait, Normal sensory function, No focal deficits noted.   Psychiatric: Depressed mood      DIAGNOSTIC STUDIES / PROCEDURES    LABS  Labs Reviewed   BASIC METABOLIC PANEL - Abnormal; Notable for the following:     Sodium 128 (*)     Bun 35 (*)     All other components within normal limits   ESTIMATED GFR - Abnormal; Notable for the following:     GFR If  44 (*)     GFR If Non  36 (*)     All other components within normal limits   URINALYSIS,CULTURE IF INDICATED   TSH   FREE THYROXINE       RADIOLOGY  No orders to display       COURSE & MEDICAL DECISION MAKING  Pertinent Labs & Imaging studies reviewed. (See chart for details)  82 y.o. female presenting due to abnormal laboratory studies on outpatient lab tests performed at [facility] along with depression. Denies suicidal or homicidal ideation. Patient is acting appropriately here. She is with family members. Denies dysuria or other urinary symptoms. Normal vitals without fever. No abdominal tenderness or flank tenderness. Patient is in no distress.    Urinalysis was performed here with absolutely no evidence of urinary tract infection. As a result, it was recommended that she not continue taking ciprofloxacin due to risk of development of C. difficile.    Repeat chemistry was performed as well showing slightly elevated BUN and decreased sodium at 128. This may be secondary to dehydration. Patient notes decreased oral intake. She was given IV fluid hydration after these tests were drawn.    Given the patient's depression, thyroid function was tested as well. Found to be unremarkable.    Patient was provided with  "resources regarding outpatient psychiatric management.    All results were reviewed with the patient and family are MRSA bedside. They report understanding and are agreeable with the discharge plan. Return precautions provided for any worsening of the patient's symptoms development of any other concerning signs or symptoms. Otherwise to follow-up with primary care physician further management.    The patient will return for worsening symptoms or failure of improvement and is stable at the time of discharge. The patient verbalizes understanding in their own words.    /83 mmHg  Pulse 75  Temp(Src) 36 °C (96.8 °F)  Resp 16  Ht 1.575 m (5' 2.01\")  Wt 65.6 kg (144 lb 10 oz)  BMI 26.44 kg/m2  SpO2 96%    The patient was referred to primary care where they will receive further BP management.      Bea Elizabeth M.D.  [address] [place] [id number] [phone]    In 2 days      [facility], Emergency Dept  [address] [facility] [address] [phone]    As needed, If symptoms worsen      FINAL IMPRESSION  1. Depression, unspecified depression type    2. Dehydration    3. Abnormal laboratory test result            Electronically signed by: Christopher Ernst, 6/22/2017 3:38 AM      "

## 2017-06-22 NOTE — ED NOTES
Re-evaluation done. Patient happy upon leaving in ER and very thankful. Discharged with instruction. Verbalized understanding.

## 2017-06-22 NOTE — ED AVS SNAPSHOT
Spreadknowledge Access Code: Activation code not generated  Current Spreadknowledge Status: Patient Declined    Your email address is not on file at [facility].  Email Addresses are required for you to sign up for Spreadknowledge, please contact [phone] to verify your personal information and to provide your email address prior to attempting to register for Spreadknowledge.    JITENDRA Dukesbard  [address]  [address]  [place] [address]    Spreadknowledge  A secure, online tool to manage your health information     [facility]’s Spreadknowledge® is a secure, online tool that connects you to your personalized health information from the privacy of your home -- day or night - making it very easy for you to manage your healthcare. Once the activation process is completed, you can even access your medical information using the Spreadknowledge lizet, which is available for free in the Apple Lizet store or Google Play store.     To learn more about Spreadknowledge, visit [email or web address]    There are two levels of access available (as shown below):   My Chart Features  [facility] Primary Care Doctor [facility]  Specialists [facility]  Urgent  Care Non-[facility] Primary Care Doctor   Email your healthcare team securely and privately 24/7 X X X    Manage appointments: schedule your next appointment; view details of past/upcoming appointments X      Request prescription refills. X      View recent personal medical records, including lab and immunizations X X X X   View health record, including health history, allergies, medications X X X X   Read reports about your outpatient visits, procedures, consult and ER notes X X X X   See your discharge summary, which is a recap of your hospital and/or ER visit that includes your diagnosis, lab results, and care plan X X  X     How to register for [facility]:  Once your e-mail address has been verified, follow the following steps to sign up for [facility].     1. Go to  [email or web address]  2. Click on the Sign Up Now box, which takes you to the  New Member Sign Up page. You will need to provide the following information:  a. Enter your [facility] Access Code exactly as it appears at the top of this page. (You will not need to use this code after you’ve completed the sign-up process. If you do not sign up before the expiration date, you must request a new code.)   b. Enter your date of birth.   c. Enter your home email address.   d. Click Submit, and follow the next screen’s instructions.  3. Create a [facility] ID. This will be your [facility] login ID and cannot be changed, so think of one that is secure and easy to remember.  4. Create a [facility] password. You can change your password at any time.  5. Enter your Password Reset Question and Answer. This can be used at a later time if you forget your password.   6. Enter your e-mail address. This allows you to receive e-mail notifications when new information is available in [facility].  7. Click Sign Up. You can now view your health information.    For assistance activating your [facility] account, call [phone]

## 2017-06-22 NOTE — ED AVS SNAPSHOT
Home Care Instructions                                                                                                                JITENDRA Talley   MRN: [id number]    Department:  [facility], Emergency Dept   Date of Visit:  6/21/2017            [facility], Emergency Dept    [address] [place] [address]    Phone:  [phone]      You were seen by     Christopher Ernst M.D.      Your Diagnosis Was     Depression, unspecified depression type     F32.9       These are the medications you received during your hospitalization from 06/21/2017 2123 to 06/22/2017 0400     Date/Time Order Dose Route Action    06/22/2017 0245 NS infusion 1,000 mL 1,000 mL Intravenous New Bag      Follow-up Information     1. Follow up with Bea Elizabeth M.D. In 2 days.    Specialty:  Family Medicine    Contact information    [address] [place] [address] [address] [place] [address] [phone]          2. Follow up with [facility], Emergency Dept.    Specialty:  Emergency Medicine    Why:  As needed, If symptoms worsen    Contact information    [address] [place] [address] [phone]      Medication Information     Review all of your home medications and newly ordered medications with your primary doctor and/or pharmacist as soon as possible. Follow medication instructions as directed by your doctor and/or pharmacist.     Please keep your complete medication list with you and share with your physician. Update the information when medications are discontinued, doses are changed, or new medications (including over-the-counter products) are added; and carry medication information at all times in the event of emergency situations.               Medication List      ASK your doctor about these medications        Instructions    Morning Afternoon Evening Bedtime    acetaminophen 500 MG Tabs   Commonly known as:  TYLENOL        Take 1-2 Tabs by mouth 3 times a day as needed  for Moderate Pain.   Dose:  500-1000 mg                        acyclovir 5 % Oint   Commonly known as:  ZOVIRAX        Apply 1 Application to affected area(s) every 3 hours.   Dose:  1 Application                        ciprofloxacin 250 MG Tabs   Commonly known as:  CIPRO        Take 1 Tab by mouth 2 times a day for 5 days.   Dose:  250 mg                        diclofenac EC 75 MG Tbec   Commonly known as:  VOLTAREN        Take 1 Tab by mouth 2 times a day.   Dose:  75 mg                        Diclofenac Sodium 1 % Gel        Apply to 2-4 gram to affected area 4 times daily                        ergocalciferol 37937 UNIT capsule   Commonly known as:  DRISDOL        Take 1 Cap by mouth every 7 days.   Dose:  [address] Units                        hydrocodone-acetaminophen 5-325 MG Tabs per tablet   Commonly known as:  NORCO        Take 1 Tab by mouth 2 times a day as needed (severe pain).   Dose:  1 Tab                        levothyroxine 50 MCG Tabs   Commonly known as:  SYNTHROID        Take 1 Tab by mouth every day.   Dose:  50 mcg                        lisinopril 20 MG Tabs   Commonly known as:  PRINIVIL        Take 1 Tab by mouth every day.   Dose:  20 mg                        lysine 500 MG Tabs        Take 500 mg by mouth every day.   Dose:  500 mg                        oxycodone-acetaminophen 5-325 MG Tabs   Commonly known as:  PERCOCET        Take 1 Tab by mouth every 6 hours as needed for Moderate Pain.   Dose:  1 Tab                        RESTASIS 0.05 % ophthalmic emulsion   Generic drug:  cyclosporin        Place 1 Drop in both eyes 2 times a day.   Dose:  1 Drop                        timolol 0.5 % Soln   Commonly known as:  TIMOPTIC                             TRAVATAN Z 0.004 % Soln   Generic drug:  travoprost                             VITAMIN B 12 PO        Take  by mouth.                        vitamin D 1000 UNIT Tabs   Commonly known as:  cholecalciferol        Take 4,000 Units by mouth  every day.   Dose:  4000 Units                                Procedures and tests performed during your visit     BASIC METABOLIC PANEL    ESTIMATED GFR    FREE THYROXINE    IV Saline Lock    TSH    URINALYSIS CULTURE, IF INDICATED            Patient Information     Patient Information    Following emergency treatment: all patient requiring follow-up care must return either to a private physician or a clinic if your condition worsens before you are able to obtain further medical attention, please return to the emergency room.     Billing Information    At [facility], we work to make the billing process streamlined for our patients.  Our Representatives are here to answer any questions you may have regarding your hospital bill.  If you have insurance coverage and have supplied your insurance information to us, we will submit a claim to your insurer on your behalf.  Should you have any questions regarding your bill, we can be reached online or by phone as follows:  Online: You are able pay your bills online or live chat with our representatives about any billing questions you may have. We are here to help Monday - Friday from 8:00am to 7:30pm and 9:00am - 12:00pm on Saturdays.  Please visit [email or web address]  for more information.   Phone:  [phone] or [phone]    Please note that your emergency physician, surgeon, pathologist, radiologist, anesthesiologist, and other specialists are not employed by [facility] and will therefore bill separately for their services.  Please contact them directly for any questions concerning their bills at the numbers below:     Emergency Physician Services:  [phone]  [facility]:  [phone]  [facility]:  [phone]  [facility]:  [phone]    1. Your final bill may vary from the amount quoted upon discharge if all procedures are not complete at that time, or if your doctor has  additional procedures of which we are not aware. You will receive an additional bill if you return to the Emergency Department at [facility] for suture removal regardless of the facility of which the sutures were placed.     2. Please arrange for settlement of this account at the emergency registration.    3. All self-pay accounts are due in full at the time of treatment.  If you are unable to meet this obligation then payment is expected within 4-5 days.     4. If you have had radiology studies (CT, X-ray, Ultrasound, MRI), you have received a preliminary result during your emergency department visit. Please contact the radiology department [phone] to receive a copy of your final result. Please discuss the Final result with your primary physician or with the follow up physician provided.     Crisis Hotline:  [place] Crisis Hotline:  2-860-EHNPSMS or 1-492.925.2307  [place] Crisis Hotline:    1-978.231.8753 or 287-277-1616         ED Discharge Follow Up Questions    1. In order to provide you with very good care, we would like to follow up with a phone call in the next few days.  May we have your permission to contact you?     YES /  NO    2. What is the best phone number to call you? (       )_____-__________    3. What is the best time to call you?      Morning  /  Afternoon  /  Evening                   Patient Signature:  ____________________________________________________________    Date:  ____________________________________________________________      Your appointments     Jul 10, 2017  2:20 PM   Established Patient with Bea Elizabeth M.D.   [facility] [place]) [address] [address] [phone]           You will be receiving a confirmation call a few days before your appointment from our automated call confirmation system.

## 2017-06-22 NOTE — ED AVS SNAPSHOT
6/22/2017    JITENDRA Talley  [address]  [address]  [place] [address]    Dear JITENDRA:    Counts include 234 beds at the [facility] wants to ensure your discharge home is safe and you or your loved ones have had all of your questions answered regarding your care after you leave the hospital.    Below is a list of resources and contact information should you have any questions regarding your hospital stay, follow-up instructions, or active medical symptoms.    Questions or Concerns Regarding… Contact   Medical Questions Related to Your Discharge  (7 days a week, 8am-5pm) Contact a Nurse Care Coordinator   [phone]   Medical Questions Not Related to Your Discharge  (24 hours a day / 7 days a week)  Contact the Nurse Health Line   [phone]    Medications or Discharge Instructions Refer to your discharge packet   or contact your [facility] Primary Care Provider   [phone]   Follow-up Appointment(s) Schedule your appointment via [employer]   or contact Scheduling [phone]   Billing Review your statement via [employer]  or contact Billing [phone]   Medical Records Review your records via [employer]   or contact Medical Records [phone]     You may receive a telephone call within two days of discharge. This call is to make certain you understand your discharge instructions and have the opportunity to have any questions answered. You can also easily access your medical information, test results and upcoming appointments via the [employer] free online health management tool. You can learn more and sign up at [employer]. For assistance setting up your [employer] account, please call [phone].    Once again, we want to ensure your discharge home is safe and that you have a clear understanding of any next steps in your care. If you have any questions or concerns, please do not hesitate to contact us, we are here for you. Thank you for choosing [facility] for your healthcare needs.    Sincerely,    Your [facility] Healthcare Team

## 2017-06-22 NOTE — ED NOTES
.  Chief Complaint   Patient presents with   • Abnormal Labs     Patient sent in after recent visits to urgent care, called by PA for abnormal potassium, abnormal kidney function.  Patient treated recently for UTI, put on Ciprofloxacin today by PA.      Patient states that she has been feeling down lately, overwhelmed by recent travel and life events.  Denies SI/HI.  Accompanied by friend.    Patient educated on triage process and wait time.  Instructed to notify staff for worsening or new symptoms.  [facility]

## 2017-06-23 NOTE — TELEPHONE ENCOUNTER
Discussed CBC with patient.WBC is at 31.3. Patient has a hx of CLL. She reports that it has been that high in the past. It is substantially higher than her last CBC done in 8/2016 when it was 26.1. She was evaluated in the ER last night for dehydration which could have been a culprit. Advised the patient to call her Oncologist tomorrow to make him aware of her recent increase and to schedule a follow-up ASAP.  The patient verbalized understanding.

## 2017-06-27 ENCOUNTER — TELEPHONE (OUTPATIENT)
Dept: MEDICAL GROUP | Facility: PHYSICIAN GROUP | Age: 82
End: 2017-06-27

## 2017-06-27 NOTE — TELEPHONE ENCOUNTER
VOICEMAIL  1. Caller Name: Karla                    Call Back Number: [phone] (home)     2. Message: Karla seen through ER and requesting earlier appointment than July 10. Returned call, no answer, left message to please return call.    3. Patient approves office to leave a detailed voicemail/MyChart message:

## 2017-06-28 ENCOUNTER — OFFICE VISIT (OUTPATIENT)
Dept: MEDICAL GROUP | Facility: PHYSICIAN GROUP | Age: 82
End: 2017-06-28
Payer: MEDICARE

## 2017-06-28 VITALS
OXYGEN SATURATION: 96 % | HEART RATE: 57 BPM | SYSTOLIC BLOOD PRESSURE: 126 MMHG | BODY MASS INDEX: 26.61 KG/M2 | TEMPERATURE: 97.5 F | DIASTOLIC BLOOD PRESSURE: 88 MMHG | RESPIRATION RATE: 16 BRPM | WEIGHT: 144.6 LBS | HEIGHT: 62 IN

## 2017-06-28 DIAGNOSIS — M79.641 BILATERAL HAND PAIN: ICD-10-CM

## 2017-06-28 DIAGNOSIS — F41.9 ANXIETY: ICD-10-CM

## 2017-06-28 DIAGNOSIS — G89.29 CHRONIC RIGHT SHOULDER PAIN: ICD-10-CM

## 2017-06-28 DIAGNOSIS — C91.10 CLL (CHRONIC LYMPHOCYTIC LEUKEMIA) (HCC): ICD-10-CM

## 2017-06-28 DIAGNOSIS — M79.642 BILATERAL HAND PAIN: ICD-10-CM

## 2017-06-28 DIAGNOSIS — M25.511 CHRONIC RIGHT SHOULDER PAIN: ICD-10-CM

## 2017-06-28 PROCEDURE — 99214 OFFICE O/P EST MOD 30 MIN: CPT | Performed by: NURSE PRACTITIONER

## 2017-06-28 RX ORDER — ESCITALOPRAM OXALATE 10 MG/1
10 TABLET ORAL DAILY
Qty: 90 TAB | Refills: 1 | Status: SHIPPED | OUTPATIENT
Start: 2017-06-28 | End: 2017-11-03 | Stop reason: SDUPTHER

## 2017-06-28 RX ORDER — ALPRAZOLAM 0.25 MG/1
TABLET ORAL
COMMUNITY
Start: 2017-06-25 | End: 2017-06-28

## 2017-06-28 NOTE — ASSESSMENT & PLAN NOTE
Patient is requesting referral to physical therapy for chronic right shoulder pain as well as bilateral hand pain. She is already going to physical therapy for chronic pain in multiple joints associated with osteoarthritis. Referral has been placed for her.

## 2017-06-28 NOTE — ASSESSMENT & PLAN NOTE
Patient does have history of CLL and is followed by Dr. Miller every 6 months. She was recently in the ER ×2 in the last month for anxiety and it was noted on her CBC that her white count was high as 31, which they think might be due to dehydration. She has been advised to follow-up with oncology, she states that she has been trying to call and get an appointment, but for some reason has been unable to get through. I strongly encouraged her today to be sure she keeps calling and makes an appointment with him as soon as possible.

## 2017-06-28 NOTE — ASSESSMENT & PLAN NOTE
"This is a chronic condition, she has quite a bit of anxiety surrounding family strife. She has been seen in the ER twice in the last few weeks, once she was transferred via ambulance. She has been seen at [facility] and at [facility]. Patient has very stressful relationships between family members. She recently returned home from [facility] for one of her granddaughters high school graduations and she tells me that her and her daughter did not get along well at all and that her daughter constantly \"picked\" at her.   She was discharged from [facility] with a prescription for Xanax 0.5 mg, one pill to be taken every 8 hours for 5 days, but she was only given 5 pills. However, she states that she is not interested in continuing this medication. She has been taking a Benadryl every night to help her get to sleep. I had long discussion with her about treating her anxiety long-term with medications such as SSRIs. Patient was agreeable to this. I did discuss with her risks, benefits and side effects of these medications and that full efficacy can take anywhere from 4-6 weeks. I encouraged her to eventually stop taking the Benadryl once the SSRI becomes fully effective. I will have her start escitalopram, 10 mg daily. I will have her follow-up with me in 2 months, sooner if needed.    "

## 2017-06-28 NOTE — PATIENT INSTRUCTIONS
Cut fluids to 64 ounces a day    Follow up with me in 3 months, sooner if needed    lexapro 10 mg daily, ok to take benadryl until lexapro take full effect    Referral to PT

## 2017-06-28 NOTE — MR AVS SNAPSHOT
"        JITENDRA Dukesbard   2017 10:20 AM   Office Visit   MRN: [id number]    Department:  [facility]   Dept Phone:  [phone]    Description:  Female : 1934   Provider:  DU Guardado           Reason for Visit     Follow-Up fv emergency room      Allergies as of 2017     No Known Allergies      You were diagnosed with     Anxiety   [235371]       Chronic right shoulder pain   [178794]       Bilateral hand pain   [837678]         Vital Signs     Blood Pressure Pulse Temperature Respirations Height Weight    126/88 mmHg 57 36.4 °C (97.5 °F) 16 1.575 m (5' 2\") 65.59 kg (144 lb 9.6 oz)    Body Mass Index Oxygen Saturation Smoking Status             26.44 kg/m2 96% Former Smoker         Basic Information     Date Of Birth Sex Race Ethnicity Preferred Language    1934 Female White Non- English      Your appointments     Aug 28, 2017 10:20 AM   Established Patient with DU Guardado   [address] [address] [phone]           You will be receiving a confirmation call a few days before your appointment from our automated call confirmation system.              Problem List              ICD-10-CM Priority Class Noted - Resolved    Glaucoma H40.9   2015 - Present    Hypertension I10   2015 - Present    Hypothyroidism E03.9   2015 - Present    HSV (herpes simplex virus) infection B00.9   2015 - Present    Osteoarthritis M19.90   2015 - Present    Fall W19.XXXA   3/9/2015 - Present    Hyperlipidemia E78.5   2015 - Present    Renal insufficiency N28.9   2015 - Present    CKD (chronic kidney disease) N18.9   2016 - Present    Vitamin D deficiency disease E55.9   2016 - Present    Post-nasal drip R09.82   2016 - Present    Fatigue due to depression F32.9, R53.83   3/19/2016 - Present    CLL (chronic lymphocytic leukemia) (CMS-HCC) C91.10   " 3/22/2016 - Present    Left knee pain M25.562   3/29/2016 - Present    Chronic fatigue R53.82   3/29/2016 - Present    Chronic back pain M54.9, G89.29   8/18/2016 - Present    Anxiety F41.9   6/28/2017 - Present      Health Maintenance        Date Due Completion Dates    IMM DTaP/Tdap/Td Vaccine (1 - Tdap) 11/19/1953 ---    IMM ZOSTER VACCINE 11/19/1994 ---    BONE DENSITY 11/19/1999 ---    IMM PNEUMOCOCCAL 65+ (ADULT) HIGH/HIGHEST RISK SERIES (1 of 2 - PCV13) 11/19/1999 ---            Current Immunizations     No immunizations on file.      Below and/or attached are the medications your provider expects you to take. Review all of your home medications and newly ordered medications with your provider and/or pharmacist. Follow medication instructions as directed by your provider and/or pharmacist. Please keep your medication list with you and share with your provider. Update the information when medications are discontinued, doses are changed, or new medications (including over-the-counter products) are added; and carry medication information at all times in the event of emergency situations     Allergies:  No Known Allergies          Medications  Valid as of: June 28, 2017 - 11:01 AM    Generic Name Brand Name Tablet Size Instructions for use    Acetaminophen (Tab) TYLENOL 500 MG Take 1-2 Tabs by mouth 3 times a day as needed for Moderate Pain.        Acyclovir (Ointment) ZOVIRAX 5 % Apply 1 Application to affected area(s) every 3 hours.        ALPRAZolam (Tab) XANAX 0.25 MG         Cholecalciferol (Tab) cholecalciferol 1000 UNIT Take 4,000 Units by mouth every day.        Cyanocobalamin   Take  by mouth.        CycloSPORINE (Emulsion) RESTASIS 0.05 % Place 1 Drop in both eyes 2 times a day.        Diclofenac Sodium (Gel) Diclofenac Sodium 1 % Apply to 2-4 gram to affected area 4 times daily        Ergocalciferol (Cap) DRISDOL 58458 UNIT Take 1 Cap by mouth every 7 days.        Escitalopram Oxalate (Tab) LEXAPRO 10  MG Take 1 Tab by mouth every day.        Hydrocodone-Acetaminophen (Tab) NORCO 5-325 MG Take 1 Tab by mouth 2 times a day as needed (severe pain).        Levothyroxine Sodium (Tab) SYNTHROID 50 MCG Take 1 Tab by mouth every day.        Lisinopril (Tab) PRINIVIL 20 MG Take 1 Tab by mouth every day.        Lysine HCl (Tab) lysine 500 MG Take 500 mg by mouth every day.        Oxycodone-Acetaminophen (Tab) PERCOCET 5-325 MG Take 1 Tab by mouth every 6 hours as needed for Moderate Pain.        Timolol Maleate (Solution) TIMOPTIC 0.5 %         Travoprost (Solution) TRAVATAN Z 0.004 %         .                 Medicines prescribed today were sent to:     [facility] [address] [facility]    [address] [place] [address]    Phone: [phone] Fax: [phone]    Open 24 Hours?: No    [facility] [address] [facility] [address]  AT [place] [address] & [place]    [address] [place] [address] [address] [facility] [address]    Phone: [phone] Fax: [phone]    Open 24 Hours?: No      Medication refill instructions:       If your prescription bottle indicates you have medication refills left, it is not necessary to call your provider’s office. Please contact your pharmacy and they will refill your medication.    If your prescription bottle indicates you do not have any refills left, you may request refills at any time through one of the following ways: The online [employer] system (except Urgent Care), by calling your provider’s office, or by asking your pharmacy to contact your provider’s office with a refill request. Medication refills are processed only during regular business hours and may not be available until the next business day. Your provider may request additional information or to have a follow-up visit with you prior to refilling your medication.   *Please Note: Medication refills are assigned a new Rx number when refilled electronically. Your pharmacy may indicate that no refills were  authorized even though a new prescription for the same medication is available at the pharmacy. Please request the medicine by name with the pharmacy before contacting your provider for a refill.        Referral     A referral request has been sent to our patient care coordination department. Please allow 3-5 business days for us to process this request and contact you either by phone or mail. If you do not hear from us by the 5th business day, please call us at [phone].        Instructions    Cut fluids to 64 ounces a day    Follow up with me in 3 months, sooner if needed    lexapro 10 mg daily, ok to take benadryl until lexapro take full effect    Referral to PT          Suresh Status: Patient Declined

## 2017-06-28 NOTE — PROGRESS NOTES
"Chief Complaint   Patient presents with   • Follow-Up     [facility] emergency room         This is a 82 y.o.female patient that presents today with the following: Follow-up ER visits for anxiety, other acute and chronic conditions    Anxiety  This is a chronic condition, she has quite a bit of anxiety surrounding family strife. She has been seen in the ER twice in the last few weeks, once she was transferred via ambulance. She has been seen at [facility] and at [facility]. Patient has very stressful relationships between family members. She recently returned home from [facility] for one of her granddaughters high school graduations and she tells me that her and her daughter did not get along well at all and that her daughter constantly \"picked\" at her.   She was discharged from [facility] with a prescription for Xanax 0.5 mg, one pill to be taken every 8 hours for 5 days, but she was only given 5 pills. However, she states that she is not interested in continuing this medication. She has been taking a Benadryl every night to help her get to sleep. I had long discussion with her about treating her anxiety long-term with medications such as SSRIs. Patient was agreeable to this. I did discuss with her risks, benefits and side effects of these medications and that full efficacy can take anywhere from 4-6 weeks. I encouraged her to eventually stop taking the Benadryl once the SSRI becomes fully effective. I will have her start escitalopram, 10 mg daily. I will have her follow-up with me in 2 months, sooner if needed.      Chronic right shoulder pain  Patient is requesting referral to physical therapy for chronic right shoulder pain as well as bilateral hand pain. She is already going to physical therapy for chronic pain in multiple joints associated with osteoarthritis. Referral has been placed for her.    Bilateral hand pain  Referral has been placed to PT (see additional notes under " chronic right shoulder pain)    CLL (chronic lymphocytic leukemia) (CMS-HCC)  Patient does have history of CLL and is followed by Dr. Miller every 6 months. She was recently in the ER ×2 in the last month for anxiety and it was noted on her CBC that her white count was high as 31, which they think might be due to dehydration. She has been advised to follow-up with oncology, she states that she has been trying to call and get an appointment, but for some reason has been unable to get through. I strongly encouraged her today to be sure she keeps calling and makes an appointment with him as soon as possible.      Admission on 06/22/2017, Discharged on 06/22/2017   Component Date Value   • Sodium 06/22/2017 128*   • Potassium 06/22/2017 5.2    • Chloride 06/22/2017 99    • Co2 06/22/2017 20    • Glucose 06/22/2017 90    • Bun 06/22/2017 35*   • Creatinine 06/22/2017 1.39    • Calcium 06/22/2017 9.6    • Anion Gap 06/22/2017 9.0    • Color 06/22/2017 Colorless    • Character 06/22/2017 Clear    • Specific Gravity 06/22/2017 1.003    • Ph 06/22/2017 5.0    • Glucose 06/22/2017 Negative    • Ketones 06/22/2017 Negative    • Protein 06/22/2017 Negative    • Bilirubin 06/22/2017 Negative    • Nitrite 06/22/2017 Negative    • Leukocyte Esterase 06/22/2017 Negative    • Occult Blood 06/22/2017 Negative    • Micro Urine Req 06/22/2017 see below    • Culture Indicated 06/22/2017 No    • TSH 06/22/2017 1.950    • Free T-4 06/22/2017 1.18    • GFR If  06/22/2017 44*   • GFR If Non  [employer]meri* 06/22/2017 36*   Hospital Outpatient Visit on 06/21/2017   Component Date Value   • 25-Hydroxy   Vitamin D 25 06/21/2017 36    • TSH 06/21/2017 1.600    • Free T-4 06/21/2017 1.22    Hospital Outpatient Visit on 06/21/2017   Component Date Value   • WBC 06/21/2017 31.3*   • RBC 06/21/2017 4.70    • Hemoglobin 06/21/2017 14.0    • Hematocrit 06/21/2017 43.9    • MCV 06/21/2017 93.4    • MCH 06/21/2017 29.8    • MCHC  06/21/2017 31.9*   • RDW 06/21/2017 46.0    • Platelet Count 06/21/2017 352    • MPV 06/21/2017 12.0    • Nucleated RBC 06/21/2017 0.00    • NRBC (Absolute) 06/21/2017 0.00    • Neutrophils-Polys 06/21/2017 27.80*   • Lymphocytes 06/21/2017 68.50*   • Monocytes 06/21/2017 1.90    • Eosinophils 06/21/2017 0.90    • Basophils 06/21/2017 0.00    • Neutrophils (Absolute) 06/21/2017 8.98*   • Lymphs (Absolute) 06/21/2017 21.44*   • Monos (Absolute) 06/21/2017 0.59    • Eos (Absolute) 06/21/2017 0.28    • Baso (Absolute) 06/21/2017 0.00    • Anisocytosis 06/21/2017 1+    • Sodium 06/21/2017 128*   • Potassium 06/21/2017 5.6*   • Chloride 06/21/2017 98    • Co2 06/21/2017 23    • Anion Gap 06/21/2017 7.0    • Glucose 06/21/2017 89    • Bun 06/21/2017 31*   • Creatinine 06/21/2017 1.59*   • Calcium 06/21/2017 9.6    • AST(SGOT) 06/21/2017 15    • ALT(SGPT) 06/21/2017 9    • Alkaline Phosphatase 06/21/2017 53    • Total Bilirubin 06/21/2017 0.5    • Albumin 06/21/2017 3.7    • Total Protein 06/21/2017 7.6    • Globulin 06/21/2017 3.9*   • A-G Ratio 06/21/2017 0.9    • GFR If  06/21/2017 38*   • GFR If Non  [employer]meri* 06/21/2017 31*   • RBC Morphology 06/21/2017 Present    • Smudge Cells 06/21/2017 Moderate    • Reactive Lymphocytes 06/21/2017 Few    • Peripheral Smear Review 06/21/2017 see below    • Bands-Stabs 06/21/2017 0.90    • Manual Diff Status 06/21/2017 PERFORMED    • Plt Estimation 06/21/2017 Normal    Office Visit on 06/16/2017   Component Date Value   • Glucose - Accu-Ck 06/16/2017 86    • POC Color 06/16/2017 yellow    • POC Appearance 06/16/2017 clear    • POC Leukocyte Esterase 06/16/2017 moderate    • POC Nitrites 06/16/2017 neg    • POC Urobiligen 06/16/2017 neg    • POC Protein 06/16/2017 neg    • POC Urine PH 06/16/2017 5    • POC Blood 06/16/2017 moderate    • POC Specific Gravity 06/16/2017 1.005    • POC Ketones 06/16/2017 5    • POC Biliruben 06/16/2017 neg    • POC Glucose  06/16/2017 neg          clinical course has been stable    Past Medical History   Diagnosis Date   • H/O scarlet fever      age 10   • Hypertension    • Hyperlipidemia    • HSV-1 infection    • HSV-2 infection    • CLL (chronic lymphocytic leukemia) (CMS-HCC) 3/22/2016       Past Surgical History   Procedure Laterality Date   • Tonsillectomy     • Abdominal hysterectomy total     • Appendectomy     • Open reduction     • Knee arthroplasty total       right       Family History   Problem Relation Age of Onset   • Heart Disease Father    • Heart Attack Father    • Alcohol/Drug Father    • Cancer Sister      skin, not melanoma   • Diabetes Neg Hx    • Stroke Neg Hx        Review of patient's allergies indicates no known allergies.    Current Outpatient Prescriptions Ordered in [facility]   Medication Sig Dispense Refill   • escitalopram (LEXAPRO) 10 MG Tab Take 1 Tab by mouth every day. 90 Tab 1   • acyclovir (ZOVIRAX) 5 % Ointment Apply 1 Application to affected area(s) every 3 hours. 1 Tube 6   • timolol (TIMOPTIC) 0.5 % Solution      • cyclosporin (RESTASIS) 0.05 % ophthalmic emulsion Place 1 Drop in both eyes 2 times a day.     • levothyroxine (SYNTHROID) 50 MCG Tab Take 1 Tab by mouth every day. 90 Tab 3   • lisinopril (PRINIVIL) 20 MG Tab Take 1 Tab by mouth every day. 90 Tab 3   • Cyanocobalamin (VITAMIN B 12 PO) Take  by mouth.     • vitamin D (CHOLECALCIFEROL) 1000 UNIT Tab Take 4,000 Units by mouth every day.     • lysine 500 MG TABS Take 500 mg by mouth every day.     • TRAVATAN Z 0.004 % SOLN      • oxycodone-acetaminophen (PERCOCET) 5-325 MG Tab Take 1 Tab by mouth every 6 hours as needed for Moderate Pain. 20 Tab 0   • hydrocodone-acetaminophen (NORCO) 5-325 MG Tab per tablet Take 1 Tab by mouth 2 times a day as needed (severe pain). 60 Tab 0   • acetaminophen (TYLENOL) 500 MG Tab Take 1-2 Tabs by mouth 3 times a day as needed for Moderate Pain. 90 Tab 1   • Diclofenac Sodium 1 % Gel Apply to 2-4 gram to  "affected area 4 times daily 100 g 2   • ergocalciferol (DRISDOL) 56778 UNIT capsule Take 1 Cap by mouth every 7 days. 12 Cap 0     No current Epic-ordered facility-administered medications on file.       Constitutional ROS: No unexpected change in weight, No weakness, No unexplained fevers, sweats, or chills  Pulmonary ROS: No chronic cough, sputum, or hemoptysis, No shortness of breath, No recent change in breathing  Cardiovascular ROS: No chest pain, No edema, No palpitations  Gastrointestinal ROS: No abdominal pain, No nausea, vomiting, diarrhea, or constipation, no blood in stool  Hematology/oncology ROS: Positive per history of present illness  Musculoskeletal/Extremities ROS: Positive per history of present illness  Neurologic ROS: Normal development, No seizures, No weakness  Psychiatric ROS: Positive per history of present illness    Physical exam:  /88 mmHg  Pulse 57  Temp(Src) 36.4 °C (97.5 °F)  Resp 16  Ht 1.575 m (5' 2\")  Wt 65.59 kg (144 lb 9.6 oz)  BMI 26.44 kg/m2  SpO2 96%  General Appearance: Elderly female, alert, no distress, well-nourished, well-groomed  Skin: Skin color, texture, turgor normal. No rashes or lesions.  Lungs: negative findings: normal respiratory rate and rhythm, lungs clear to auscultation  Heart: negative. RRR without murmur, gallop, or rubs.  No ectopy.  Abdomen: Abdomen soft, non-tender. BS normal. No masses,  No organomegaly  Musculoskeletal: positive findings: Decreased range of motion to multiple joints in bilateral hands as well as right shoulder due to pain  Neurologic: intact, oriented, mood appropriate, judgment intact. Cranial nerves II through XII grossly intact    Medical decision making/discussion: We'll have patient start escitalopram 10 mg daily. I did discuss with her the risks, benefits and side effects of this medication. I did advise her that the efficacy can take anywhere from 4-6 weeks. She can continue taking Benadryl, one pill at bedtime as " needed for insomnia until Lexapro becomes fully effective, then she can discontinue. Physical therapy referral has been placed as requested. She is to follow-up with her oncology as soon as possible. I would like to see her back in 2 months for follow-up, sooner if needed.    JITENDRA was seen today for follow-up.    Diagnoses and all orders for this visit:    Anxiety  -     escitalopram (LEXAPRO) 10 MG Tab; Take 1 Tab by mouth every day.    Chronic right shoulder pain  -     REFERRAL TO PHYSICAL THERAPY Reason for Therapy: Eval/Treat/Report    Bilateral hand pain  -     REFERRAL TO PHYSICAL THERAPY Reason for Therapy: Eval/Treat/Report    CLL (chronic lymphocytic leukemia) (CMS-[facility])          Please note that this dictation was created using voice recognition software. I have made every reasonable attempt to correct obvious errors, but I expect that there are errors of grammar and possibly content that I did not discover before finalizing the note.

## 2017-07-07 RX ORDER — ACYCLOVIR 50 MG/G
1 OINTMENT TOPICAL
Qty: 1 TUBE | Refills: 6 | Status: SHIPPED | OUTPATIENT
Start: 2017-07-07 | End: 2019-04-11 | Stop reason: SDUPTHER

## 2017-07-10 ENCOUNTER — PATIENT OUTREACH (OUTPATIENT)
Dept: HEALTH INFORMATION MANAGEMENT | Facility: OTHER | Age: 82
End: 2017-07-10

## 2017-07-10 DIAGNOSIS — M25.50 PAIN, JOINT, MULTIPLE SITES: ICD-10-CM

## 2017-07-10 RX ORDER — OXYCODONE HYDROCHLORIDE AND ACETAMINOPHEN 5; 325 MG/1; MG/1
1 TABLET ORAL EVERY 6 HOURS PRN
OUTPATIENT
Start: 2017-07-10

## 2017-07-10 NOTE — TELEPHONE ENCOUNTER
I will not be able to fill this without a visit--it would be against our controlled substance policy. It is stated that lost or stolen prescriptions will not be replaced.

## 2017-07-10 NOTE — TELEPHONE ENCOUNTER
1. Caller Name: Padmini - [facility] nursing                                         Call Back Number: [phone]      Patient approves a detailed voicemail message: N\A    pt went into [facility] this weekend and a slip was filled out that had stated the pt's medications she came in with were locked in a box and sent down to pharmacy.   They are unable to locate pt medications and the physician at the hospital refilled meds except for the percocet and he needs us to fill.  the hospital is investigating this situation as we speak.

## 2017-07-14 ENCOUNTER — TELEPHONE (OUTPATIENT)
Dept: MEDICAL GROUP | Facility: PHYSICIAN GROUP | Age: 82
End: 2017-07-14

## 2017-07-14 ENCOUNTER — OFFICE VISIT (OUTPATIENT)
Dept: MEDICAL GROUP | Facility: PHYSICIAN GROUP | Age: 82
End: 2017-07-14
Payer: MEDICARE

## 2017-07-14 VITALS
RESPIRATION RATE: 16 BRPM | HEART RATE: 66 BPM | DIASTOLIC BLOOD PRESSURE: 68 MMHG | HEIGHT: 62 IN | WEIGHT: 148 LBS | TEMPERATURE: 98.6 F | SYSTOLIC BLOOD PRESSURE: 124 MMHG | BODY MASS INDEX: 27.23 KG/M2 | OXYGEN SATURATION: 98 %

## 2017-07-14 DIAGNOSIS — E87.1 HYPONATREMIA: ICD-10-CM

## 2017-07-14 DIAGNOSIS — E87.5 HYPERKALEMIA: ICD-10-CM

## 2017-07-14 DIAGNOSIS — Z79.891 CHRONIC USE OF OPIATE DRUGS THERAPEUTIC PURPOSES: ICD-10-CM

## 2017-07-14 DIAGNOSIS — M25.50 PAIN, JOINT, MULTIPLE SITES: ICD-10-CM

## 2017-07-14 DIAGNOSIS — F41.0 PANIC ATTACK: ICD-10-CM

## 2017-07-14 PROCEDURE — 99214 OFFICE O/P EST MOD 30 MIN: CPT | Performed by: NURSE PRACTITIONER

## 2017-07-14 RX ORDER — HYDROXYZINE HYDROCHLORIDE 25 MG/1
TABLET, FILM COATED ORAL
Qty: 60 TAB | Refills: 0 | Status: SHIPPED | OUTPATIENT
Start: 2017-07-14 | End: 2018-08-22

## 2017-07-14 RX ORDER — OXYCODONE HYDROCHLORIDE AND ACETAMINOPHEN 5; 325 MG/1; MG/1
1 TABLET ORAL
Qty: 45 TAB | Refills: 0 | Status: SHIPPED | OUTPATIENT
Start: 2017-07-14 | End: 2018-08-22

## 2017-07-14 ASSESSMENT — ENCOUNTER SYMPTOMS: DEPRESSION: 1

## 2017-07-14 ASSESSMENT — LIFESTYLE VARIABLES: HISTORY_ALCOHOL_USE: 1

## 2017-07-14 NOTE — TELEPHONE ENCOUNTER
Patient would like to know if a lack of mobility could cause her potassium level to go up. OK to leave voicemail.

## 2017-07-14 NOTE — ASSESSMENT & PLAN NOTE
Patient had a panic attack on Saturday the 8th of July and was seen in [facility]--she was admitted for 2 days. During her treatment she reports that she received IV fluids. She was told that she had low sodium level and a high potassium level, will be still have not received those notes--thus I'm not sure her levels were. We have requested notes from [facility]. She was told upon discharge to decrease her fluid intake and to watch her diet and avoid foods high in potassium. She does not take diuretics and she reports to me that she does not eat foods high in potassium. We will recheck labs in a week or so she has decreased her fluid intake. I encouraged her to taken no more than 48-60 ounces per day.  She is to continue on her Lexapro, 10 mg daily. She states this is starting to really help with her anxiety and depression. She was strongly encouraged not to take the Xanax as she is now on a controlled substance agreement with me and she's been prescribed Percocet. I do not have a concern about weaning her off of this as she has only received recently 2 small prescriptions from her last 2 ER visits within a week of each other in which she received a prescription for 5 pills on one occasion, and then 12 pills on the other. I will have her try hydroxyzine, 25 mg one half to one full pill up to 3 times a day as needed for breakthrough anxiety and panic attack. Patient verbalized understanding and was agreeable to this plan. I will see her back in 3 months, sooner if needed.

## 2017-07-14 NOTE — MR AVS SNAPSHOT
"        JITENDRA Gerri   2017 1:00 PM   Office Visit   MRN: [id number]    Department:  [facility]   Dept Phone:  [phone]    Description:  Female : 1934   Provider:  DU Guardado           Reason for Visit     Hospital Follow-up [facility]      Allergies as of 2017     No Known Allergies      You were diagnosed with     Panic attack   [851938]       Hyponatremia   [318545]       Hyperkalemia   [628701]       Chronic use of opiate drugs therapeutic purposes   [3466372]       Pain, joint, multiple sites   [856773]         Vital Signs     Blood Pressure Pulse Temperature Respirations Height Weight    124/68 mmHg 66 37 °C (98.6 °F) 16 1.575 m (5' 2.01\") 67.132 kg (148 lb)    Body Mass Index Oxygen Saturation Smoking Status             27.06 kg/m2 98% Former Smoker         Basic Information     Date Of Birth Sex Race Ethnicity Preferred Language    1934 Female White Non- English      Your appointments     Oct 16, 2017  1:00 PM   Established Patient with DU Guardado   [facility]    [address] [place] [id number] [phone]           You will be receiving a confirmation call a few days before your appointment from our automated call confirmation system.              Problem List              ICD-10-CM Priority Class Noted - Resolved    Glaucoma H40.9   2015 - Present    Hypertension I10   2015 - Present    Hypothyroidism E03.9   2015 - Present    HSV (herpes simplex virus) infection B00.9   2015 - Present    Osteoarthritis M19.90   2015 - Present    Fall W19.XXXA   3/9/2015 - Present    Hyperlipidemia E78.5   2015 - Present    Renal insufficiency N28.9   2015 - Present    CKD (chronic kidney disease) N18.9   2016 - Present    Vitamin D deficiency disease E55.9   2016 - Present    Post-nasal drip R09.82   2016 - Present    Fatigue due to depression F32.9, " R53.83   3/19/2016 - Present    CLL (chronic lymphocytic leukemia) (CMS-[facility]) C91.10   3/22/2016 - Present    Left knee pain M25.562   3/29/2016 - Present    Chronic fatigue R53.82   3/29/2016 - Present    Chronic back pain M54.9, G89.29   8/18/2016 - Present    Anxiety F41.9   6/28/2017 - Present    Chronic right shoulder pain M25.511, G89.29   6/28/2017 - Present    Bilateral hand pain M79.641, M79.642   6/28/2017 - Present    Panic attack F41.0   7/14/2017 - Present    Chronic use of opiate drugs therapeutic purposes Z79.891   7/14/2017 - Present      Health Maintenance        Date Due Completion Dates    IMM DTaP/Tdap/Td Vaccine (1 - Tdap) 11/19/1953 ---    IMM ZOSTER VACCINE 11/19/1994 ---    BONE DENSITY 11/19/1999 ---    IMM PNEUMOCOCCAL 65+ (ADULT) HIGH/HIGHEST RISK SERIES (1 of 2 - PCV13) 11/19/1999 ---    IMM INFLUENZA (1) 9/1/2017 ---            Current Immunizations     No immunizations on file.      Below and/or attached are the medications your provider expects you to take. Review all of your home medications and newly ordered medications with your provider and/or pharmacist. Follow medication instructions as directed by your provider and/or pharmacist. Please keep your medication list with you and share with your provider. Update the information when medications are discontinued, doses are changed, or new medications (including over-the-counter products) are added; and carry medication information at all times in the event of emergency situations     Allergies:  No Known Allergies          Medications  Valid as of: July 14, 2017 -  1:50 PM    Generic Name Brand Name Tablet Size Instructions for use    Acetaminophen (Tab) TYLENOL 500 MG Take 1-2 Tabs by mouth 3 times a day as needed for Moderate Pain.        Acyclovir (Ointment) ZOVIRAX 5 % Apply 1 Application to affected area(s) every 3 hours.        Cholecalciferol (Tab) cholecalciferol 1000 UNIT Take 4,000 Units by mouth every day.         Cyanocobalamin   Take  by mouth.        CycloSPORINE (Emulsion) RESTASIS 0.05 % Place 1 Drop in both eyes 2 times a day.        Diclofenac Sodium (Gel) Diclofenac Sodium 1 % Apply to 2-4 gram to affected area 4 times daily        Ergocalciferol (Cap) DRISDOL 13947 UNIT Take 1 Cap by mouth every 7 days.        Escitalopram Oxalate (Tab) LEXAPRO 10 MG Take 1 Tab by mouth every day.        Hydrocodone-Acetaminophen (Tab) NORCO 5-325 MG Take 1 Tab by mouth 2 times a day as needed (severe pain).        HydrOXYzine HCl (Tab) ATARAX 25 MG 1/2 to 1 pill 3 times a day as needed        Levothyroxine Sodium (Tab) SYNTHROID 50 MCG Take 1 Tab by mouth every day.        Lisinopril (Tab) PRINIVIL 20 MG Take 1 Tab by mouth every day.        Lysine HCl (Tab) lysine 500 MG Take 500 mg by mouth every day.        Oxycodone-Acetaminophen (Tab) PERCOCET 5-325 MG Take 1 Tab by mouth 1 time daily as needed for Moderate Pain.        Timolol Maleate (Solution) TIMOPTIC 0.5 %         Travoprost (Solution) TRAVATAN Z 0.004 %         .                 Medicines prescribed today were sent to:     [facility] - [address] [address]    Phone: [phone] Fax: [phone]    Open 24 Hours?: No    [id number] - [facility] [address] AT [facility] OF On license of [facility] [address] [address]    Phone: [phone] Fax: [phone]    Open 24 Hours?: No      Medication refill instructions:       If your prescription bottle indicates you have medication refills left, it is not necessary to call your provider’s office. Please contact your pharmacy and they will refill your medication.    If your prescription bottle indicates you do not have any refills left, you may request refills at any time through one of the following ways: The online [employer] system (except Urgent Care), by calling your provider’s office, or by asking your pharmacy to contact your  provider’s office with a refill request. Medication refills are processed only during regular business hours and may not be available until the next business day. Your provider may request additional information or to have a follow-up visit with you prior to refilling your medication.   *Please Note: Medication refills are assigned a new Rx number when refilled electronically. Your pharmacy may indicate that no refills were authorized even though a new prescription for the same medication is available at the pharmacy. Please request the medicine by name with the pharmacy before contacting your provider for a refill.        Your To Do List     Future Labs/Procedures Complete By [employer]    BASIC METABOLIC PANEL  As directed 7/14/2018    [facility] PAIN MANAGEMENT SCREEN  As directed 7/14/2018    Comments:    Current Meds (name, sig, last dose):   Current outpatient prescriptions:   •  acyclovir, 1 Application, Topical, Q3HRS, prn  •  escitalopram, 10 mg, Oral, DAILY  •  oxycodone-acetaminophen, 1 Tab, Oral, Q6HRS PRN  •  timolol,   •  hydrocodone-acetaminophen, 1 Tab, Oral, BID PRN, prn  •  cyclosporin, 1 Drop, Both Eyes, BID  •  acetaminophen, 500-1,000 mg, Oral, TID PRN, PRN  •  levothyroxine, 50 mcg, Oral, DAILY  •  lisinopril, 20 mg, Oral, DAILY  •  ergocalciferol, 50,000 Units, Oral, Q7 DAYS  •  Cyanocobalamin (VITAMIN B 12 PO), Take  by mouth.  •  vitamin D, 4,000 Units, Oral, DAILY  •  lysine, 500 mg, Oral, DAILY  •  TRAVATAN Z,   •  Diclofenac Sodium, Apply to 2-4 gram to affected area 4 times daily, not taking            Instructions    Continue on the Lexapro, 10 mg daily    Do not take the Xanax, will prescribe something else for anxiety to take as needed--Atarax, 25 mg, 1/2 to 1 pill up to 3 times a day as needed for anxiety    Watch water intake to 48-60 ounce a day    Follow up with me in 3 months, sooner in needed    Will initiate pain  Contract and get urine for UDS    Repeat labs in 2 weeks           MyChart Status: Patient Declined

## 2017-07-14 NOTE — PATIENT INSTRUCTIONS
Continue on the Lexapro, 10 mg daily    Do not take the Xanax, will prescribe something else for anxiety to take as needed--Atarax, 25 mg, 1/2 to 1 pill up to 3 times a day as needed for anxiety    Watch water intake to 48-60 ounce a day    Follow up with me in 3 months, sooner in needed    Will initiate pain  Contract and get urine for UDS    Repeat labs in 2 weeks

## 2017-07-14 NOTE — ASSESSMENT & PLAN NOTE
Chronic pain recheck: Patient here to initiate pain management and controlled substance agreement for chronic back pain associated with osteoarthritis that affects multiple joints. She has chronic pain in her shoulders, hands and pain in her bilateral hands. Patient also has significant anxiety and depression which I believe exacerbates her pain. She has previously been prescribed Percocet 5-3 25 mg one pill that she takes very sporadically, not even on a daily basis. In fact she reports that 30-45 pills will last her at least 3 months. That is the regimen we will start her on today. I explained to her the parameters of the contract, and reminded her that there would be no early refills and that we will make med adjustments in 3 months. She verbalized understanding.  It should be noted that she recently has been to the emergency room twice for increased anxiety and panic attacks in which she has been given 2 separate prescriptions for Xanax. I had long jay jay discussion with her that if she is going to takes Xanax, she cannot be prescribed Percocet. She states that she is not taking the Xanax , but wantsanything she can take for her anxiety as needed in addition to the Lexapro, I discussed with her that there are other medications we can try (see additional notes).  I discussed with patient the risks, benefits and side effects of these medications. Patient was reminded that there would be no early refills and that lost and stolen prescriptions would not be replaced. I counseled patient on the fact that there is no clear evidence that ongoing opioid therapy is beneficial for chronic pain. Patient was instructed to keep pain medications safe and locked up if necessary. Patient was given one prescription for 45 pills for a 3 month supply to take to pharmacy right away. Patient is to take this exactly as prescribed and was advised not to drink alcohol or use illicit substances. She will sign a new contract today and  submit urine for drug screen. I will anticipate her [facility] drug screen to be negative as she has not taken pain pills in several days.    Last dose of controlled substance: Several days  Chronic pain treated with Percocet, 5-3 25 mg one pill taken once a day, and not every day    She  reports that she does not drink alcohol.  She  reports that she does not use illicit drugs.    Consequences of Chronic Opiate therapy:  (5 A's)  Analgesia: Compared to no treatment or prior treatment, pain is currently improved  Activity: improved  Adverse Events: She denies constipation, dry mouth, itchy skin, nausea and sedation  Aberrant Behaviors: She reports she is taking medication as prescribed and is not veering from agreed treatment regimen. There have been no inappropriate refills or lost/stolen meds reported.   Affect/Mood: Pain sometimes is impacting patient's mood.  Patient reports depression/anxiety.    Nonnarcotic treatments that are being used: NSAIDs/ACEVEDO-2 and SSRI/SNRI.   Treatment goals discussed.    Opioid Risk Score: 5    Interpretation of Opioid Risk Score   Score 0-3 = Low risk of abuse. Do UDS at least once per year.  Score 4-7 = Moderate risk of abuse. Do UDS 1-4 times per year.  Score 8+ = High risk of abuse. Refer to specialist.    Last order of CONTROLLED SUBSTANCE TREATMENT AGREEMENT was found on 7/14/2017 from Office Visit on 7/14/2017     UDS Summary     Patient has no health maintenance due at this time        There is no UDS to review, as she is submitting urine today for her first urine drug screen to initiate contract.    I have reviewed the medical records, the Prescription Monitoring Program and I have determined that controlled substance treatment is medically indicated.

## 2017-07-15 NOTE — PROGRESS NOTES
Chief Complaint   Patient presents with   • Hospital Follow-up     fv [facility]         This is a 82 y.o.female patient that presents today with the following:follow-up visit, status post either visit panic attack    Panic attack  Patient had a panic attack on Saturday the 8th of July and was seen in [facility]--she was admitted for 2 days. During her treatment she reports that she received IV fluids. She was told that she had low sodium level and a high potassium level, will be still have not received those notes--thus I'm not sure her levels were. We have requested notes from [facility]. She was told upon discharge to decrease her fluid intake and to watch her diet and avoid foods high in potassium. She does not take diuretics and she reports to me that she does not eat foods high in potassium. We will recheck labs in a week or so she has decreased her fluid intake. I encouraged her to taken no more than 48-60 ounces per day.  She is to continue on her Lexapro, 10 mg daily. She states this is starting to really help with her anxiety and depression. She was strongly encouraged not to take the Xanax as she is now on a controlled substance agreement with me and she's been prescribed Percocet. I do not have a concern about weaning her off of this as she has only received recently 2 small prescriptions from her last 2 ER visits within a week of each other in which she received a prescription for 5 pills on one occasion, and then 12 pills on the other. I will have her try hydroxyzine, 25 mg one half to one full pill up to 3 times a day as needed for breakthrough anxiety and panic attack. Patient verbalized understanding and was agreeable to this plan. I will see her back in 3 months, sooner if needed.    Chronic use of opiate drugs therapeutic purposes  Chronic pain recheck: Patient here to initiate pain management and controlled substance agreement for chronic back pain associated with osteoarthritis that  affects multiple joints. She has chronic pain in her shoulders, hands and pain in her bilateral hands. Patient also has significant anxiety and depression which I believe exacerbates her pain. She has previously been prescribed Percocet 5-3 25 mg one pill that she takes very sporadically, not even on a daily basis. In fact she reports that 30-45 pills will last her at least 3 months. That is the regimen we will start her on today. I explained to her the parameters of the contract, and reminded her that there would be no early refills and that we will make med adjustments in 3 months. She verbalized understanding.  It should be noted that she recently has been to the emergency room twice for increased anxiety and panic attacks in which she has been given 2 separate prescriptions for Xanax. I had long jay jay discussion with her that if she is going to takes Xanax, she cannot be prescribed Percocet. She states that she is not taking the Xanax , but wantsanything she can take for her anxiety as needed in addition to the Lexapro, I discussed with her that there are other medications we can try (see additional notes).  I discussed with patient the risks, benefits and side effects of these medications. Patient was reminded that there would be no early refills and that lost and stolen prescriptions would not be replaced. I counseled patient on the fact that there is no clear evidence that ongoing opioid therapy is beneficial for chronic pain. Patient was instructed to keep pain medications safe and locked up if necessary. Patient was given one prescription for 45 pills for a 3 month supply to take to pharmacy right away. Patient is to take this exactly as prescribed and was advised not to drink alcohol or use illicit substances. She will sign a new contract today and submit urine for drug screen. I will anticipate her [facility] drug screen to be negative as she has not taken pain pills in several days.    Last dose of  controlled substance: Several days  Chronic pain treated with Percocet, 5-3 25 mg one pill taken once a day, and not every day    She  reports that she does not drink alcohol.  She  reports that she does not use illicit drugs.    Consequences of Chronic Opiate therapy:  (5 A's)  Analgesia: Compared to no treatment or prior treatment, pain is currently improved  Activity: improved  Adverse Events: She denies constipation, dry mouth, itchy skin, nausea and sedation  Aberrant Behaviors: She reports she is taking medication as prescribed and is not veering from agreed treatment regimen. There have been no inappropriate refills or lost/stolen meds reported.   Affect/Mood: Pain sometimes is impacting patient's mood.  Patient reports depression/anxiety.    Nonnarcotic treatments that are being used: NSAIDs/ACEVEDO-2 and SSRI/SNRI.   Treatment goals discussed.    Opioid Risk Score: 5    Interpretation of Opioid Risk Score   Score 0-3 = Low risk of abuse. Do UDS at least once per year.  Score 4-7 = Moderate risk of abuse. Do UDS 1-4 times per year.  Score 8+ = High risk of abuse. Refer to specialist.    Last order of CONTROLLED SUBSTANCE TREATMENT AGREEMENT was found on 7/14/2017 from Office Visit on 7/14/2017     UDS Summary     Patient has no health maintenance due at this time        There is no UDS to review, as she is submitting urine today for her first urine drug screen to initiate contract.    I have reviewed the medical records, the Prescription Monitoring Program and I have determined that controlled substance treatment is medically indicated.            Admission on 06/22/2017, Discharged on 06/22/2017   Component Date Value   • Sodium 06/22/2017 128*   • Potassium 06/22/2017 5.2    • Chloride 06/22/2017 99    • Co2 06/22/2017 20    • Glucose 06/22/2017 90    • Bun 06/22/2017 35*   • Creatinine 06/22/2017 1.39    • Calcium 06/22/2017 9.6    • Anion Gap 06/22/2017 9.0    • Color 06/22/2017 Colorless    • Character  06/22/2017 Clear    • Specific Gravity 06/22/2017 1.003    • Ph 06/22/2017 5.0    • Glucose 06/22/2017 Negative    • Ketones 06/22/2017 Negative    • Protein 06/22/2017 Negative    • Bilirubin 06/22/2017 Negative    • Nitrite 06/22/2017 Negative    • Leukocyte Esterase 06/22/2017 Negative    • Occult Blood 06/22/2017 Negative    • Micro Urine Req 06/22/2017 see below    • Culture Indicated 06/22/2017 No    • TSH 06/22/2017 1.950    • Free T-4 06/22/2017 1.18    • GFR If  06/22/2017 44*   • GFR If Non  [facility]* 06/22/2017 36*   Hospital Outpatient Visit on 06/21/2017   Component Date Value   • 25-Hydroxy   Vitamin D 25 06/21/2017 36    • TSH 06/21/2017 1.600    • Free T-4 06/21/2017 1.22    Hospital Outpatient Visit on 06/21/2017   Component Date Value   • WBC 06/21/2017 31.3*   • RBC 06/21/2017 4.70    • Hemoglobin 06/21/2017 14.0    • Hematocrit 06/21/2017 43.9    • MCV 06/21/2017 93.4    • MCH 06/21/2017 29.8    • MCHC 06/21/2017 31.9*   • RDW 06/21/2017 46.0    • Platelet Count 06/21/2017 352    • MPV 06/21/2017 12.0    • Nucleated RBC 06/21/2017 0.00    • NRBC (Absolute) 06/21/2017 0.00    • Neutrophils-Polys 06/21/2017 27.80*   • Lymphocytes 06/21/2017 68.50*   • Monocytes 06/21/2017 1.90    • Eosinophils 06/21/2017 0.90    • Basophils 06/21/2017 0.00    • Neutrophils (Absolute) 06/21/2017 8.98*   • Lymphs (Absolute) 06/21/2017 21.44*   • Monos (Absolute) 06/21/2017 0.59    • Eos (Absolute) 06/21/2017 0.28    • Baso (Absolute) 06/21/2017 0.00    • Anisocytosis 06/21/2017 1+    • Sodium 06/21/2017 128*   • Potassium 06/21/2017 5.6*   • Chloride 06/21/2017 98    • Co2 06/21/2017 23    • Anion Gap 06/21/2017 7.0    • Glucose 06/21/2017 89    • Bun 06/21/2017 31*   • Creatinine 06/21/2017 1.59*   • Calcium 06/21/2017 9.6    • AST(SGOT) 06/21/2017 15    • ALT(SGPT) 06/21/2017 9    • Alkaline Phosphatase 06/21/2017 53    • Total Bilirubin 06/21/2017 0.5    • Albumin 06/21/2017 3.7    • Total  Protein 06/21/2017 7.6    • Globulin 06/21/2017 3.9*   • A-G Ratio 06/21/2017 0.9    • GFR If  06/21/2017 38*   • GFR If Non  [facility]* 06/21/2017 31*   • RBC Morphology 06/21/2017 Present    • Smudge Cells 06/21/2017 Moderate    • Reactive Lymphocytes 06/21/2017 Few    • Peripheral Smear Review 06/21/2017 see below    • Bands-Stabs 06/21/2017 0.90    • Manual Diff Status 06/21/2017 PERFORMED    • Plt Estimation 06/21/2017 Normal    Office Visit on 06/16/2017   Component Date Value   • Glucose - Accu-Ck 06/16/2017 86    • POC Color 06/16/2017 yellow    • POC Appearance 06/16/2017 clear    • POC Leukocyte Esterase 06/16/2017 moderate    • POC Nitrites 06/16/2017 neg    • POC Urobiligen 06/16/2017 neg    • POC Protein 06/16/2017 neg    • POC Urine PH 06/16/2017 5    • POC Blood 06/16/2017 moderate    • POC Specific Gravity 06/16/2017 1.005    • POC Ketones 06/16/2017 5    • POC Biliruben 06/16/2017 neg    • POC Glucose 06/16/2017 neg          clinical course has been stable    Past Medical History   Diagnosis Date   • H/O scarlet fever      age 10   • Hypertension    • Hyperlipidemia    • HSV-1 infection    • HSV-2 infection    • CLL (chronic lymphocytic leukemia) (CMS-HCC) 3/22/2016       Past Surgical History   Procedure Laterality Date   • Tonsillectomy     • Abdominal hysterectomy total     • Appendectomy     • Open reduction     • Knee arthroplasty total       right       Family History   Problem Relation Age of Onset   • Heart Disease Father    • Heart Attack Father    • Alcohol/Drug Father    • Cancer Sister      skin, not melanoma   • Diabetes Neg Hx    • Stroke Neg Hx        Review of patient's allergies indicates no known allergies.    Current Outpatient Prescriptions Ordered in [facility]   Medication Sig Dispense Refill   • oxycodone-acetaminophen (PERCOCET) 5-325 MG Tab Take 1 Tab by mouth 1 time daily as needed for Moderate Pain. 45 Tab 0   • hydrOXYzine (ATARAX) 25 MG Tab 1/2 to 1 pill 3  times a day as needed 60 Tab 0   • acyclovir (ZOVIRAX) 5 % Ointment Apply 1 Application to affected area(s) every 3 hours. 1 Tube 6   • escitalopram (LEXAPRO) 10 MG Tab Take 1 Tab by mouth every day. 90 Tab 1   • timolol (TIMOPTIC) 0.5 % Solution      • hydrocodone-acetaminophen (NORCO) 5-325 MG Tab per tablet Take 1 Tab by mouth 2 times a day as needed (severe pain). 60 Tab 0   • cyclosporin (RESTASIS) 0.05 % ophthalmic emulsion Place 1 Drop in both eyes 2 times a day.     • acetaminophen (TYLENOL) 500 MG Tab Take 1-2 Tabs by mouth 3 times a day as needed for Moderate Pain. 90 Tab 1   • levothyroxine (SYNTHROID) 50 MCG Tab Take 1 Tab by mouth every day. 90 Tab 3   • lisinopril (PRINIVIL) 20 MG Tab Take 1 Tab by mouth every day. 90 Tab 3   • ergocalciferol (DRISDOL) 87488 UNIT capsule Take 1 Cap by mouth every 7 days. 12 Cap 0   • Cyanocobalamin (VITAMIN B 12 PO) Take  by mouth.     • vitamin D (CHOLECALCIFEROL) 1000 UNIT Tab Take 4,000 Units by mouth every day.     • lysine 500 MG TABS Take 500 mg by mouth every day.     • TRAVATAN Z 0.004 % SOLN      • Diclofenac Sodium 1 % Gel Apply to 2-4 gram to affected area 4 times daily 100 g 2     No current Epic-ordered facility-administered medications on file.       Constitutional ROS: No unexpected change in weight, No weakness, No unexplained fevers, sweats, or chills  Pulmonary ROS: No chronic cough, sputum, or hemoptysis, No shortness of breath, No recent change in breathing  Cardiovascular ROS: No chest pain, No edema, No palpitations  Gastrointestinal ROS: No abdominal pain, No nausea, vomiting, diarrhea, or constipation, no blood in stool  Musculoskeletal/Extremities ROS: positive for osteoarthritis of multiple joints, per HPI  Hematologic/Lymphatic ROS: positive for history of CLL, followed by oncology  Neurologic ROS: Normal development, No seizures, No weakness  Psychiatric ROS: positive for anxiety with panic attack, per HPI    Physical exam:  /68 mmHg   "Pulse 66  Temp(Src) 37 °C (98.6 °F)  Resp 16  Ht 1.575 m (5' 2.01\")  Wt 67.132 kg (148 lb)  BMI 27.06 kg/m2  SpO2 98%  General Appearance: elderly female, alert, no distress, moderately overweight, well groomed  Skin: Skin color, texture, turgor normal. No rashes or lesions.  Lungs: negative findings: normal respiratory rate and rhythm, lungs clear to auscultation  Heart: negative. RRR without murmur, gallop, or rubs.  No ectopy.  Abdomen: Abdomen soft, non-tender. BS normal. No masses,  No organomegaly  Musculoskeletal: positive findings: decreased range of motion to lumbar spine, bilateral shoulders and multiple joints and bilateral hands due to pain  Neurologic: intact, oriented, mood appropriate, judgment intact. Cranial nerves two through 12 grossly intact    Medical decision making/discussion: elderly female presenting today to initiate pain management and controlled substance agreement. She submitted urine for drug screen and signed contract. She will be given prescription for 45 Percocet to last her for the next three months and we will adjust dosing at her follow-up appointment. She understands the parameters of her contract.. She was reminded that there would be no early refills, and that lost and stolen prescriptions will not be replaced. We went over risks, benefits and side effects of the medications she is not to drink alcohol or take any other illicit substances while on these medications. She was strongly advised not to take Xanax while she is on this medication. For her breakthrough anxiety we will switch her to hydroxyzine, 25 mg one half pill to one full pill up to three times a day as needed--she will continue on the Lexapro daily. She is going to follow up with me three months, sooner if needed. With regards to her lab abnormalities while in the hospital last weekend, we will repeat those in two weeks after she has decreased her water intake. I will notify her of result in further actions " if needed.    JITENDRA was seen today for hospital follow-up.    Diagnoses and all orders for this visit:    Panic attack  -     hydrOXYzine (ATARAX) 25 MG Tab; 1/2 to 1 pill 3 times a day as needed    Hyponatremia  -     BASIC METABOLIC PANEL; Future    Hyperkalemia  -     BASIC METABOLIC PANEL; Future    Chronic use of opiate drugs therapeutic purposes  -     CONTROLLED SUBSTANCE TREATMENT AGREEMENT  -     [employer] PAIN MANAGEMENT SCREEN; Future    Pain, joint, multiple sites  -     oxycodone-acetaminophen (PERCOCET) 5-325 MG Tab; Take 1 Tab by mouth 1 time daily as needed for Moderate Pain.          Please note that this dictation was created using voice recognition software. I have made every reasonable attempt to correct obvious errors, but I expect that there are errors of grammar and possibly content that I did not discover before finalizing the note.

## 2017-07-28 ENCOUNTER — HOSPITAL ENCOUNTER (OUTPATIENT)
Dept: LAB | Facility: MEDICAL CENTER | Age: 82
End: 2017-07-28
Attending: NURSE PRACTITIONER
Payer: MEDICARE

## 2017-07-28 DIAGNOSIS — E87.5 HYPERKALEMIA: ICD-10-CM

## 2017-07-28 DIAGNOSIS — E87.1 HYPONATREMIA: ICD-10-CM

## 2017-07-28 LAB
ANION GAP SERPL CALC-SCNC: 6 MMOL/L (ref 0–11.9)
BUN SERPL-MCNC: 37 MG/DL (ref 8–22)
CALCIUM SERPL-MCNC: 9.6 MG/DL (ref 8.5–10.5)
CHLORIDE SERPL-SCNC: 98 MMOL/L (ref 96–112)
CO2 SERPL-SCNC: 27 MMOL/L (ref 20–33)
CREAT SERPL-MCNC: 1.2 MG/DL (ref 0.5–1.4)
GFR SERPL CREATININE-BSD FRML MDRD: 43 ML/MIN/1.73 M 2
GLUCOSE SERPL-MCNC: 84 MG/DL (ref 65–99)
POTASSIUM SERPL-SCNC: 5.4 MMOL/L (ref 3.6–5.5)
SODIUM SERPL-SCNC: 131 MMOL/L (ref 135–145)

## 2017-07-28 PROCEDURE — 80048 BASIC METABOLIC PNL TOTAL CA: CPT

## 2017-07-28 PROCEDURE — 36415 COLL VENOUS BLD VENIPUNCTURE: CPT

## 2017-07-29 DIAGNOSIS — E87.1 HYPONATREMIA: ICD-10-CM

## 2017-08-01 ENCOUNTER — TELEPHONE (OUTPATIENT)
Dept: MEDICAL GROUP | Facility: PHYSICIAN GROUP | Age: 82
End: 2017-08-01

## 2017-08-01 NOTE — TELEPHONE ENCOUNTER
Results and recommendations relayed to pt.  She acknowledged, repeated and agreed.  She said thank you.

## 2017-08-01 NOTE — TELEPHONE ENCOUNTER
----- Message from DU Guardado sent at 7/29/2017  8:06 AM PDT -----  Please let pt know that sodium level is improving, but she needs to continue with fluid restriction as discussed at last visit. Will recheck this before her follow up appt with me

## 2017-08-08 DIAGNOSIS — I10 ESSENTIAL HYPERTENSION: ICD-10-CM

## 2017-08-09 RX ORDER — LISINOPRIL 20 MG/1
20 TABLET ORAL DAILY
Qty: 90 TAB | Refills: 1 | Status: SHIPPED | OUTPATIENT
Start: 2017-08-09 | End: 2018-02-06 | Stop reason: SDUPTHER

## 2017-08-09 NOTE — TELEPHONE ENCOUNTER
Refill X 6 months, sent to pharmacy.Pt. Seen in the last 6 months per protocol.   Lab Results   Component Value Date/Time    SODIUM 131* 07/28/2017 10:28 AM    POTASSIUM 5.4 07/28/2017 10:28 AM    CHLORIDE 98 07/28/2017 10:28 AM    CO2 27 07/28/2017 10:28 AM    GLUCOSE 84 07/28/2017 10:28 AM    BUN 37* 07/28/2017 10:28 AM    CREATININE 1.20 07/28/2017 10:28 AM

## 2017-08-09 NOTE — TELEPHONE ENCOUNTER
Was the patient seen in the last year in this department? Yes     Does patient have an active prescription for medications requested? No     Received Request Via: Pharmacy      Pt met protocol?: Yes    LAST OV 07/14/2017    BP Readings from Last 1 Encounters:   07/14/17 124/68

## 2017-09-07 RX ORDER — LEVOTHYROXINE SODIUM 0.05 MG/1
50 TABLET ORAL DAILY
Qty: 90 TAB | Refills: 1 | Status: SHIPPED | OUTPATIENT
Start: 2017-09-07 | End: 2018-02-10 | Stop reason: SDUPTHER

## 2017-10-09 DIAGNOSIS — M15.9 PRIMARY OSTEOARTHRITIS INVOLVING MULTIPLE JOINTS: ICD-10-CM

## 2017-10-09 RX ORDER — DICLOFENAC SODIUM 75 MG/1
75 TABLET, DELAYED RELEASE ORAL 2 TIMES DAILY
Qty: 60 TAB | Refills: 3 | Status: CANCELLED | OUTPATIENT
Start: 2017-10-09

## 2017-10-11 NOTE — TELEPHONE ENCOUNTER
Phone Number Called: [phone] (home)     Message: LVM for pt. To return call.    Left Message for patient to call back: N\A

## 2017-10-16 ENCOUNTER — OFFICE VISIT (OUTPATIENT)
Dept: MEDICAL GROUP | Facility: PHYSICIAN GROUP | Age: 82
End: 2017-10-16
Payer: MEDICARE

## 2017-10-16 VITALS
TEMPERATURE: 97.8 F | DIASTOLIC BLOOD PRESSURE: 64 MMHG | OXYGEN SATURATION: 91 % | WEIGHT: 141 LBS | HEART RATE: 50 BPM | BODY MASS INDEX: 25.95 KG/M2 | SYSTOLIC BLOOD PRESSURE: 118 MMHG | HEIGHT: 62 IN | RESPIRATION RATE: 16 BRPM

## 2017-10-16 DIAGNOSIS — M79.641 BILATERAL HAND PAIN: ICD-10-CM

## 2017-10-16 DIAGNOSIS — M15.9 PRIMARY OSTEOARTHRITIS INVOLVING MULTIPLE JOINTS: ICD-10-CM

## 2017-10-16 DIAGNOSIS — F41.9 ANXIETY: ICD-10-CM

## 2017-10-16 DIAGNOSIS — K05.10 GUM INFLAMMATION: ICD-10-CM

## 2017-10-16 DIAGNOSIS — M79.641 CHRONIC HAND PAIN, RIGHT: ICD-10-CM

## 2017-10-16 DIAGNOSIS — G89.29 CHRONIC HAND PAIN, RIGHT: ICD-10-CM

## 2017-10-16 DIAGNOSIS — M79.642 BILATERAL HAND PAIN: ICD-10-CM

## 2017-10-16 DIAGNOSIS — G89.29 CHRONIC BILATERAL LOW BACK PAIN WITHOUT SCIATICA: ICD-10-CM

## 2017-10-16 DIAGNOSIS — M54.50 CHRONIC BILATERAL LOW BACK PAIN WITHOUT SCIATICA: ICD-10-CM

## 2017-10-16 PROCEDURE — 99214 OFFICE O/P EST MOD 30 MIN: CPT | Performed by: NURSE PRACTITIONER

## 2017-10-16 RX ORDER — DICLOFENAC SODIUM 75 MG/1
75 TABLET, DELAYED RELEASE ORAL 2 TIMES DAILY
Qty: 60 TAB | Refills: 3 | Status: SHIPPED | OUTPATIENT
Start: 2017-10-16 | End: 2018-02-10 | Stop reason: SDUPTHER

## 2017-10-16 RX ORDER — DICLOFENAC SODIUM 75 MG/1
TABLET, DELAYED RELEASE ORAL
COMMUNITY
Start: 2017-09-05 | End: 2017-10-16

## 2017-10-16 NOTE — ASSESSMENT & PLAN NOTE
"This is a chronic condition, stable and fairly well controlled on Lexapro on the 10 mg daily. This is started back in late June 2017 in which she was having a \"difficult summer.\" She states that her symptoms have improved and she is tolerating medication well with no significant bothersome side effects. She does not need refills at this time, but can call as needed. She does continue to deny suicidal and homicidal ideations, hallucinations, racing thoughts and flights of ideas.  "

## 2017-10-16 NOTE — PATIENT INSTRUCTIONS
Ok to take Tylenol 500 mg at bedtime    Voltaren 75 mg has been called in    Follow up with me in January with labs before visit    See dentist about lump in mouth

## 2017-10-16 NOTE — ASSESSMENT & PLAN NOTE
This is a chronic condition, stable at this time. She had previously seen physical therapy, states that her symptoms have improved with this. She does however continue to have chronic pain, especially in the right is requesting to see hand specialist as she has now started feeling a lump in the palm of her right hand. Referral has been placed.

## 2017-10-16 NOTE — ASSESSMENT & PLAN NOTE
Patient has noticed over the last month or 2 a firm, bone-like growth the lower outer gumline, just right of midline. There is no obvious erythema or drainage, but a bony mass can be felt. She does wear daily dentures that she states it rubs against this lump causes irritation to it. I did strongly encourage her to make an appointment with dentist to have this properly assessed.

## 2017-10-16 NOTE — ASSESSMENT & PLAN NOTE
Patient has osteoarthritis is stable at this time with sporadic use the Percocet and physical therapy. She is inquiring about taking extra strength Tylenol at bedtime to help her sleep but is concerned about concurrent use of Percocet. She reports to me that she only takes this very sparingly and not even every day. I told her that it will be okay for her to take extra strength Tylenol at bedtime if this helps her sleep through the night.

## 2017-10-16 NOTE — PROGRESS NOTES
"Chief Complaint   Patient presents with   • Pain     med refill         This is a 82 y.o.female patient that presents today with the following:Follow-up visit, discuss acute and chronic conditions, medication refills    Anxiety  This is a chronic condition, stable and fairly well controlled on Lexapro on the 10 mg daily. This is started back in late June 2017 in which she was having a \"difficult summer.\" She states that her symptoms have improved and she is tolerating medication well with no significant bothersome side effects. She does not need refills at this time, but can call as needed. She does continue to deny suicidal and homicidal ideations, hallucinations, racing thoughts and flights of ideas.    Bilateral hand pain  This is a chronic condition, stable at this time. She had previously seen physical therapy, states that her symptoms have improved with this. She does however continue to have chronic pain, especially in the right is requesting to see hand specialist as she has now started feeling a lump in the palm of her right hand. Referral has been placed.    Osteoarthritis  Patient has osteoarthritis is stable at this time with sporadic use the Percocet and physical therapy. She is inquiring about taking extra strength Tylenol at bedtime to help her sleep but is concerned about concurrent use of Percocet. She reports to me that she only takes this very sparingly and not even every day. I told her that it will be okay for her to take extra strength Tylenol at bedtime if this helps her sleep through the night.    Gum inflammation  Patient has noticed over the last month or 2 a firm, bone-like growth the lower outer gumline, just right of midline. There is no obvious erythema or drainage, but a bony mass can be felt. She does wear daily dentures that she states it rubs against this lump causes irritation to it. I did strongly encourage her to make an appointment with dentist to have this properly " assessed.      No visits with results within 1 Month(s) from this visit.   Latest known visit with results is:   Hospital Outpatient Visit on 07/28/2017   Component Date Value   • Sodium 07/28/2017 131*   • Potassium 07/28/2017 5.4    • Chloride 07/28/2017 98    • Co2 07/28/2017 27    • Glucose 07/28/2017 84    • Bun 07/28/2017 37*   • Creatinine 07/28/2017 1.20    • Calcium 07/28/2017 9.6    • Anion Gap 07/28/2017 6.0    • GFR If  07/28/2017 52*   • GFR If Non  Ameri* 07/28/2017 43*         clinical course has been stable    Past Medical History:   Diagnosis Date   • CLL (chronic lymphocytic leukemia) (CMS-HCC) 3/22/2016   • H/O scarlet fever     age 10   • HSV-1 infection    • HSV-2 infection    • Hyperlipidemia    • Hypertension        Past Surgical History:   Procedure Laterality Date   • ABDOMINAL HYSTERECTOMY TOTAL     • APPENDECTOMY     • KNEE ARTHROPLASTY TOTAL      right   • OPEN REDUCTION     • TONSILLECTOMY         Family History   Problem Relation Age of Onset   • Heart Disease Father    • Heart Attack Father    • Alcohol/Drug Father    • Cancer Sister      skin, not melanoma   • Diabetes Neg Hx    • Stroke Neg Hx        Review of patient's allergies indicates no known allergies.    Current Outpatient Prescriptions Ordered in [facility]   Medication Sig Dispense Refill   • diclofenac EC (VOLTAREN) 75 MG Tablet Delayed Response Take 1 Tab by mouth 2 times a day. 60 Tab 3   • levothyroxine (SYNTHROID) 50 MCG Tab Take 1 Tab by mouth every day. 90 Tab 1   • lisinopril (PRINIVIL) 20 MG Tab Take 1 Tab by mouth every day. 90 Tab 1   • oxycodone-acetaminophen (PERCOCET) 5-325 MG Tab Take 1 Tab by mouth 1 time daily as needed for Moderate Pain. 45 Tab 0   • hydrOXYzine (ATARAX) 25 MG Tab 1/2 to 1 pill 3 times a day as needed 60 Tab 0   • acyclovir (ZOVIRAX) 5 % Ointment Apply 1 Application to affected area(s) every 3 hours. 1 Tube 6   • escitalopram (LEXAPRO) 10 MG Tab Take 1 Tab by mouth every  "day. 90 Tab 1   • timolol (TIMOPTIC) 0.5 % Solution      • cyclosporin (RESTASIS) 0.05 % ophthalmic emulsion Place 1 Drop in both eyes 2 times a day.     • acetaminophen (TYLENOL) 500 MG Tab Take 1-2 Tabs by mouth 3 times a day as needed for Moderate Pain. 90 Tab 1   • ergocalciferol (DRISDOL) 64577 UNIT capsule Take 1 Cap by mouth every 7 days. 12 Cap 0   • Cyanocobalamin (VITAMIN B 12 PO) Take  by mouth.     • vitamin D (CHOLECALCIFEROL) 1000 UNIT Tab Take 4,000 Units by mouth every day.     • lysine 500 MG TABS Take 500 mg by mouth every day.     • TRAVATAN Z 0.004 % SOLN        No current Epic-ordered facility-administered medications on file.        Constitutional ROS: No unexpected change in weight, No weakness, No unexplained fevers, sweats, or chills  Pulmonary ROS: No chronic cough, sputum, or hemoptysis, No shortness of breath, No recent change in breathing  Cardiovascular ROS: No chest pain, No edema, No palpitations  Gastrointestinal ROS: No abdominal pain, No nausea, vomiting, diarrhea, or constipation, no blood in stool  Musculoskeletal/Extremities ROS: Positive per history of present illness  Neurologic ROS: Normal development, No seizures, No weakness  Psychiatric ROS: Positive for anxiety, per history of present illness    Physical exam:  /64   Pulse (!) 50   Temp 36.6 °C (97.8 °F)   Resp 16   Ht 1.575 m (5' 2\")   Wt 64 kg (141 lb)   SpO2 91%   BMI 25.79 kg/m²   General Appearance: Elderly female, alert, no distress, well-nourished, well-groomed  Skin: Skin color, texture, turgor normal. No rashes or lesions.  Oropharynx: positive findings: From, and mobile lump felt in lower outer gumline, just right of midline, no evidence of erythema or drainage or injury  Lungs: negative findings: normal respiratory rate and rhythm, normal effort  Musculoskeletal: negative findings: no evidence of joint instability, strength normal, no deformities present  Neurologic: intact, oriented, mood " appropriate, judgment intact. Cranial nerves II-12 grossly intact    Medical decision making/discussion: Patient encouraged to make an appointment with dentist to have lump in gumline assessed. I will like her to follow up with me in January for labs done before her visit. Instructed patient that it is okay to take Tylenol at bedtime. Voltaren has been refilled, she is to take this as prescribed. She declines flu shot this season.    JITENDRA was seen today for pain.    Diagnoses and all orders for this visit:    Primary osteoarthritis involving multiple joints  -     diclofenac EC (VOLTAREN) 75 MG Tablet Delayed Response; Take 1 Tab by mouth 2 times a day.    Chronic hand pain, right  -     REFERRAL TO HAND SURGERY    Bilateral hand pain    Chronic bilateral low back pain without sciatica    Anxiety    Gum inflammation          Please note that this dictation was created using voice recognition software. I have made every reasonable attempt to correct obvious errors, but I expect that there are errors of grammar and possibly content that I did not discover before finalizing the note.

## 2017-11-03 DIAGNOSIS — F41.9 ANXIETY: ICD-10-CM

## 2017-11-03 RX ORDER — ESCITALOPRAM OXALATE 10 MG/1
10 TABLET ORAL DAILY
Qty: 90 TAB | Refills: 0 | Status: SHIPPED | OUTPATIENT
Start: 2017-11-03 | End: 2018-01-17 | Stop reason: SDUPTHER

## 2017-11-03 NOTE — TELEPHONE ENCOUNTER
Was the patient seen in the last year in this department? Yes     Does patient have an active prescription for medications requested? No     Received Request Via: Pharmacy      Pt met protocol?: Yes, ov 10/17

## 2017-12-26 ENCOUNTER — HOSPITAL ENCOUNTER (OUTPATIENT)
Dept: LAB | Facility: MEDICAL CENTER | Age: 82
End: 2017-12-26
Attending: NURSE PRACTITIONER
Payer: MEDICARE

## 2017-12-26 DIAGNOSIS — E87.1 HYPONATREMIA: ICD-10-CM

## 2017-12-26 LAB
ANION GAP SERPL CALC-SCNC: 4 MMOL/L (ref 0–11.9)
BUN SERPL-MCNC: 31 MG/DL (ref 8–22)
CALCIUM SERPL-MCNC: 9.5 MG/DL (ref 8.5–10.5)
CHLORIDE SERPL-SCNC: 105 MMOL/L (ref 96–112)
CO2 SERPL-SCNC: 27 MMOL/L (ref 20–33)
CREAT SERPL-MCNC: 1.36 MG/DL (ref 0.5–1.4)
GFR SERPL CREATININE-BSD FRML MDRD: 37 ML/MIN/1.73 M 2
GLUCOSE SERPL-MCNC: 94 MG/DL (ref 65–99)
POTASSIUM SERPL-SCNC: 5.6 MMOL/L (ref 3.6–5.5)
SODIUM SERPL-SCNC: 136 MMOL/L (ref 135–145)

## 2017-12-26 PROCEDURE — 36415 COLL VENOUS BLD VENIPUNCTURE: CPT

## 2017-12-26 PROCEDURE — 80048 BASIC METABOLIC PNL TOTAL CA: CPT

## 2018-01-17 ENCOUNTER — OFFICE VISIT (OUTPATIENT)
Dept: MEDICAL GROUP | Facility: PHYSICIAN GROUP | Age: 83
End: 2018-01-17
Payer: MEDICARE

## 2018-01-17 VITALS
BODY MASS INDEX: 26.31 KG/M2 | OXYGEN SATURATION: 97 % | WEIGHT: 143 LBS | HEART RATE: 56 BPM | RESPIRATION RATE: 16 BRPM | SYSTOLIC BLOOD PRESSURE: 122 MMHG | HEIGHT: 62 IN | TEMPERATURE: 97.9 F | DIASTOLIC BLOOD PRESSURE: 78 MMHG

## 2018-01-17 DIAGNOSIS — E87.5 HYPERKALEMIA: ICD-10-CM

## 2018-01-17 DIAGNOSIS — N18.30 STAGE 3 CHRONIC KIDNEY DISEASE (HCC): ICD-10-CM

## 2018-01-17 DIAGNOSIS — M15.9 PRIMARY OSTEOARTHRITIS INVOLVING MULTIPLE JOINTS: ICD-10-CM

## 2018-01-17 DIAGNOSIS — E03.9 HYPOTHYROIDISM, UNSPECIFIED TYPE: ICD-10-CM

## 2018-01-17 DIAGNOSIS — I10 ESSENTIAL HYPERTENSION: ICD-10-CM

## 2018-01-17 DIAGNOSIS — F41.9 ANXIETY: ICD-10-CM

## 2018-01-17 DIAGNOSIS — N39.0 FREQUENT UTI: ICD-10-CM

## 2018-01-17 PROCEDURE — 99214 OFFICE O/P EST MOD 30 MIN: CPT | Performed by: NURSE PRACTITIONER

## 2018-01-17 RX ORDER — ESCITALOPRAM OXALATE 10 MG/1
10 TABLET ORAL DAILY
Qty: 90 TAB | Refills: 3 | Status: SHIPPED | OUTPATIENT
Start: 2018-01-17 | End: 2019-03-18 | Stop reason: SDUPTHER

## 2018-01-17 NOTE — ASSESSMENT & PLAN NOTE
This is a chronic condition, stable. Upon reviewing most recent last the patient was noted her GFR is in the high 30s, this is stable for her. We discussed the importance of avoiding nephrotoxic medications, stay well-hydrated, keeping blood pressure under good control and avoiding high salt diet. However it was noted that her potassium is slightly elevated and we will repeat this again in one week. She is not taking any over-the-counter or prescription potassium supplements.

## 2018-01-17 NOTE — ASSESSMENT & PLAN NOTE
Patient has chronic osteoarthritis for which she will sometimes take opioid pain medication. She is on controlled substances agreement but takes very little, in fact she still has pills left from prescription written in July. She otherwise takes over-the-counter Tylenol with good relief. Her pain is mostly in her bilateral knees and feet as well as low back pain.

## 2018-01-17 NOTE — ASSESSMENT & PLAN NOTE
Patient reports that she gets frequent urinary tract infections and would like to have a urinalysis with her routine labs. She does deny symptoms at this time but would like this checked because sometimes she states she does not notice that she has a urinary tract infection until it is too late. Urinalysis has been ordered along with her routine labs

## 2018-01-17 NOTE — PROGRESS NOTES
Chief Complaint   Patient presents with   • Hypothyroidism     fv labs         This is a 83 y.o.female patient that presents today with the following: Follow, review labs    CKD (chronic kidney disease)  This is a chronic condition, stable. Upon reviewing most recent last the patient was noted her GFR is in the high 30s, this is stable for her. We discussed the importance of avoiding nephrotoxic medications, stay well-hydrated, keeping blood pressure under good control and avoiding high salt diet. However it was noted that her potassium is slightly elevated and we will repeat this again in one week. She is not taking any over-the-counter or prescription potassium supplements.    Hyperkalemia  See additional notes on chronic kidney disease    Hypertension  This is a chronic condition, stable and fairly well-controlled with current medication including lisinopril 20 mg daily. Blood pressure today is 122/78 and she denies symptoms of hypertension. She does not need refills at this time, but can call as needed. She will be due for labs again before she sees me again in April.    Hypothyroidism  This is a chronic condition, stable and usually well controlled on current dose of levothyroxine, 50 µg daily. She does report symptoms of hypothyroidism, she states she has been intolerant to cold. We will check labs, I will notify her of results and further actions if needed.    Frequent UTI  Patient reports that she gets frequent urinary tract infections and would like to have a urinalysis with her routine labs. She does deny symptoms at this time but would like this checked because sometimes she states she does not notice that she has a urinary tract infection until it is too late. Urinalysis has been ordered along with her routine labs    Osteoarthritis  Patient has chronic osteoarthritis for which she will sometimes take opioid pain medication. She is on controlled substances agreement but takes very little, in fact she  still has pills left from prescription written in July. She otherwise takes over-the-counter Tylenol with good relief. Her pain is mostly in her bilateral knees and feet as well as low back pain.      Hospital Outpatient Visit on 12/26/2017   Component Date Value   • Sodium 12/26/2017 136    • Potassium 12/26/2017 5.6*   • Chloride 12/26/2017 105    • Co2 12/26/2017 27    • Glucose 12/26/2017 94    • Bun 12/26/2017 31*   • Creatinine 12/26/2017 1.36    • Calcium 12/26/2017 9.5    • Anion Gap 12/26/2017 4.0    • GFR If  12/26/2017 45*   • GFR If Non  Ameri* 12/26/2017 37*         clinical course has been stable    Past Medical History:   Diagnosis Date   • CLL (chronic lymphocytic leukemia) (CMS-HCC) 3/22/2016   • H/O scarlet fever     age 10   • HSV-1 infection    • HSV-2 infection    • Hyperlipidemia    • Hypertension        Past Surgical History:   Procedure Laterality Date   • ABDOMINAL HYSTERECTOMY TOTAL     • APPENDECTOMY     • KNEE ARTHROPLASTY TOTAL      right   • OPEN REDUCTION     • TONSILLECTOMY         Family History   Problem Relation Age of Onset   • Heart Disease Father    • Heart Attack Father    • Alcohol/Drug Father    • Cancer Sister      skin, not melanoma   • Diabetes Neg Hx    • Stroke Neg Hx        Patient has no known allergies.    Current Outpatient Prescriptions Ordered in [facility]   Medication Sig Dispense Refill   • escitalopram (LEXAPRO) 10 MG Tab Take 1 Tab by mouth every day. 90 Tab 3   • Misc. Devices Misc Incontinence devises--pads Unit is big box 1 Units 3   • Acetaminophen (APAP) 325 MG Tab Take 1.5385-3.0769 Tabs by mouth every 6 hours as needed. 90 Tab 1   • diclofenac EC (VOLTAREN) 75 MG Tablet Delayed Response Take 1 Tab by mouth 2 times a day. 60 Tab 3   • levothyroxine (SYNTHROID) 50 MCG Tab Take 1 Tab by mouth every day. 90 Tab 1   • lisinopril (PRINIVIL) 20 MG Tab Take 1 Tab by mouth every day. 90 Tab 1   • acyclovir (ZOVIRAX) 5 % Ointment Apply 1  "Application to affected area(s) every 3 hours. 1 Tube 6   • timolol (TIMOPTIC) 0.5 % Solution      • cyclosporin (RESTASIS) 0.05 % ophthalmic emulsion Place 1 Drop in both eyes 2 times a day.     • acetaminophen (TYLENOL) 500 MG Tab Take 1-2 Tabs by mouth 3 times a day as needed for Moderate Pain. 90 Tab 1   • ergocalciferol (DRISDOL) 45573 UNIT capsule Take 1 Cap by mouth every 7 days. 12 Cap 0   • Cyanocobalamin (VITAMIN B 12 PO) Take  by mouth.     • vitamin D (CHOLECALCIFEROL) 1000 UNIT Tab Take 4,000 Units by mouth every day.     • lysine 500 MG TABS Take 500 mg by mouth every day.     • TRAVATAN Z 0.004 % SOLN      • oxycodone-acetaminophen (PERCOCET) 5-325 MG Tab Take 1 Tab by mouth 1 time daily as needed for Moderate Pain. 45 Tab 0   • hydrOXYzine (ATARAX) 25 MG Tab 1/2 to 1 pill 3 times a day as needed 60 Tab 0     No current Epic-ordered facility-administered medications on file.        Constitutional ROS: No unexpected change in weight, No weakness, No unexplained fevers, sweats, or chills  Pulmonary ROS: No chronic cough, sputum, or hemoptysis, No shortness of breath, No recent change in breathing  Cardiovascular ROS: No chest pain, No edema, No palpitations, Positive for hypertension, controlled  Gastrointestinal ROS: No abdominal pain, No nausea, vomiting, diarrhea, or constipation  Musculoskeletal/Extremities ROS: Positive for osteoarthritis in multiple joints, per history of present illness  Neurologic ROS: Normal development, No seizures, No weakness  Psychiatric ROS: Positive for anxiety  Endocrine ROS: Positive per history of present illness    Physical exam:  /78   Pulse (!) 56   Temp 36.6 °C (97.9 °F)   Resp 16   Ht 1.575 m (5' 2\")   Wt 64.9 kg (143 lb)   SpO2 97%   BMI 26.16 kg/m²   General Appearance: Elderly female, alert, no distress, mildly overweight, well-groomed  Skin: Skin color, texture, turgor normal. No rashes or lesions.  Lungs: negative findings: normal respiratory " rate and rhythm, normal effort  Abdomen: Abdomen soft, non-tender. BS normal. No masses,  No organomegaly  Musculoskeletal: negative findings: no evidence of joint instability, no evidence of muscle atrophy, no deformities present  Neurologic: intact, oriented, mood appropriate, judgment intact. Cranial nerves II-12 grossly intact    Medical decision making/discussion: Patient to have labs done next week, she will be notified of results and further actions if needed. Medications have been refilled. She is to follow-up with me in 3-4 months, sooner if needed.    JITENDRA was seen today for hypothyroidism.    Diagnoses and all orders for this visit:    Hypothyroidism, unspecified type  -     TSH WITH REFLEX TO FT4; Future    Stage 3 chronic kidney disease  -     Cancel: BASIC METABOLIC PANEL; Future  -     COMP METABOLIC PANEL; Future    Anxiety  -     escitalopram (LEXAPRO) 10 MG Tab; Take 1 Tab by mouth every day.    Frequent UTI  -     URINALYSIS,CULTURE IF INDICATED; Future    Essential hypertension  -     LIPID PROFILE; Future  -     COMP METABOLIC PANEL; Future    Hyperkalemia    Primary osteoarthritis involving multiple joints    Other orders  -     Misc. Devices Misc; Incontinence devises--pads Unit is big box  -     Discontinue: acetaminophen 325 MG Tab; Take 1.5385-3.0769 Tabs by mouth every 6 hours as needed.  -     Acetaminophen (APAP) 325 MG Tab; Take 1.5385-3.0769 Tabs by mouth every 6 hours as needed.          Please note that this dictation was created using voice recognition software. I have made every reasonable attempt to correct obvious errors, but I expect that there are errors of grammar and possibly content that I did not discover before finalizing the note.

## 2018-01-17 NOTE — ASSESSMENT & PLAN NOTE
This is a chronic condition, stable and usually well controlled on current dose of levothyroxine, 50 µg daily. She does report symptoms of hypothyroidism, she states she has been intolerant to cold. We will check labs, I will notify her of results and further actions if needed.

## 2018-01-17 NOTE — ASSESSMENT & PLAN NOTE
This is a chronic condition, stable and fairly well-controlled with current medication including lisinopril 20 mg daily. Blood pressure today is 122/78 and she denies symptoms of hypertension. She does not need refills at this time, but can call as needed. She will be due for labs again before she sees me again in April.

## 2018-01-30 ENCOUNTER — HOSPITAL ENCOUNTER (OUTPATIENT)
Dept: LAB | Facility: MEDICAL CENTER | Age: 83
End: 2018-01-30
Attending: NURSE PRACTITIONER
Payer: MEDICARE

## 2018-01-30 DIAGNOSIS — I10 ESSENTIAL HYPERTENSION: ICD-10-CM

## 2018-01-30 DIAGNOSIS — E03.9 HYPOTHYROIDISM, UNSPECIFIED TYPE: ICD-10-CM

## 2018-01-30 DIAGNOSIS — N18.30 STAGE 3 CHRONIC KIDNEY DISEASE (HCC): ICD-10-CM

## 2018-01-30 DIAGNOSIS — N39.0 FREQUENT UTI: ICD-10-CM

## 2018-01-30 LAB
ALBUMIN SERPL BCP-MCNC: 3.6 G/DL (ref 3.2–4.9)
ALBUMIN/GLOB SERPL: 1.3 G/DL
ALP SERPL-CCNC: 41 U/L (ref 30–99)
ALT SERPL-CCNC: 11 U/L (ref 2–50)
ANION GAP SERPL CALC-SCNC: 4 MMOL/L (ref 0–11.9)
APPEARANCE UR: CLEAR
AST SERPL-CCNC: 15 U/L (ref 12–45)
BACTERIA #/AREA URNS HPF: NEGATIVE /HPF
BILIRUB SERPL-MCNC: 0.6 MG/DL (ref 0.1–1.5)
BILIRUB UR QL STRIP.AUTO: NEGATIVE
BUN SERPL-MCNC: 21 MG/DL (ref 8–22)
CALCIUM SERPL-MCNC: 8.9 MG/DL (ref 8.5–10.5)
CHLORIDE SERPL-SCNC: 103 MMOL/L (ref 96–112)
CHOLEST SERPL-MCNC: 215 MG/DL (ref 100–199)
CO2 SERPL-SCNC: 27 MMOL/L (ref 20–33)
COLOR UR: YELLOW
CREAT SERPL-MCNC: 1.39 MG/DL (ref 0.5–1.4)
CULTURE IF INDICATED INDCX: YES UA CULTURE
EPI CELLS #/AREA URNS HPF: ABNORMAL /HPF
GLOBULIN SER CALC-MCNC: 2.7 G/DL (ref 1.9–3.5)
GLUCOSE SERPL-MCNC: 72 MG/DL (ref 65–99)
GLUCOSE UR STRIP.AUTO-MCNC: NEGATIVE MG/DL
HDLC SERPL-MCNC: 55 MG/DL
HYALINE CASTS #/AREA URNS LPF: ABNORMAL /LPF
KETONES UR STRIP.AUTO-MCNC: ABNORMAL MG/DL
LDLC SERPL CALC-MCNC: 142 MG/DL
LEUKOCYTE ESTERASE UR QL STRIP.AUTO: ABNORMAL
MICRO URNS: ABNORMAL
NITRITE UR QL STRIP.AUTO: NEGATIVE
PH UR STRIP.AUTO: 5 [PH]
POTASSIUM SERPL-SCNC: 5.7 MMOL/L (ref 3.6–5.5)
PROT SERPL-MCNC: 6.3 G/DL (ref 6–8.2)
PROT UR QL STRIP: NEGATIVE MG/DL
RBC # URNS HPF: ABNORMAL /HPF
RBC UR QL AUTO: NEGATIVE
SODIUM SERPL-SCNC: 134 MMOL/L (ref 135–145)
SP GR UR STRIP.AUTO: 1.02
TRIGL SERPL-MCNC: 88 MG/DL (ref 0–149)
TSH SERPL DL<=0.005 MIU/L-ACNC: 2.66 UIU/ML (ref 0.38–5.33)
UROBILINOGEN UR STRIP.AUTO-MCNC: 0.2 MG/DL
WBC #/AREA URNS HPF: ABNORMAL /HPF

## 2018-01-30 PROCEDURE — 87086 URINE CULTURE/COLONY COUNT: CPT

## 2018-01-30 PROCEDURE — 80061 LIPID PANEL: CPT

## 2018-01-30 PROCEDURE — 81001 URINALYSIS AUTO W/SCOPE: CPT

## 2018-01-30 PROCEDURE — 36415 COLL VENOUS BLD VENIPUNCTURE: CPT

## 2018-01-30 PROCEDURE — 84443 ASSAY THYROID STIM HORMONE: CPT

## 2018-01-30 PROCEDURE — 80053 COMPREHEN METABOLIC PANEL: CPT

## 2018-02-01 LAB
BACTERIA UR CULT: NORMAL
SIGNIFICANT IND 70042: NORMAL
SITE SITE: NORMAL
SOURCE SOURCE: NORMAL

## 2018-02-06 DIAGNOSIS — I10 ESSENTIAL HYPERTENSION: ICD-10-CM

## 2018-02-06 RX ORDER — LISINOPRIL 20 MG/1
20 TABLET ORAL DAILY
Qty: 90 TAB | Refills: 1 | Status: SHIPPED | OUTPATIENT
Start: 2018-02-06 | End: 2018-08-03 | Stop reason: SDUPTHER

## 2018-02-08 ENCOUNTER — TELEPHONE (OUTPATIENT)
Dept: MEDICAL GROUP | Facility: PHYSICIAN GROUP | Age: 83
End: 2018-02-08

## 2018-02-08 NOTE — TELEPHONE ENCOUNTER
Called [phone] asked for Patient was told to hold and line disconnected. Called back and line was busy.

## 2018-02-08 NOTE — TELEPHONE ENCOUNTER
----- Message from DU Guardado sent at 2/5/2018  2:56 PM PST -----  Please let pt know that I have reviewed her labs, they look ok. Urine looked to be a contaminated specimen, not a true infection.

## 2018-02-10 DIAGNOSIS — M15.9 PRIMARY OSTEOARTHRITIS INVOLVING MULTIPLE JOINTS: ICD-10-CM

## 2018-02-12 RX ORDER — LEVOTHYROXINE SODIUM 0.05 MG/1
TABLET ORAL
Qty: 90 TAB | Refills: 0 | Status: SHIPPED | OUTPATIENT
Start: 2018-02-12 | End: 2018-06-05 | Stop reason: SDUPTHER

## 2018-02-12 RX ORDER — DICLOFENAC SODIUM 75 MG/1
TABLET, DELAYED RELEASE ORAL
Qty: 180 TAB | Refills: 0 | Status: SHIPPED | OUTPATIENT
Start: 2018-02-12 | End: 2018-05-14 | Stop reason: SDUPTHER

## 2018-03-27 ENCOUNTER — HOSPITAL ENCOUNTER (OUTPATIENT)
Dept: LAB | Facility: MEDICAL CENTER | Age: 83
End: 2018-03-27
Attending: NURSE PRACTITIONER
Payer: MEDICARE

## 2018-03-27 ENCOUNTER — OFFICE VISIT (OUTPATIENT)
Dept: MEDICAL GROUP | Facility: PHYSICIAN GROUP | Age: 83
End: 2018-03-27
Payer: MEDICARE

## 2018-03-27 VITALS
HEART RATE: 78 BPM | TEMPERATURE: 98.6 F | WEIGHT: 146 LBS | SYSTOLIC BLOOD PRESSURE: 112 MMHG | OXYGEN SATURATION: 97 % | HEIGHT: 62 IN | RESPIRATION RATE: 16 BRPM | BODY MASS INDEX: 26.87 KG/M2 | DIASTOLIC BLOOD PRESSURE: 68 MMHG

## 2018-03-27 DIAGNOSIS — E55.9 VITAMIN D DEFICIENCY DISEASE: ICD-10-CM

## 2018-03-27 DIAGNOSIS — C91.10 CLL (CHRONIC LYMPHOCYTIC LEUKEMIA) (HCC): ICD-10-CM

## 2018-03-27 DIAGNOSIS — F41.9 ANXIETY: ICD-10-CM

## 2018-03-27 DIAGNOSIS — E87.5 HYPERKALEMIA: ICD-10-CM

## 2018-03-27 DIAGNOSIS — F41.0 PANIC ATTACK: ICD-10-CM

## 2018-03-27 LAB
25(OH)D3 SERPL-MCNC: 46 NG/ML (ref 30–100)
ANION GAP SERPL CALC-SCNC: 5 MMOL/L (ref 0–11.9)
BASOPHILS # BLD AUTO: 0 % (ref 0–1.8)
BASOPHILS # BLD: 0 K/UL (ref 0–0.12)
BUN SERPL-MCNC: 18 MG/DL (ref 8–22)
CALCIUM SERPL-MCNC: 9.4 MG/DL (ref 8.5–10.5)
CHLORIDE SERPL-SCNC: 100 MMOL/L (ref 96–112)
CO2 SERPL-SCNC: 29 MMOL/L (ref 20–33)
CREAT SERPL-MCNC: 1.46 MG/DL (ref 0.5–1.4)
EOSINOPHIL # BLD AUTO: 0.45 K/UL (ref 0–0.51)
EOSINOPHIL NFR BLD: 1.7 % (ref 0–6.9)
ERYTHROCYTE [DISTWIDTH] IN BLOOD BY AUTOMATED COUNT: 46.5 FL (ref 35.9–50)
GLUCOSE SERPL-MCNC: 82 MG/DL (ref 65–99)
HCT VFR BLD AUTO: 38.5 % (ref 37–47)
HGB BLD-MCNC: 12.4 G/DL (ref 12–16)
LYMPHOCYTES # BLD AUTO: 22.7 K/UL (ref 1–4.8)
LYMPHOCYTES NFR BLD: 86 % (ref 22–41)
MANUAL DIFF BLD: NORMAL
MCH RBC QN AUTO: 31.2 PG (ref 27–33)
MCHC RBC AUTO-ENTMCNC: 32.2 G/DL (ref 33.6–35)
MCV RBC AUTO: 97 FL (ref 81.4–97.8)
MONOCYTES # BLD AUTO: 0.24 K/UL (ref 0–0.85)
MONOCYTES NFR BLD AUTO: 0.9 % (ref 0–13.4)
MORPHOLOGY BLD-IMP: NORMAL
NEUTROPHILS # BLD AUTO: 3.01 K/UL (ref 2–7.15)
NEUTROPHILS NFR BLD: 11.4 % (ref 44–72)
NRBC # BLD AUTO: 0 K/UL
NRBC BLD-RTO: 0 /100 WBC
PLATELET # BLD AUTO: 230 K/UL (ref 164–446)
PLATELET BLD QL SMEAR: NORMAL
PMV BLD AUTO: 11.4 FL (ref 9–12.9)
POTASSIUM SERPL-SCNC: 5.6 MMOL/L (ref 3.6–5.5)
RBC # BLD AUTO: 3.97 M/UL (ref 4.2–5.4)
RBC BLD AUTO: PRESENT
SMUDGE CELLS BLD QL SMEAR: NORMAL
SODIUM SERPL-SCNC: 134 MMOL/L (ref 135–145)
WBC # BLD AUTO: 26.4 K/UL (ref 4.8–10.8)

## 2018-03-27 PROCEDURE — 82306 VITAMIN D 25 HYDROXY: CPT

## 2018-03-27 PROCEDURE — 99214 OFFICE O/P EST MOD 30 MIN: CPT | Performed by: NURSE PRACTITIONER

## 2018-03-27 PROCEDURE — 85027 COMPLETE CBC AUTOMATED: CPT

## 2018-03-27 PROCEDURE — 85007 BL SMEAR W/DIFF WBC COUNT: CPT

## 2018-03-27 PROCEDURE — 80048 BASIC METABOLIC PNL TOTAL CA: CPT

## 2018-03-27 PROCEDURE — 36415 COLL VENOUS BLD VENIPUNCTURE: CPT

## 2018-03-27 NOTE — ASSESSMENT & PLAN NOTE
Patient does have history of chronic kidney disease, she is due for labs. With her recent labs in January she was noted to have a slightly elevated potassium, we will reassess with her labs. She is asymptomatic.

## 2018-03-27 NOTE — ASSESSMENT & PLAN NOTE
Patient does have history of CLL and is followed by oncologist, Dr. Miller every 6 months. She tells me that she only wants to be seen one year, but was told that she needs to at least have a CBC every 6 months she is requesting order. CBC has been ordered for her. She is currently asymptomatic.

## 2018-03-27 NOTE — ASSESSMENT & PLAN NOTE
This is a chronic condition, stable. She is due for labs, these have been ordered. She does take over-the-counter vitamin D supplements. She will be notified of results and further actions if needed.

## 2018-03-27 NOTE — ASSESSMENT & PLAN NOTE
Patient does have history of anxiety, this is chronic, stable and usually well controlled on Lexapro 10 mg daily. She did decide that she was going to try taking herself off the medication and stopped taking it for about a month. Unfortunately she did have increased anxiety and suffered to panic attacks. She was able to calm herself down by taking a walk during one panic attack and then with the other one after she calmed down a bit she took a drive to a park and reports to feeling much better. She did go back on the Lexapro and feels much better. She tolerates medications well with no significant bothersome side effects. She does not need refills, but can call as needed.

## 2018-03-27 NOTE — PROGRESS NOTES
Chief Complaint   Patient presents with   • Anxiety     anxiety attacks x 2         This is a 83 y.o.female patient that presents today with the following: Follow-up visit    Vitamin D deficiency disease  This is a chronic condition, stable. She is due for labs, these have been ordered. She does take over-the-counter vitamin D supplements. She will be notified of results and further actions if needed.    CLL (chronic lymphocytic leukemia) (CMS-[facility])  Patient does have history of CLL and is followed by oncologist, Dr. Miller every 6 months. She tells me that she only wants to be seen one year, but was told that she needs to at least have a CBC every 6 months she is requesting order. CBC has been ordered for her. She is currently asymptomatic.    Anxiety  Patient does have history of anxiety, this is chronic, stable and usually well controlled on Lexapro 10 mg daily. She did decide that she was going to try taking herself off the medication and stopped taking it for about a month. Unfortunately she did have increased anxiety and suffered to panic attacks. She was able to calm herself down by taking a walk during one panic attack and then with the other one after she calmed down a bit she took a drive to a park and reports to feeling much better. She did go back on the Lexapro and feels much better. She tolerates medications well with no significant bothersome side effects. She does not need refills, but can call as needed.    Hyperkalemia  Patient does have history of chronic kidney disease, she is due for labs. With her recent labs in January she was noted to have a slightly elevated potassium, we will reassess with her labs. She is asymptomatic.      No visits with results within 1 Month(s) from this visit.   Latest known visit with results is:   Hospital Outpatient Visit on 01/30/2018   Component Date Value   • TSH 01/30/2018 2.660    • Color 01/30/2018 Yellow    • Character 01/30/2018 Clear    • Specific Gravity  01/30/2018 1.018    • Ph 01/30/2018 5.0    • Glucose 01/30/2018 Negative    • Ketones 01/30/2018 Trace*   • Protein 01/30/2018 Negative    • Bilirubin 01/30/2018 Negative    • Urobilinogen, Urine 01/30/2018 0.2    • Nitrite 01/30/2018 Negative    • Leukocyte Esterase 01/30/2018 Trace*   • Occult Blood 01/30/2018 Negative    • Micro Urine Req 01/30/2018 Microscopic    • Culture Indicated 01/30/2018 Yes    • Cholesterol,Tot 01/30/2018 215*   • Triglycerides 01/30/2018 88    • HDL 01/30/2018 55    • LDL 01/30/2018 142*   • Sodium 01/30/2018 134*   • Potassium 01/30/2018 5.7*   • Chloride 01/30/2018 103    • Co2 01/30/2018 27    • Anion Gap 01/30/2018 4.0    • Glucose 01/30/2018 72    • Bun 01/30/2018 21    • Creatinine 01/30/2018 1.39    • Calcium 01/30/2018 8.9    • AST(SGOT) 01/30/2018 15    • ALT(SGPT) 01/30/2018 11    • Alkaline Phosphatase 01/30/2018 41    • Total Bilirubin 01/30/2018 0.6    • Albumin 01/30/2018 3.6    • Total Protein 01/30/2018 6.3    • Globulin 01/30/2018 2.7    • A-G Ratio 01/30/2018 1.3    • GFR If  01/30/2018 44*   • GFR If Non  Ameri* 01/30/2018 36*   • Significant Indicator 01/30/2018 NEG    • Source 01/30/2018 UR    • Urine Culture 01/30/2018 Mixed skin esther 10-50,000 cfu/mL    • WBC 01/30/2018 0-2    • RBC 01/30/2018 2-5*   • Bacteria 01/30/2018 Negative    • Epithelial Cells 01/30/2018 Few    • Hyaline Cast 01/30/2018 3-5*         clinical course has been stable    Past Medical History:   Diagnosis Date   • CLL (chronic lymphocytic leukemia) (CMS-HCC) 3/22/2016   • H/O scarlet fever     age 10   • HSV-1 infection    • HSV-2 infection    • Hyperlipidemia    • Hypertension        Past Surgical History:   Procedure Laterality Date   • ABDOMINAL HYSTERECTOMY TOTAL     • APPENDECTOMY     • KNEE ARTHROPLASTY TOTAL      right   • OPEN REDUCTION     • TONSILLECTOMY         Family History   Problem Relation Age of Onset   • Heart Disease Father    • Heart Attack Father     • Alcohol/Drug Father    • Cancer Sister      skin, not melanoma   • Diabetes Neg Hx    • Stroke Neg Hx        Patient has no known allergies.    Current Outpatient Prescriptions Ordered in [facility]   Medication Sig Dispense Refill   • levothyroxine (SYNTHROID) 50 MCG Tab TAKE 1 TABLET BY MOUTH DAILY 90 Tab 0   • diclofenac EC (VOLTAREN) 75 MG Tablet Delayed Response TAKE 1 TABLET BY MOUTH TWO TIMES DAILY 180 Tab 0   • lisinopril (PRINIVIL) 20 MG Tab Take 1 Tab by mouth every day. 90 Tab 1   • escitalopram (LEXAPRO) 10 MG Tab Take 1 Tab by mouth every day. 90 Tab 3   • Misc. Devices Misc Incontinence devises--pads Unit is big box 1 Units 3   • Acetaminophen (APAP) 325 MG Tab Take 1.5385-3.0769 Tabs by mouth every 6 hours as needed. 90 Tab 1   • acyclovir (ZOVIRAX) 5 % Ointment Apply 1 Application to affected area(s) every 3 hours. 1 Tube 6   • timolol (TIMOPTIC) 0.5 % Solution      • cyclosporin (RESTASIS) 0.05 % ophthalmic emulsion Place 1 Drop in both eyes 2 times a day.     • acetaminophen (TYLENOL) 500 MG Tab Take 1-2 Tabs by mouth 3 times a day as needed for Moderate Pain. 90 Tab 1   • ergocalciferol (DRISDOL) 08842 UNIT capsule Take 1 Cap by mouth every 7 days. 12 Cap 0   • Cyanocobalamin (VITAMIN B 12 PO) Take  by mouth.     • vitamin D (CHOLECALCIFEROL) 1000 UNIT Tab Take 4,000 Units by mouth every day.     • lysine 500 MG TABS Take 500 mg by mouth every day.     • TRAVATAN Z 0.004 % SOLN      • oxycodone-acetaminophen (PERCOCET) 5-325 MG Tab Take 1 Tab by mouth 1 time daily as needed for Moderate Pain. 45 Tab 0   • hydrOXYzine (ATARAX) 25 MG Tab 1/2 to 1 pill 3 times a day as needed 60 Tab 0     No current Epic-ordered facility-administered medications on file.        Constitutional ROS: No unexpected change in weight, No weakness, No unexplained fevers, sweats, or chills  Pulmonary ROS: No chronic cough, sputum, or hemoptysis, No shortness of breath, No recent change in breathing  Cardiovascular ROS: No  "chest pain, No edema, No palpitations  Gastrointestinal ROS: No abdominal pain, No nausea, vomiting, diarrhea, or constipation  Musculoskeletal/Extremities ROS: No clubbing, No peripheral edema, No pain, redness or swelling on the joints  Neurologic ROS: Normal development, No seizures, No weakness  Hematology/oncology ROS: Positive per history of present illness  Psychiatric ROS: Positive for anxiety, per history of present illness    Physical exam:  /68   Pulse 78   Temp 37 °C (98.6 °F)   Resp 16   Ht 1.575 m (5' 2\")   Wt 66.2 kg (146 lb)   SpO2 97%   BMI 26.70 kg/m²   General Appearance: Elderly female, alert, no distress, well-nourished, well-groomed  Skin: Skin color, texture, turgor normal. No rashes or lesions.  Lungs: negative findings: normal respiratory rate and rhythm, lungs clear to auscultation  Heart: negative. RRR without murmur, gallop, or rubs.  No ectopy.  Abdomen: Abdomen soft, non-tender. BS normal. No masses,  No organomegaly  Musculoskeletal: negative findings: no evidence of joint instability, no evidence of muscle atrophy, no deformities present  Neurologic: intact, oriented, mood appropriate, judgment intact. Cranial nerves II through XII grossly intact    Medical decision making/discussion: Patient to have labs done today, she will be notified of results and further actions if needed. She is taking medications as prescribed and call for refills as needed. She is to follow-up with me in July with other routine fasting labs done before visit. She is to keep her upcoming appointments with specialists including oncologist.    JITENDRA was seen today for anxiety.    Diagnoses and all orders for this visit:    Anxiety    Panic attack    Vitamin D deficiency disease  -     VITAMIN D,25 HYDROXY; Future    CLL (chronic lymphocytic leukemia) (CMS-HCC)  -     CBC WITH DIFFERENTIAL; Future    Hyperkalemia  -     BASIC METABOLIC PANEL; Future          Please note that this dictation was " created using voice recognition software. I have made every reasonable attempt to correct obvious errors, but I expect that there are errors of grammar and possibly content that I did not discover before finalizing the note.

## 2018-03-28 ENCOUNTER — TELEPHONE (OUTPATIENT)
Dept: MEDICAL GROUP | Facility: PHYSICIAN GROUP | Age: 83
End: 2018-03-28

## 2018-03-28 DIAGNOSIS — E87.5 HYPERKALEMIA: ICD-10-CM

## 2018-03-28 DIAGNOSIS — N28.9 RENAL INSUFFICIENCY: ICD-10-CM

## 2018-03-28 NOTE — TELEPHONE ENCOUNTER
----- Message from DU Guardado sent at 3/28/2018 12:46 PM PDT -----  Please let pt know that we will send CBC results to Dr. Miller's office. Her potassium level is still a bit elevated but better than it was previously. However, her kidney function has declined and I would like to recheck the lab in 1 week. If it continues to decline, we may need to send her in to a kidney doctor. Is she still having trouble urinating?

## 2018-03-28 NOTE — TELEPHONE ENCOUNTER
"Karla states that her urine just \"trickles\" but she has better control over it. She will be in for her labs in 1 week. Patient may give us a call back tomorrow 3/29/18 to go over provider message again-she was driving when we spoke and pulled to the side of the road.  "

## 2018-04-04 ENCOUNTER — HOSPITAL ENCOUNTER (OUTPATIENT)
Dept: LAB | Facility: MEDICAL CENTER | Age: 83
End: 2018-04-04
Attending: NURSE PRACTITIONER
Payer: MEDICARE

## 2018-04-04 DIAGNOSIS — N28.9 RENAL INSUFFICIENCY: ICD-10-CM

## 2018-04-04 DIAGNOSIS — E87.5 HYPERKALEMIA: ICD-10-CM

## 2018-04-04 LAB
ANION GAP SERPL CALC-SCNC: 5 MMOL/L (ref 0–11.9)
BUN SERPL-MCNC: 24 MG/DL (ref 8–22)
CALCIUM SERPL-MCNC: 9.1 MG/DL (ref 8.5–10.5)
CHLORIDE SERPL-SCNC: 98 MMOL/L (ref 96–112)
CO2 SERPL-SCNC: 30 MMOL/L (ref 20–33)
CREAT SERPL-MCNC: 1.41 MG/DL (ref 0.5–1.4)
GLUCOSE SERPL-MCNC: 77 MG/DL (ref 65–99)
POTASSIUM SERPL-SCNC: 5.3 MMOL/L (ref 3.6–5.5)
SODIUM SERPL-SCNC: 133 MMOL/L (ref 135–145)

## 2018-04-04 PROCEDURE — 36415 COLL VENOUS BLD VENIPUNCTURE: CPT

## 2018-04-04 PROCEDURE — 80048 BASIC METABOLIC PNL TOTAL CA: CPT

## 2018-04-05 ENCOUNTER — TELEPHONE (OUTPATIENT)
Dept: MEDICAL GROUP | Facility: PHYSICIAN GROUP | Age: 83
End: 2018-04-05

## 2018-04-05 DIAGNOSIS — N18.30 STAGE 3 CHRONIC KIDNEY DISEASE (HCC): ICD-10-CM

## 2018-04-05 DIAGNOSIS — E87.5 HYPERKALEMIA: ICD-10-CM

## 2018-04-05 NOTE — TELEPHONE ENCOUNTER
----- Message from DU Guardado sent at 4/5/2018  7:28 AM PDT -----  Please let pt know that her potassium level has normalized and kidney functions have improved slightly. I would like to recheck this in 1 month, I have placed order for her.

## 2018-05-14 DIAGNOSIS — M15.9 PRIMARY OSTEOARTHRITIS INVOLVING MULTIPLE JOINTS: ICD-10-CM

## 2018-05-15 NOTE — TELEPHONE ENCOUNTER
Was the patient seen in the last year in this department? Yes     Does patient have an active prescription for medications requested? No     Received Request Via: Pharmacy      Pt met protocol?: Yes    LAST OV 03/27/2018    Lab Results   Component Value Date/Time    SODIUM 133 (L) 04/04/2018 10:00 AM    POTASSIUM 5.3 04/04/2018 10:00 AM    CHLORIDE 98 04/04/2018 10:00 AM    CO2 30 04/04/2018 10:00 AM    GLUCOSE 77 04/04/2018 10:00 AM    BUN 24 (H) 04/04/2018 10:00 AM    CREATININE 1.41 (H) 04/04/2018 10:00 AM

## 2018-05-16 RX ORDER — DICLOFENAC SODIUM 75 MG/1
TABLET, DELAYED RELEASE ORAL
Qty: 60 TAB | Refills: 2 | Status: SHIPPED | OUTPATIENT
Start: 2018-05-16 | End: 2018-08-03 | Stop reason: SDUPTHER

## 2018-06-05 RX ORDER — LEVOTHYROXINE SODIUM 0.05 MG/1
TABLET ORAL
Qty: 90 TAB | Refills: 1 | Status: SHIPPED | OUTPATIENT
Start: 2018-06-05 | End: 2018-10-22 | Stop reason: SDUPTHER

## 2018-06-05 NOTE — TELEPHONE ENCOUNTER
Was the patient seen in the last year in this department? Yes     Does patient have an active prescription for medications requested? No     Received Request Via: Pharmacy      Pt met protocol?: Yes, OV 3/18, TSH checked 1/18 (within range)

## 2018-07-24 ENCOUNTER — OFFICE VISIT (OUTPATIENT)
Dept: MEDICAL GROUP | Facility: PHYSICIAN GROUP | Age: 83
End: 2018-07-24
Payer: MEDICARE

## 2018-07-24 VITALS
HEIGHT: 62 IN | SYSTOLIC BLOOD PRESSURE: 102 MMHG | RESPIRATION RATE: 16 BRPM | TEMPERATURE: 97.7 F | DIASTOLIC BLOOD PRESSURE: 62 MMHG | HEART RATE: 56 BPM | OXYGEN SATURATION: 97 % | BODY MASS INDEX: 26.5 KG/M2 | WEIGHT: 144 LBS

## 2018-07-24 DIAGNOSIS — E78.5 HYPERLIPIDEMIA, UNSPECIFIED HYPERLIPIDEMIA TYPE: ICD-10-CM

## 2018-07-24 DIAGNOSIS — C91.10 CLL (CHRONIC LYMPHOCYTIC LEUKEMIA) (HCC): ICD-10-CM

## 2018-07-24 DIAGNOSIS — I10 ESSENTIAL HYPERTENSION: ICD-10-CM

## 2018-07-24 DIAGNOSIS — L98.9 SKIN LESION: ICD-10-CM

## 2018-07-24 DIAGNOSIS — N18.30 STAGE 3 CHRONIC KIDNEY DISEASE (HCC): ICD-10-CM

## 2018-07-24 DIAGNOSIS — E03.9 HYPOTHYROIDISM, UNSPECIFIED TYPE: ICD-10-CM

## 2018-07-24 PROCEDURE — 99214 OFFICE O/P EST MOD 30 MIN: CPT | Performed by: NURSE PRACTITIONER

## 2018-07-24 ASSESSMENT — PATIENT HEALTH QUESTIONNAIRE - PHQ9: CLINICAL INTERPRETATION OF PHQ2 SCORE: 0

## 2018-07-24 NOTE — PATIENT INSTRUCTIONS
Labs tomorrow or by end of week    Referral to Derm    Follow up with Dr. Miller    Get some rest today    See me in 4-6 weeks

## 2018-07-24 NOTE — ASSESSMENT & PLAN NOTE
This is a chronic condition, stable.  She was due to have a repeat BMP done in May, she did not have this done.  She will have repeat labs done tomorrow or by the end of the week and she will be notified of results and further actions if needed.  We did discuss the importance of avoiding nephrotoxic medications, stay well hydrated, keeping blood pressure under control and avoiding excessive salt in her diet.

## 2018-07-24 NOTE — ASSESSMENT & PLAN NOTE
Patient has concerning skin lesion at the tip of her nose that has been there for several months to possibly a year that will not heal.  It does not bleed or itch, it is about 3 mm in size.  She also has lesion on her right temple that does appear to be seborrheic keratosis.  Will refer to dermatology for further evaluation

## 2018-07-24 NOTE — ASSESSMENT & PLAN NOTE
This is chronic, stable and well controlled on current medications.  Blood pressure is 102/62 and she denies symptoms of hypertension.  She does not need refills at this time, but can call as needed.  She is due for labs, these have been ordered.

## 2018-07-24 NOTE — ASSESSMENT & PLAN NOTE
This is a chronic condition, stable and fairly well controlled on current medications.  She denies symptoms of hypothyroidism however she does feel quite tired today but does admit that she did not go to bed until midnight last night and got up at 4 AM.  She is on levothyroxine 50 mcg daily and she is due for labs.  She does understand to take this on an empty stomach first thing in the morning apart from her other medications.

## 2018-07-24 NOTE — PROGRESS NOTES
Chief Complaint   Patient presents with   • Dizziness         This is a 83 y.o.female patient that presents today with the following: Follow-up visit, discuss acute and chronic conditions    Hypertension  This is chronic, stable and well controlled on current medications.  Blood pressure is 102/62 and she denies symptoms of hypertension.  She does not need refills at this time, but can call as needed.  She is due for labs, these have been ordered.    Hypothyroidism  This is a chronic condition, stable and fairly well controlled on current medications.  She denies symptoms of hypothyroidism however she does feel quite tired today but does admit that she did not go to bed until midnight last night and got up at 4 AM.  She is on levothyroxine 50 mcg daily and she is due for labs.  She does understand to take this on an empty stomach first thing in the morning apart from her other medications.    Hyperlipidemia  This is chronic, stable and controlled with lifestyle modifications.  She is due for labs, these have been ordered.    CKD (chronic kidney disease)  This is a chronic condition, stable.  She was due to have a repeat BMP done in May, she did not have this done.  She will have repeat labs done tomorrow or by the end of the week and she will be notified of results and further actions if needed.  We did discuss the importance of avoiding nephrotoxic medications, stay well hydrated, keeping blood pressure under control and avoiding excessive salt in her diet.    CLL (chronic lymphocytic leukemia) (CMS-HCC)  Patient does have history of CLL and is followed by oncologist, Dr. Miller, every 6 months.  At her last visit with me in mid-March she stated that she did not want to be seen twice a year but only once a year, advised her to discuss this with her oncologist at her next appointment.  She still has not made an appointment and she was strongly advised to do so.  She is currently asymptomatic, however she has been  tired since yesterday.  Again advised her to follow-up with oncologist    Skin lesion  Patient has concerning skin lesion at the tip of her nose that has been there for several months to possibly a year that will not heal.  It does not bleed or itch, it is about 3 mm in size.  She also has lesion on her right temple that does appear to be seborrheic keratosis.  Will refer to dermatology for further evaluation      No visits with results within 1 Month(s) from this visit.   Latest known visit with results is:   Hospital Outpatient Visit on 04/04/2018   Component Date Value   • Sodium 04/04/2018 133*   • Potassium 04/04/2018 5.3    • Chloride 04/04/2018 98    • Co2 04/04/2018 30    • Glucose 04/04/2018 77    • Bun 04/04/2018 24*   • Creatinine 04/04/2018 1.41*   • Calcium 04/04/2018 9.1    • Anion Gap 04/04/2018 5.0    • GFR If  04/04/2018 43*   • GFR If Non  Ameri* 04/04/2018 36*         clinical course has been stable    Past Medical History:   Diagnosis Date   • CLL (chronic lymphocytic leukemia) (HCC) 3/22/2016   • H/O scarlet fever     age 10   • HSV-1 infection    • HSV-2 infection    • Hyperlipidemia    • Hypertension        Past Surgical History:   Procedure Laterality Date   • ABDOMINAL HYSTERECTOMY TOTAL     • APPENDECTOMY     • KNEE ARTHROPLASTY TOTAL      right   • OPEN REDUCTION     • TONSILLECTOMY         Family History   Problem Relation Age of Onset   • Heart Disease Father    • Heart Attack Father    • Alcohol/Drug Father    • Cancer Sister      skin, not melanoma   • Diabetes Neg Hx    • Stroke Neg Hx        Patient has no known allergies.    Current Outpatient Prescriptions [facility] in [facility]   Medication Sig Dispense Refill   • levothyroxine (SYNTHROID) 50 MCG Tab TAKE 1 TABLET BY MOUTH DAILY 90 Tab 1   • diclofenac EC (VOLTAREN) 75 MG Tablet Delayed Response TAKE 1 TABLET BY MOUTH TWO TIMES DAILY 60 Tab 2   • lisinopril (PRINIVIL) 20 MG Tab Take 1 Tab by mouth every day. 90 Tab  1   • escitalopram (LEXAPRO) 10 MG Tab Take 1 Tab by mouth every day. 90 Tab 3   • Misc. Devices Misc Incontinence devises--pads Unit is big box 1 Units 3   • acyclovir (ZOVIRAX) 5 % Ointment Apply 1 Application to affected area(s) every 3 hours. 1 Tube 6   • timolol (TIMOPTIC) 0.5 % Solution      • cyclosporin (RESTASIS) 0.05 % ophthalmic emulsion Place 1 Drop in both eyes 2 times a day.     • acetaminophen (TYLENOL) 500 MG Tab Take 1-2 Tabs by mouth 3 times a day as needed for Moderate Pain. 90 Tab 1   • Cyanocobalamin (VITAMIN B 12 PO) Take  by mouth.     • vitamin D (CHOLECALCIFEROL) 1000 UNIT Tab Take 4,000 Units by mouth every day.     • lysine 500 MG TABS Take 500 mg by mouth every day.     • TRAVATAN Z 0.004 % SOLN      • Acetaminophen (APAP) 325 MG Tab Take 1.5385-3.0769 Tabs by mouth every 6 hours as needed. 90 Tab 1   • oxycodone-acetaminophen (PERCOCET) 5-325 MG Tab Take 1 Tab by mouth 1 time daily as needed for Moderate Pain. 45 Tab 0   • hydrOXYzine (ATARAX) 25 MG Tab 1/2 to 1 pill 3 times a day as needed 60 Tab 0   • ergocalciferol (DRISDOL) 33198 UNIT capsule Take 1 Cap by mouth every 7 days. 12 Cap 0     No current Epic-ordered facility-administered medications on file.        Constitutional ROS: No unexpected change in weight, No weakness, No unexplained fevers, sweats, or chills  Pulmonary ROS: No chronic cough, sputum, or hemoptysis, No shortness of breath, No recent change in breathing  Cardiovascular ROS: No chest pain, No edema, No palpitations, Positive for hypertension and hyperlipidemia  Gastrointestinal ROS: No abdominal pain, No nausea, vomiting, diarrhea, or constipation  Musculoskeletal/Extremities ROS: No clubbing, No peripheral edema, No pain, redness or swelling on the joints  Neurologic ROS: Normal development, No seizures, No weakness, Positive for dizziness  Endocrine ROS: Positive per HPI  Genitourinary ROS: Positive per HPI    Physical exam:  /62   Pulse (!) 56   Temp  "36.5 °C (97.7 °F)   Resp 16   Ht 1.575 m (5' 2\")   Wt 65.3 kg (144 lb)   SpO2 97%   BMI 26.34 kg/m²   General Appearance: Elderly female, alert, no distress but tired appearing, moderately overweight, well-groomed  Skin: Positive for 2-3 mm lesion on tip of nose as well as 1 to 1-1/2 cm in diameter flesh-colored seborrheic keratosis on right temple  Lungs: negative findings: normal respiratory rate and rhythm, lungs clear to auscultation  Heart: negative. RRR without murmur, gallop, or rubs.  No ectopy.  Abdomen: Abdomen soft, non-tender. BS normal. No masses,  No organomegaly  Musculoskeletal: negative findings: no evidence of joint instability, no evidence of muscle atrophy, no deformities present  Neurologic: intact, CN II through XII grossly intact    Medical decision making/discussion: Patient was advised to follow-up with her oncologist for ongoing surveillance of CLL.  She will have labs done in the next couple of days and follow-up with me in 4-6 weeks to review labs.  She is to take her medications as prescribed and call for refills as needed.  She was given strict ER precautions, should she develop any neurological symptoms consistent with stroke.  She has been referred to dermatology for further evaluation of new skin lesions on face.    JITENDRA was seen today for dizziness.    Diagnoses and all orders for this visit:    CLL (chronic lymphocytic leukemia) (HCC)    Stage 3 chronic kidney disease  -     COMP METABOLIC PANEL; Future    Hypothyroidism, unspecified type  -     TSH WITH REFLEX TO FT4; Future    Essential hypertension  -     COMP METABOLIC PANEL; Future  -     LIPID PROFILE; Future    Hyperlipidemia, unspecified hyperlipidemia type  -     COMP METABOLIC PANEL; Future  -     LIPID PROFILE; Future    Skin lesion  -     REFERRAL TO DERMATOLOGY          Please note that this dictation was created using voice recognition software. I have made every reasonable attempt to correct obvious errors, but " I expect that there are errors of grammar and possibly content that I did not discover before finalizing the note.

## 2018-07-24 NOTE — ASSESSMENT & PLAN NOTE
Patient does have history of CLL and is followed by oncologist, Dr. Miller, every 6 months.  At her last visit with me in mid-March she stated that she did not want to be seen twice a year but only once a year, advised her to discuss this with her oncologist at her next appointment.  She still has not made an appointment and she was strongly advised to do so.  She is currently asymptomatic, however she has been tired since yesterday.  Again advised her to follow-up with oncologist

## 2018-07-27 ENCOUNTER — HOSPITAL ENCOUNTER (OUTPATIENT)
Dept: LAB | Facility: MEDICAL CENTER | Age: 83
End: 2018-07-27
Attending: NURSE PRACTITIONER
Payer: MEDICARE

## 2018-07-27 DIAGNOSIS — E78.5 HYPERLIPIDEMIA, UNSPECIFIED HYPERLIPIDEMIA TYPE: ICD-10-CM

## 2018-07-27 DIAGNOSIS — E03.9 HYPOTHYROIDISM, UNSPECIFIED TYPE: ICD-10-CM

## 2018-07-27 DIAGNOSIS — N18.30 STAGE 3 CHRONIC KIDNEY DISEASE (HCC): ICD-10-CM

## 2018-07-27 DIAGNOSIS — I10 ESSENTIAL HYPERTENSION: ICD-10-CM

## 2018-07-27 LAB
ALBUMIN SERPL BCP-MCNC: 4.1 G/DL (ref 3.2–4.9)
ALBUMIN/GLOB SERPL: 1.5 G/DL
ALP SERPL-CCNC: 50 U/L (ref 30–99)
ALT SERPL-CCNC: 11 U/L (ref 2–50)
ANION GAP SERPL CALC-SCNC: 5 MMOL/L (ref 0–11.9)
AST SERPL-CCNC: 15 U/L (ref 12–45)
BILIRUB SERPL-MCNC: 0.6 MG/DL (ref 0.1–1.5)
BUN SERPL-MCNC: 30 MG/DL (ref 8–22)
CALCIUM SERPL-MCNC: 9.7 MG/DL (ref 8.5–10.5)
CHLORIDE SERPL-SCNC: 104 MMOL/L (ref 96–112)
CHOLEST SERPL-MCNC: 220 MG/DL (ref 100–199)
CO2 SERPL-SCNC: 27 MMOL/L (ref 20–33)
CREAT SERPL-MCNC: 1.36 MG/DL (ref 0.5–1.4)
GLOBULIN SER CALC-MCNC: 2.8 G/DL (ref 1.9–3.5)
GLUCOSE SERPL-MCNC: 80 MG/DL (ref 65–99)
HDLC SERPL-MCNC: 53 MG/DL
LDLC SERPL CALC-MCNC: 146 MG/DL
POTASSIUM SERPL-SCNC: 5.6 MMOL/L (ref 3.6–5.5)
PROT SERPL-MCNC: 6.9 G/DL (ref 6–8.2)
SODIUM SERPL-SCNC: 136 MMOL/L (ref 135–145)
TRIGL SERPL-MCNC: 106 MG/DL (ref 0–149)
TSH SERPL DL<=0.005 MIU/L-ACNC: 2.94 UIU/ML (ref 0.38–5.33)

## 2018-07-27 PROCEDURE — 80053 COMPREHEN METABOLIC PANEL: CPT

## 2018-07-27 PROCEDURE — 36415 COLL VENOUS BLD VENIPUNCTURE: CPT

## 2018-07-27 PROCEDURE — 84443 ASSAY THYROID STIM HORMONE: CPT

## 2018-07-27 PROCEDURE — 80061 LIPID PANEL: CPT

## 2018-07-30 ENCOUNTER — TELEPHONE (OUTPATIENT)
Dept: MEDICAL GROUP | Facility: PHYSICIAN GROUP | Age: 83
End: 2018-07-30

## 2018-07-30 DIAGNOSIS — E87.5 HYPERKALEMIA: ICD-10-CM

## 2018-07-30 NOTE — TELEPHONE ENCOUNTER
Potassium is only slightly elevated, but when it is moderate to severely elevated, there is increase risk of dangerous heart arrytthmia.  Here are some example of food high in potassium:    · High-potassium foods (more than 200 mg per serving):  ? 1 medium banana   ? ½ of a papaya (390)  ? ½ cup of prune juice (370)  ? ¼ cup of raisins (270)  ? 1 medium guerline (325) or kiwi (240)  ? 1 small orange (240) or ½ cup of orange juice (235)  ? ½ cup of cubed cantaloupe (215) or diced honeydew melon (200)  ? 1 medium pear (200)  · Medium-potassium foods (50 to 200 mg per serving):  ? 1 medium peach (185)  ? 1 small apple or ½ cup of apple juice (150)  ? ½ cup of peaches canned in juice (120)  ? ½ cup of canned pineapple (100)  ? ½ cup of fresh, sliced strawberries (125)  ? ½ cup of watermelon (85)    If she is eating an excessive amount of any of these, maybe just decrease them.      this can also be due to some of her medications, as well as chronic kidney disease--which she has.    will have her recheck labs in 2 weeks

## 2018-07-30 NOTE — TELEPHONE ENCOUNTER
Patient notified and stated that she is not currently using a potassium supplement. She would like to know if it could come from food that she is eating. She would also like to know what the dangers of an elevated potassium level are.

## 2018-07-30 NOTE — TELEPHONE ENCOUNTER
----- Message from DU Guardado sent at 7/29/2018  7:57 AM PDT -----  Please let pt know that her kidney function looks better, but potassium is a bit elevated again. Please ask her if she is taking any OTC potassium supplements

## 2018-07-31 NOTE — TELEPHONE ENCOUNTER
Patient notified and agrees. She stated that she has not been eating excessive amount of any of the foods listed.

## 2018-08-03 DIAGNOSIS — I10 ESSENTIAL HYPERTENSION: ICD-10-CM

## 2018-08-03 DIAGNOSIS — M15.9 PRIMARY OSTEOARTHRITIS INVOLVING MULTIPLE JOINTS: ICD-10-CM

## 2018-08-06 RX ORDER — LISINOPRIL 20 MG/1
TABLET ORAL
Qty: 90 TAB | Refills: 0 | Status: SHIPPED | OUTPATIENT
Start: 2018-08-06 | End: 2018-10-22 | Stop reason: SDUPTHER

## 2018-08-06 RX ORDER — DICLOFENAC SODIUM 75 MG/1
TABLET, DELAYED RELEASE ORAL
Qty: 180 TAB | Refills: 0 | Status: SHIPPED | OUTPATIENT
Start: 2018-08-06 | End: 2018-12-18

## 2018-08-14 ENCOUNTER — HOSPITAL ENCOUNTER (OUTPATIENT)
Dept: LAB | Facility: MEDICAL CENTER | Age: 83
End: 2018-08-14
Attending: NURSE PRACTITIONER
Payer: MEDICARE

## 2018-08-14 DIAGNOSIS — N18.30 STAGE 3 CHRONIC KIDNEY DISEASE (HCC): ICD-10-CM

## 2018-08-14 DIAGNOSIS — E87.5 HYPERKALEMIA: ICD-10-CM

## 2018-08-14 LAB
ANION GAP SERPL CALC-SCNC: 6 MMOL/L (ref 0–11.9)
BUN SERPL-MCNC: 23 MG/DL (ref 8–22)
CALCIUM SERPL-MCNC: 9.2 MG/DL (ref 8.5–10.5)
CHLORIDE SERPL-SCNC: 102 MMOL/L (ref 96–112)
CO2 SERPL-SCNC: 28 MMOL/L (ref 20–33)
CREAT SERPL-MCNC: 1.5 MG/DL (ref 0.5–1.4)
GLUCOSE SERPL-MCNC: 86 MG/DL (ref 65–99)
POTASSIUM SERPL-SCNC: 4.9 MMOL/L (ref 3.6–5.5)
SODIUM SERPL-SCNC: 136 MMOL/L (ref 135–145)

## 2018-08-14 PROCEDURE — 80048 BASIC METABOLIC PNL TOTAL CA: CPT

## 2018-08-14 PROCEDURE — 36415 COLL VENOUS BLD VENIPUNCTURE: CPT

## 2018-08-22 ENCOUNTER — OFFICE VISIT (OUTPATIENT)
Dept: MEDICAL GROUP | Facility: PHYSICIAN GROUP | Age: 83
End: 2018-08-22
Payer: MEDICARE

## 2018-08-22 VITALS
HEART RATE: 80 BPM | TEMPERATURE: 98.1 F | DIASTOLIC BLOOD PRESSURE: 80 MMHG | HEIGHT: 62 IN | RESPIRATION RATE: 16 BRPM | SYSTOLIC BLOOD PRESSURE: 130 MMHG | WEIGHT: 143 LBS | OXYGEN SATURATION: 96 % | BODY MASS INDEX: 26.31 KG/M2

## 2018-08-22 DIAGNOSIS — R32 URINARY INCONTINENCE, UNSPECIFIED TYPE: ICD-10-CM

## 2018-08-22 DIAGNOSIS — N18.30 STAGE 3 CHRONIC KIDNEY DISEASE (HCC): ICD-10-CM

## 2018-08-22 DIAGNOSIS — Z91.148 CONTROLLED SUBSTANCE AGREEMENT TERMINATED: ICD-10-CM

## 2018-08-22 PROBLEM — Z79.891 CHRONIC USE OF OPIATE DRUGS THERAPEUTIC PURPOSES: Status: RESOLVED | Noted: 2017-07-14 | Resolved: 2018-08-22

## 2018-08-22 PROCEDURE — 99214 OFFICE O/P EST MOD 30 MIN: CPT | Performed by: NURSE PRACTITIONER

## 2018-08-22 NOTE — LETTER
August 22, 2018        RE: JITENDRA Talley     To Whom It May Concern:    This letter is to inform you that the above named patient who has been under my care for the last couple of years has a prescription on file both with me and at her pharmacy--[facility]--for incontinence supplies that she uses daily.   If you have any questions or concerns, please do not hesitate to call.     Sincerely,       DEANN Guardado, FNP-C

## 2018-08-22 NOTE — PATIENT INSTRUCTIONS
Referral to nephrology--via telemedicine    Labs in 3-4 weeks    Will have you decrease the diclofenac to 1 pill daily for 1-2 weeks, then only take every couple of days--can continue with Tylenol Extra Strength    Stay well hydrated, about 48-64 ounces per day, avoid excessive salt in the diet    Try taking the laxative every other day, and try drinking low darian, low sugar electrolyte replacement fluids such as Gatorade or PowerAde     see me again in 6- weeks, sooner if needed

## 2018-08-23 PROBLEM — R32 URINARY INCONTINENCE: Status: ACTIVE | Noted: 2018-08-23

## 2018-08-23 NOTE — PROGRESS NOTES
Chief Complaint   Patient presents with   • Chronic Kidney Disease     fv labs         This is a 83 y.o.female patient that presents today with the following: follow up, review labs    Controlled substance agreement terminated  Pt no longer on controlled substance, will terminate contract.    CKD (chronic kidney disease)  This is a chronic condition, stable. GFR, BUN and creatinine have fluctuated over the past several months, as has her potassium level. However, the eGFR has steadily declined and is now down in low 30s. We have discussed avoiding nephrotoxic medications--she is going to stop the Voltaren and she going to try and not used laxative on daily basis. She has been also advised to stay adequately hydrated, but does not have to drink an excessive amount--was advised to drink between 48 and 64 ounces per day. Her BP is now under good control and she maintains that she does not consume a lot of salt in her diet.   Would like her to see nephrologist for consultation, she can do via Telemedicine, and follow up with me in 6 weeks with labs.    Urinary incontinence  This is chronic, stable and has not worsened. She has an order for incontinence supplies and needs refills, this was done for her      Hospital Outpatient Visit on 08/14/2018   Component Date Value   • Sodium 08/14/2018 136    • Potassium 08/14/2018 4.9    • Chloride 08/14/2018 102    • Co2 08/14/2018 28    • Glucose 08/14/2018 86    • Bun 08/14/2018 23*   • Creatinine 08/14/2018 1.50*   • Calcium 08/14/2018 9.2    • Anion Gap 08/14/2018 6.0    • GFR If  08/14/2018 40*   • GFR If Non  [facility]* 08/14/2018 33*   Hospital Outpatient Visit on 07/27/2018   Component Date Value   • Sodium 07/27/2018 136    • Potassium 07/27/2018 5.6*   • Chloride 07/27/2018 104    • Co2 07/27/2018 27    • Anion Gap 07/27/2018 5.0    • Glucose 07/27/2018 80    • Bun 07/27/2018 30*   • Creatinine 07/27/2018 1.36    • Calcium 07/27/2018 9.7    • AST(SGOT)  07/27/2018 15    • ALT(SGPT) 07/27/2018 11    • Alkaline Phosphatase 07/27/2018 50    • Total Bilirubin 07/27/2018 0.6    • Albumin 07/27/2018 4.1    • Total Protein 07/27/2018 6.9    • Globulin 07/27/2018 2.8    • A-G Ratio 07/27/2018 1.5    • Cholesterol,Tot 07/27/2018 220*   • Triglycerides 07/27/2018 106    • HDL 07/27/2018 53    • LDL 07/27/2018 146*   • TSH 07/27/2018 2.940    • GFR If  07/27/2018 45*   • GFR If Non  [facility]* 07/27/2018 37*         clinical course has been stable    Past Medical History:   Diagnosis Date   • CLL (chronic lymphocytic leukemia) (HCC) 3/22/2016   • H/O scarlet fever     age 10   • HSV-1 infection    • HSV-2 infection    • Hyperlipidemia    • Hypertension        Past Surgical History:   Procedure Laterality Date   • ABDOMINAL HYSTERECTOMY TOTAL     • APPENDECTOMY     • KNEE ARTHROPLASTY TOTAL      right   • OPEN REDUCTION     • TONSILLECTOMY         Family History   Problem Relation Age of Onset   • Heart Disease Father    • Heart Attack Father    • Alcohol/Drug Father    • Cancer Sister         skin, not melanoma   • Diabetes Neg Hx    • Stroke Neg Hx        Patient has no known allergies.    Current Outpatient Prescriptions [facility]   Medication Sig Dispense Refill   • Misc. Devices Misc Incontinence devises, Prevail incontinence, pads 16 per box, unit 6 Units 3   • diclofenac EC (VOLTAREN) 75 MG Tablet Delayed Response TAKE 1 TABLET BY MOUTH TWO TIMES DAILY 180 Tab 0   • lisinopril (PRINIVIL) 20 MG Tab TAKE 1 TABLET BY MOUTH DAILY 90 Tab 0   • levothyroxine (SYNTHROID) 50 MCG Tab TAKE 1 TABLET BY MOUTH DAILY 90 Tab 1   • escitalopram (LEXAPRO) 10 MG Tab Take 1 Tab by mouth every day. 90 Tab 3   • Acetaminophen (APAP) 325 MG Tab Take 1.5385-3.0769 Tabs by mouth every 6 hours as needed. 90 Tab 1   • acyclovir (ZOVIRAX) 5 % Ointment Apply 1 Application to affected area(s) every 3 hours. 1 Tube 6   • timolol (TIMOPTIC) 0.5 % Solution      • cyclosporin  "(RESTASIS) 0.05 % ophthalmic emulsion Place 1 Drop in both eyes 2 times a day.     • acetaminophen (TYLENOL) 500 MG Tab Take 1-2 Tabs by mouth 3 times a day as needed for Moderate Pain. 90 Tab 1   • ergocalciferol (DRISDOL) 60545 UNIT capsule Take 1 Cap by mouth every 7 days. 12 Cap 0   • Cyanocobalamin (VITAMIN B 12 PO) Take  by mouth.     • vitamin D (CHOLECALCIFEROL) 1000 UNIT Tab Take 4,000 Units by mouth every day.     • lysine 500 MG TABS Take 500 mg by mouth every day.     • TRAVATAN Z 0.004 % SOLN        No current Epic-ordered facility-administered medications on file.        Constitutional ROS: No unexpected change in weight, No weakness, No unexplained fevers, sweats, or chills  Pulmonary ROS: No chronic cough, sputum, or hemoptysis, No shortness of breath, No recent change in breathing  Cardiovascular ROS: No chest pain, Positive for hypertension   Musculoskeletal/Extremities ROS: No clubbing, No peripheral edema, No pain, redness or swelling on the joints  Neurologic ROS: Normal development, No seizures, No weakness  Genitourinary ROS:positive per HPI    Physical exam:  /80   Pulse 80   Temp 36.7 °C (98.1 °F)   Resp 16   Ht 1.575 m (5' 2\")   Wt 64.9 kg (143 lb)   SpO2 96%   BMI 26.16 kg/m²   General Appearance: elderly female, alert, no distress, overweight, well groomed  Skin: Skin color, texture, turgor normal. No rashes or lesions.  Lungs: negative findings: normal respiratory rate and rhythm, lungs clear to auscultation  Heart: negative. RRR without murmur, gallop, or rubs.  No ectopy.  Abdomen: Abdomen soft, non-tender. BS normal. No masses,  No organomegaly  Musculoskeletal: negative findings: ROM of all joints is normal, no evidence of joint instability, strength normal, no deformities present  Neurologic: intact    Medical decision making/discussion: will refer to nephrology for further evaluation for declining kidney function. Incontinence supplies refilled. Will have her follow up " with me in 6 weeks with labs before visit.    JITENDRA was seen today for chronic kidney disease.    Diagnoses and all orders for this visit:    Stage 3 chronic kidney disease  -     REFERRAL TO NEPHROLOGY  -     BASIC METABOLIC PANEL; Future    Controlled substance agreement terminated    Urinary incontinence, unspecified type  -     Misc. Devices Misc; Incontinence devises, Prevail incontinence, pads 16 per box, unit          Please note that this dictation was created using voice recognition software. I have made every reasonable attempt to correct obvious errors, but I expect that there are errors of grammar and possibly content that I did not discover before finalizing the note.

## 2018-08-23 NOTE — ASSESSMENT & PLAN NOTE
This is chronic, stable and has not worsened. She has an order for incontinence supplies and needs refills, this was done for her

## 2018-08-23 NOTE — ASSESSMENT & PLAN NOTE
This is a chronic condition, stable. GFR, BUN and creatinine have fluctuated over the past several months, as has her potassium level. However, the eGFR has steadily declined and is now down in low 30s. We have discussed avoiding nephrotoxic medications--she is going to stop the Voltaren and she going to try and not used laxative on daily basis. She has been also advised to stay adequately hydrated, but does not have to drink an excessive amount--was advised to drink between 48 and 64 ounces per day. Her BP is now under good control and she maintains that she does not consume a lot of salt in her diet.   Would like her to see nephrologist for consultation, she can do via Telemedicine, and follow up with me in 6 weeks with labs.

## 2018-09-11 ENCOUNTER — APPOINTMENT (RX ONLY)
Dept: URBAN - METROPOLITAN AREA CLINIC 4 | Facility: CLINIC | Age: 83
Setting detail: DERMATOLOGY
End: 2018-09-11

## 2018-09-11 DIAGNOSIS — L57.0 ACTINIC KERATOSIS: ICD-10-CM

## 2018-09-11 DIAGNOSIS — L82.0 INFLAMED SEBORRHEIC KERATOSIS: ICD-10-CM

## 2018-09-11 PROBLEM — M12.9 ARTHROPATHY, UNSPECIFIED: Status: ACTIVE | Noted: 2018-09-11

## 2018-09-11 PROBLEM — E03.9 HYPOTHYROIDISM, UNSPECIFIED: Status: ACTIVE | Noted: 2018-09-11

## 2018-09-11 PROBLEM — I10 ESSENTIAL (PRIMARY) HYPERTENSION: Status: ACTIVE | Noted: 2018-09-11

## 2018-09-11 PROCEDURE — ? COUNSELING

## 2018-09-11 PROCEDURE — 17003 DESTRUCT PREMALG LES 2-14: CPT

## 2018-09-11 PROCEDURE — ? LIQUID NITROGEN

## 2018-09-11 PROCEDURE — 17000 DESTRUCT PREMALG LESION: CPT

## 2018-09-11 PROCEDURE — ? ADDITIONAL NOTES

## 2018-09-11 PROCEDURE — 99212 OFFICE O/P EST SF 10 MIN: CPT | Mod: 25

## 2018-09-11 ASSESSMENT — LOCATION ZONE DERM
LOCATION ZONE: HAND
LOCATION ZONE: NOSE
LOCATION ZONE: FACE

## 2018-09-11 ASSESSMENT — LOCATION SIMPLE DESCRIPTION DERM
LOCATION SIMPLE: RIGHT HAND
LOCATION SIMPLE: NOSE
LOCATION SIMPLE: RIGHT CHEEK

## 2018-09-11 ASSESSMENT — LOCATION DETAILED DESCRIPTION DERM
LOCATION DETAILED: RIGHT SUPERIOR LATERAL MALAR CHEEK
LOCATION DETAILED: RIGHT RADIAL DORSAL HAND
LOCATION DETAILED: NASAL SUPRATIP

## 2018-09-11 NOTE — PROCEDURE: LIQUID NITROGEN
Post-Care Instructions: I reviewed with the patient in detail post-care instructions. Patient is to wear sunprotection, and avoid picking at any of the treated lesions. Pt may apply Vaseline to crusted or scabbing areas.
Render Post-Care Instructions In Note?: yes
Detail Level: Simple
Number Of Freeze-Thaw Cycles: 2 freeze-thaw cycles
Consent: The patient's consent was obtained including but not limited to risks of crusting, scabbing, blistering, scarring, darker or lighter pigmentary change, recurrence, incomplete removal and infection.
Duration Of Freeze Thaw-Cycle (Seconds): 3

## 2018-09-20 ENCOUNTER — HOSPITAL ENCOUNTER (OUTPATIENT)
Dept: LAB | Facility: MEDICAL CENTER | Age: 83
End: 2018-09-20
Attending: NURSE PRACTITIONER
Payer: MEDICARE

## 2018-09-20 DIAGNOSIS — N18.30 STAGE 3 CHRONIC KIDNEY DISEASE (HCC): ICD-10-CM

## 2018-09-20 LAB
ANION GAP SERPL CALC-SCNC: 6 MMOL/L (ref 0–11.9)
BUN SERPL-MCNC: 36 MG/DL (ref 8–22)
CALCIUM SERPL-MCNC: 10.1 MG/DL (ref 8.5–10.5)
CHLORIDE SERPL-SCNC: 100 MMOL/L (ref 96–112)
CO2 SERPL-SCNC: 29 MMOL/L (ref 20–33)
CREAT SERPL-MCNC: 1.26 MG/DL (ref 0.5–1.4)
GLUCOSE SERPL-MCNC: 81 MG/DL (ref 65–99)
POTASSIUM SERPL-SCNC: 4.8 MMOL/L (ref 3.6–5.5)
SODIUM SERPL-SCNC: 135 MMOL/L (ref 135–145)

## 2018-09-20 PROCEDURE — 80048 BASIC METABOLIC PNL TOTAL CA: CPT

## 2018-09-20 PROCEDURE — 36415 COLL VENOUS BLD VENIPUNCTURE: CPT

## 2018-10-03 ENCOUNTER — APPOINTMENT (OUTPATIENT)
Dept: NEPHROLOGY | Facility: MEDICAL CENTER | Age: 83
End: 2018-10-03
Payer: MEDICARE

## 2018-10-03 ENCOUNTER — PATIENT OUTREACH (OUTPATIENT)
Dept: HEALTH INFORMATION MANAGEMENT | Facility: OTHER | Age: 83
End: 2018-10-03

## 2018-10-03 NOTE — PROGRESS NOTES
Outcome:Declined AWV stated she sees her pcp regularly and declined to influenza Left Message    Please transfer to Patient Outreach Team at [phone] when patient returns call.    WebIZ Checked & Epic Updated:  no    [facility] Verified: yes  Effective Date: 1/1/2018     Attempt # 1  .ou

## 2018-10-08 ENCOUNTER — OFFICE VISIT (OUTPATIENT)
Dept: MEDICAL GROUP | Facility: PHYSICIAN GROUP | Age: 83
End: 2018-10-08
Payer: MEDICARE

## 2018-10-08 VITALS
WEIGHT: 143 LBS | TEMPERATURE: 98.4 F | OXYGEN SATURATION: 97 % | RESPIRATION RATE: 16 BRPM | SYSTOLIC BLOOD PRESSURE: 132 MMHG | HEART RATE: 54 BPM | DIASTOLIC BLOOD PRESSURE: 70 MMHG | BODY MASS INDEX: 26.31 KG/M2 | HEIGHT: 62 IN

## 2018-10-08 DIAGNOSIS — M54.50 CHRONIC BILATERAL LOW BACK PAIN WITHOUT SCIATICA: ICD-10-CM

## 2018-10-08 DIAGNOSIS — N18.30 STAGE 3 CHRONIC KIDNEY DISEASE (HCC): ICD-10-CM

## 2018-10-08 DIAGNOSIS — I10 ESSENTIAL HYPERTENSION: ICD-10-CM

## 2018-10-08 DIAGNOSIS — G89.29 CHRONIC BILATERAL LOW BACK PAIN WITHOUT SCIATICA: ICD-10-CM

## 2018-10-08 PROBLEM — Z91.148 CONTROLLED SUBSTANCE AGREEMENT TERMINATED: Status: RESOLVED | Noted: 2018-08-22 | Resolved: 2018-10-08

## 2018-10-08 PROCEDURE — 99214 OFFICE O/P EST MOD 30 MIN: CPT | Performed by: NURSE PRACTITIONER

## 2018-10-08 ASSESSMENT — PATIENT HEALTH QUESTIONNAIRE - PHQ9
2. FEELING DOWN, DEPRESSED, IRRITABLE, OR HOPELESS: NOT AT ALL
1. LITTLE INTEREST OR PLEASURE IN DOING THINGS: NOT AT ALL
6. FEELING BAD ABOUT YOURSELF - OR THAT YOU ARE A FAILURE OR HAVE LET YOURSELF OR YOUR FAMILY DOWN: NOT AL ALL
3. TROUBLE FALLING OR STAYING ASLEEP OR SLEEPING TOO MUCH: NOT AT ALL
SUM OF ALL RESPONSES TO PHQ QUESTIONS 1-9: 0
4. FEELING TIRED OR HAVING LITTLE ENERGY: NOT AT ALL
8. MOVING OR SPEAKING SO SLOWLY THAT OTHER PEOPLE COULD HAVE NOTICED. OR THE OPPOSITE, BEING SO FIGETY OR RESTLESS THAT YOU HAVE BEEN MOVING AROUND A LOT MORE THAN USUAL: NOT AT ALL
9. THOUGHTS THAT YOU WOULD BE BETTER OFF DEAD, OR OF HURTING YOURSELF: NOT AT ALL
SUM OF ALL RESPONSES TO PHQ9 QUESTIONS 1 AND 2: 0
5. POOR APPETITE OR OVEREATING: NOT AT ALL
7. TROUBLE CONCENTRATING ON THINGS, SUCH AS READING THE NEWSPAPER OR WATCHING TELEVISION: NOT AT ALL

## 2018-10-08 NOTE — PROGRESS NOTES
No chief complaint on file.        This is a 83 y.o.female patient that presents today with the following: Follow-up visit, review labs    Hypertension  This is a chronic condition, stable well-controlled on current medications including lisinopril 20 mg daily.  Blood pressure today is 132/70 and she denies symptoms of hypertension.  She is up-to-date with labs, she does not need refills at this time, but can call as needed.    CKD (chronic kidney disease)  This is a chronic condition, stable and improved since the last time she had labs drawn.  Creatinine has normalized and her GFR has increased to 40 from the low 30s.  She has been slowly decreasing the use of diclofenac and takes 1 pill every other day as needed for chronic upper back pain.  However she states she does not feel that she does not need this very often and will try to take only as needed perhaps every 2-3 days.  Her back pain worsens when she is at a desk is sewing or pain her bills and improves when she strains of her posture  She does have an upcoming appointment with nephrology for further evaluation.  We again discussed the importance of staying adequately hydrated, avoiding nephrotoxic medications as much as possible, keeping blood pressure under good control and avoiding excessive salt in her diet.    Chronic back pain  This is a chronic condition, stable and well controlled with as needed use of Voltaren 75 mg.  Her pain mostly occurs when she is at her desk sewing for long periods of time or sitting pain her bills.  Pain does improve when she stops the activity and puts heat to her upper back.      Hospital Outpatient Visit on 09/20/2018   Component Date Value   • Sodium 09/20/2018 135    • Potassium 09/20/2018 4.8    • Chloride 09/20/2018 100    • Co2 09/20/2018 29    • Glucose 09/20/2018 81    • Bun 09/20/2018 36*   • Creatinine 09/20/2018 1.26    • Calcium 09/20/2018 10.1    • Anion Gap 09/20/2018 6.0    • GFR If   09/20/2018 49*   • GFR If Non  [facility]* 09/20/2018 40*         clinical course has been stable    Past Medical History:   Diagnosis Date   • CLL (chronic lymphocytic leukemia) (HCC) 3/22/2016   • H/O scarlet fever     age 10   • HSV-1 infection    • HSV-2 infection    • Hyperlipidemia    • Hypertension        Past Surgical History:   Procedure Laterality Date   • ABDOMINAL HYSTERECTOMY TOTAL     • APPENDECTOMY     • KNEE ARTHROPLASTY TOTAL      right   • OPEN REDUCTION     • TONSILLECTOMY         Family History   Problem Relation Age of Onset   • Heart Disease Father    • Heart Attack Father    • Alcohol/Drug Father    • Cancer Sister         skin, not melanoma   • Diabetes Neg Hx    • Stroke Neg Hx        Patient has no known allergies.    Current Outpatient Prescriptions [facility]   Medication Sig Dispense Refill   • Misc. Devices Misc Incontinence devises, Prevail incontinence, pads 16 per box, unit 6 Units 3   • lisinopril (PRINIVIL) 20 MG Tab TAKE 1 TABLET BY MOUTH DAILY 90 Tab 0   • levothyroxine (SYNTHROID) 50 MCG Tab TAKE 1 TABLET BY MOUTH DAILY 90 Tab 1   • escitalopram (LEXAPRO) 10 MG Tab Take 1 Tab by mouth every day. 90 Tab 3   • timolol (TIMOPTIC) 0.5 % Solution      • cyclosporin (RESTASIS) 0.05 % ophthalmic emulsion Place 1 Drop in both eyes 2 times a day.     • ergocalciferol (DRISDOL) 83641 UNIT capsule Take 1 Cap by mouth every 7 days. 12 Cap 0   • Cyanocobalamin (VITAMIN B 12 PO) Take  by mouth.     • vitamin D (CHOLECALCIFEROL) 1000 UNIT Tab Take 4,000 Units by mouth every day.     • lysine 500 MG TABS Take 500 mg by mouth every day.     • TRAVATAN Z 0.004 % SOLN      • diclofenac EC (VOLTAREN) 75 MG Tablet Delayed Response TAKE 1 TABLET BY MOUTH TWO TIMES DAILY 180 Tab 0   • Acetaminophen (APAP) 325 MG Tab Take 1.5385-3.0769 Tabs by mouth every 6 hours as needed. 90 Tab 1   • acyclovir (ZOVIRAX) 5 % Ointment Apply 1 Application to affected area(s) every 3 hours. 1 Tube 6   •  "acetaminophen (TYLENOL) 500 MG Tab Take 1-2 Tabs by mouth 3 times a day as needed for Moderate Pain. 90 Tab 1     No current Epic-ordered facility-administered medications on file.        Constitutional ROS: No unexpected change in weight, No weakness, No unexplained fevers, sweats, or chills  Pulmonary ROS: No chronic cough, sputum, or hemoptysis, No shortness of breath, No recent change in breathing  Cardiovascular ROS: No chest pain, No edema, No palpitations, Positive for hypertension   Gastrointestinal ROS: No abdominal pain, No nausea, vomiting, diarrhea, or constipation  Musculoskeletal/Extremities ROS: positive per HPI  Neurologic ROS: Normal development, No seizures, No weakness  Genitourinary ROS: Positive per HPI    Physical exam:  /70 (BP Location: Right arm, Patient Position: Sitting, BP Cuff Size: Adult)   Pulse (!) 54   Temp 36.9 °C (98.4 °F) (Temporal)   Resp 16   Ht 1.575 m (5' 2\")   Wt 64.9 kg (143 lb)   SpO2 97%   BMI 26.16 kg/m²   General Appearance: Elderly female, alert, no distress, moderately overweight, well-groomed  Skin: Skin color, texture, turgor normal. No rashes or lesions.  Lungs: negative findings: normal respiratory rate and rhythm, normal effort  Musculoskeletal: positive findings: Mildly limited range of motion to thoracic spine due to pain  Neurologic: intact    Medical decision making/discussion: Patient to keep upcoming appointment with nephrology for further evaluation and treatment of chronic kidney disease.  I would like to see her back in 4 months, sooner if needed.  She does not need labs done before that visit but will order for the following visit.  She is to continue with the diclofenac only as needed, no more than 1 pill daily and try to take it only every couple of days.  She was again advised to stay well-hydrated, avoid nephrotoxic medications when possible, keep blood pressure under good control and avoid excessive salt in her diet.    Diagnoses and " all orders for this visit:    Essential hypertension    Stage 3 chronic kidney disease (HCC)    Chronic bilateral low back pain without sciatica          Please note that this dictation was created using voice recognition software. I have made every reasonable attempt to correct obvious errors, but I expect that there are errors of grammar and possibly content that I did not discover before finalizing the note.

## 2018-10-08 NOTE — ASSESSMENT & PLAN NOTE
This is a chronic condition, stable and improved since the last time she had labs drawn.  Creatinine has normalized and her GFR has increased to 40 from the low 30s.  She has been slowly decreasing the use of diclofenac and takes 1 pill every other day as needed for chronic upper back pain.  However she states she does not feel that she does not need this very often and will try to take only as needed perhaps every 2-3 days.  Her back pain worsens when she is at a desk is sewing or pain her bills and improves when she strains of her posture  She does have an upcoming appointment with nephrology for further evaluation.  We again discussed the importance of staying adequately hydrated, avoiding nephrotoxic medications as much as possible, keeping blood pressure under good control and avoiding excessive salt in her diet.

## 2018-10-08 NOTE — ASSESSMENT & PLAN NOTE
This is a chronic condition, stable and well controlled with as needed use of Voltaren 75 mg.  Her pain mostly occurs when she is at her desk sewing for long periods of time or sitting pain her bills.  Pain does improve when she stops the activity and puts heat to her upper back.

## 2018-10-08 NOTE — PATIENT INSTRUCTIONS
See me in 4 months, sooner if needed    Take the diclofenac every few days as needed    Will order labs at your next appt

## 2018-10-08 NOTE — ASSESSMENT & PLAN NOTE
This is a chronic condition, stable well-controlled on current medications including lisinopril 20 mg daily.  Blood pressure today is 132/70 and she denies symptoms of hypertension.  She is up-to-date with labs, she does not need refills at this time, but can call as needed.

## 2018-10-20 DIAGNOSIS — I10 ESSENTIAL HYPERTENSION: ICD-10-CM

## 2018-10-20 RX ORDER — LISINOPRIL 20 MG/1
TABLET ORAL
Refills: 0 | Status: CANCELLED | OUTPATIENT
Start: 2018-10-20

## 2018-10-20 RX ORDER — LEVOTHYROXINE SODIUM 0.05 MG/1
TABLET ORAL
Refills: 1 | Status: CANCELLED | OUTPATIENT
Start: 2018-10-20

## 2018-10-22 DIAGNOSIS — I10 ESSENTIAL HYPERTENSION: ICD-10-CM

## 2018-10-22 RX ORDER — LISINOPRIL 20 MG/1
TABLET ORAL
Qty: 90 TAB | Refills: 0 | Status: SHIPPED | OUTPATIENT
Start: 2018-10-22 | End: 2019-02-11 | Stop reason: SDUPTHER

## 2018-10-22 RX ORDER — LEVOTHYROXINE SODIUM 0.05 MG/1
TABLET ORAL
Qty: 90 TAB | Refills: 2 | Status: SHIPPED | OUTPATIENT
Start: 2018-10-22 | End: 2019-02-11 | Stop reason: SDUPTHER

## 2018-10-22 NOTE — TELEPHONE ENCOUNTER
Refill X 6 months, sent to pharmacy.Pt. Seen in the last 6 months per protocol.   Lab Results   Component Value Date/Time    SODIUM 135 09/20/2018 09:07 AM    POTASSIUM 4.8 09/20/2018 09:07 AM    CHLORIDE 100 09/20/2018 09:07 AM    CO2 29 09/20/2018 09:07 AM    GLUCOSE 81 09/20/2018 09:07 AM    BUN 36 (H) 09/20/2018 09:07 AM    CREATININE 1.26 09/20/2018 09:07 AM

## 2018-12-18 ENCOUNTER — TELEMEDICINE2 (OUTPATIENT)
Dept: NEPHROLOGY | Facility: MEDICAL CENTER | Age: 83
End: 2018-12-18
Payer: MEDICARE

## 2018-12-18 VITALS
BODY MASS INDEX: 26.13 KG/M2 | HEART RATE: 54 BPM | TEMPERATURE: 97.3 F | OXYGEN SATURATION: 93 % | HEIGHT: 62 IN | SYSTOLIC BLOOD PRESSURE: 122 MMHG | RESPIRATION RATE: 12 BRPM | WEIGHT: 142 LBS | DIASTOLIC BLOOD PRESSURE: 62 MMHG

## 2018-12-18 DIAGNOSIS — N18.30 STAGE 3 CHRONIC KIDNEY DISEASE (HCC): ICD-10-CM

## 2018-12-18 DIAGNOSIS — I10 ESSENTIAL HYPERTENSION: ICD-10-CM

## 2018-12-18 PROCEDURE — 99203 OFFICE O/P NEW LOW 30 MIN: CPT | Performed by: INTERNAL MEDICINE

## 2018-12-18 ASSESSMENT — ENCOUNTER SYMPTOMS
VOMITING: 0
COUGH: 0
HYPERTENSION: 1
CHILLS: 0
SHORTNESS OF BREATH: 0
FEVER: 0
NAUSEA: 0

## 2018-12-18 NOTE — PROGRESS NOTES
"Subjective:      JITENDRA Talley is a 84 y.o. female who presents with Chronic Kidney Disease and Hypertension            Chronic Kidney Disease   This is a chronic problem. The current episode started more than 1 year ago. The problem occurs constantly. The problem has been unchanged. Pertinent negatives include no chest pain, chills, coughing, fever, nausea, urinary symptoms or vomiting. Nothing aggravates the symptoms.   Hypertension   This is a chronic problem. The current episode started more than 1 year ago. The problem is unchanged. The problem is controlled. Pertinent negatives include no chest pain, malaise/fatigue, peripheral edema or shortness of breath. There are no associated agents to hypertension. Risk factors for coronary artery disease include post-menopausal state. Past treatments include ACE inhibitors. The current treatment provides significant improvement. There are no compliance problems.  Hypertensive end-organ damage includes kidney disease. Identifiable causes of hypertension include chronic renal disease.       Review of Systems   Constitutional: Negative for chills, fever and malaise/fatigue.   Respiratory: Negative for cough and shortness of breath.    Cardiovascular: Negative for chest pain and leg swelling.   Gastrointestinal: Negative for nausea and vomiting.   Genitourinary: Negative for dysuria, frequency and urgency.   All other systems reviewed and are negative.         Objective:     /62 (BP Location: Left arm, Patient Position: Sitting)   Pulse (!) 54   Temp 36.3 °C (97.3 °F)   Resp 12   Ht 1.575 m (5' 2\")   Wt 64.4 kg (142 lb)   SpO2 93%   BMI 25.97 kg/m²      Physical Exam   Constitutional: She is oriented to person, place, and time. She appears well-developed and well-nourished. No distress.   HENT:   Head: Normocephalic and atraumatic.   Right Ear: External ear normal.   Left Ear: External ear normal.   Nose: Nose normal.   Eyes: Conjunctivae are normal. Right eye " exhibits no discharge. Left eye exhibits no discharge. No scleral icterus.   Cardiovascular: Normal rate and regular rhythm.    Pulmonary/Chest: Effort normal and breath sounds normal. No respiratory distress.   Musculoskeletal: She exhibits no edema.   Neurological: She is alert and oriented to person, place, and time.   Skin: Skin is warm. She is not diaphoretic.   Psychiatric: She has a normal mood and affect. Her behavior is normal.   Nursing note and vitals reviewed.   exam was done by the help of the nurse at the remote facility.              Assessment/Plan:     1. Stage 3 chronic kidney disease (HCC)  Most likely hypertensive nephrosclerosis with age-related changes  No uremic symptoms  Renal dose of medication  Avoid nephrotoxins    2. Essential hypertension  Controlled  Continue low-sodium diet

## 2019-02-11 ENCOUNTER — OFFICE VISIT (OUTPATIENT)
Dept: MEDICAL GROUP | Facility: PHYSICIAN GROUP | Age: 84
End: 2019-02-11
Payer: MEDICARE

## 2019-02-11 VITALS
DIASTOLIC BLOOD PRESSURE: 68 MMHG | HEART RATE: 90 BPM | BODY MASS INDEX: 26.87 KG/M2 | TEMPERATURE: 98.4 F | RESPIRATION RATE: 16 BRPM | SYSTOLIC BLOOD PRESSURE: 122 MMHG | WEIGHT: 146 LBS | HEIGHT: 62 IN | OXYGEN SATURATION: 94 %

## 2019-02-11 DIAGNOSIS — Z11.59 NEED FOR HEPATITIS C SCREENING TEST: ICD-10-CM

## 2019-02-11 DIAGNOSIS — N18.30 STAGE 3 CHRONIC KIDNEY DISEASE (HCC): ICD-10-CM

## 2019-02-11 DIAGNOSIS — C91.10 CLL (CHRONIC LYMPHOCYTIC LEUKEMIA) (HCC): ICD-10-CM

## 2019-02-11 DIAGNOSIS — F41.9 ANXIETY: ICD-10-CM

## 2019-02-11 DIAGNOSIS — I10 ESSENTIAL HYPERTENSION: ICD-10-CM

## 2019-02-11 DIAGNOSIS — E03.9 HYPOTHYROIDISM, UNSPECIFIED TYPE: ICD-10-CM

## 2019-02-11 DIAGNOSIS — E78.5 HYPERLIPIDEMIA, UNSPECIFIED HYPERLIPIDEMIA TYPE: ICD-10-CM

## 2019-02-11 PROCEDURE — 8041 PR SCP AHA: Performed by: NURSE PRACTITIONER

## 2019-02-11 PROCEDURE — 99214 OFFICE O/P EST MOD 30 MIN: CPT | Performed by: NURSE PRACTITIONER

## 2019-02-11 RX ORDER — LISINOPRIL 20 MG/1
TABLET ORAL
Qty: 90 TAB | Refills: 3 | Status: SHIPPED | OUTPATIENT
Start: 2019-02-11 | End: 2019-05-09 | Stop reason: SDUPTHER

## 2019-02-11 RX ORDER — LEVOTHYROXINE SODIUM 0.05 MG/1
TABLET ORAL
Qty: 90 TAB | Refills: 3 | Status: SHIPPED | OUTPATIENT
Start: 2019-02-11

## 2019-02-11 NOTE — PROGRESS NOTES
Chief Complaint   Patient presents with   • Hypertension     6 month fv         This is a 84 y.o.female patient that presents today with the following: follow up    Hyperlipidemia  The ASCVD Risk score (Tipton MARY Jr., et al., 2013) failed to calculate for the following reasons:    The 2013 ASCVD risk score is only valid for ages 40 to 79  Pt declines statin, discussed importance of healthy diet.  She is not interested in starting statin therapy, she will continue to watch her diet.  Her most recent lab result is as follows:  Component      Latest Ref Rng & Units 7/27/2018           1:35 PM   Cholesterol,Tot      100 - 199 mg/dL 220 (H)   Triglycerides      0 - 149 mg/dL 106   HDL      >=40 mg/dL 53   LDL      <100 mg/dL 146 (H)       Hypertension  This is chronic, stable well-controlled on medications.  Blood pressure today is 122/68 and she denies symptoms of hypertension.  She is up-to-date with labs.    Hypothyroidism  This is chronic and stable, well controlled on current medications.  She does deny symptoms of hypothyroidism, tolerates medications well and understands to take this on an empty stomach first thing in the morning apart from her other medications.    Stage 3 chronic kidney disease (HCC)  This is chronic and stable.  We discussed again the importance of avoiding nephrotoxic medications, staying adequately hydrated, keeping her blood pressure under good control and avoiding excessive salt in her diet.  We will plan on rechecking labs, these have been ordered.    Anxiety  This is chronic and stable, currently on escitalopram 10 mg daily and tolerating this well.  She does not report any new or worsening symptoms.    CLL (chronic lymphocytic leukemia) (CMS-HCC)  Patient does have history of CLL and has previously been followed by oncology every 6 months.  At 1 of her last visit with me in the summer she was wanting to be seen only once a year, she was advised to discuss this with oncology, however she  never did make her follow-up appointment and she does not feel that she is to be seen for this anymore.  I did strongly encourage her to follow-up with oncology to discuss stopping ongoing follow-ups.      No visits with results within 1 Month(s) from this visit.   Latest known visit with results is:   Hospital Outpatient Visit on 09/20/2018   Component Date Value   • Sodium 09/20/2018 135    • Potassium 09/20/2018 4.8    • Chloride 09/20/2018 100    • Co2 09/20/2018 29    • Glucose 09/20/2018 81    • Bun 09/20/2018 36*   • Creatinine 09/20/2018 1.26    • Calcium 09/20/2018 10.1    • Anion Gap 09/20/2018 6.0    • GFR If  09/20/2018 49*   • GFR If Non  Ameri* 09/20/2018 40*         clinical course has been stable    Past Medical History:   Diagnosis Date   • CLL (chronic lymphocytic leukemia) (HCC) 3/22/2016   • H/O scarlet fever     age 10   • HSV-1 infection    • HSV-2 infection    • Hyperlipidemia    • Hypertension        Past Surgical History:   Procedure Laterality Date   • ABDOMINAL HYSTERECTOMY TOTAL     • APPENDECTOMY     • KNEE ARTHROPLASTY TOTAL      right   • OPEN REDUCTION     • TONSILLECTOMY         Family History   Problem Relation Age of Onset   • Heart Disease Father    • Heart Attack Father    • Alcohol/Drug Father    • Cancer Sister         skin, not melanoma   • Diabetes Neg Hx    • Stroke Neg Hx        Patient has no known allergies.    Current Outpatient Prescriptions Ordered in [facility]   Medication Sig Dispense Refill   • levothyroxine (SYNTHROID) 50 MCG Tab TAKE 1 TABLET BY MOUTH DAILY 90 Tab 3   • lisinopril (PRINIVIL) 20 MG Tab TAKE 1 TABLET BY MOUTH DAILY 90 Tab 3   • Misc. Devices Misc Incontinence devises, Prevail incontinence, pads 16 per box, unit 6 Units 3   • escitalopram (LEXAPRO) 10 MG Tab Take 1 Tab by mouth every day. 90 Tab 3   • acyclovir (ZOVIRAX) 5 % Ointment Apply 1 Application to affected area(s) every 3 hours. 1 Tube 6   • timolol (TIMOPTIC) 0.5 %  Solution      • cyclosporin (RESTASIS) 0.05 % ophthalmic emulsion Place 1 Drop in both eyes 2 times a day.     • ergocalciferol (DRISDOL) 43678 UNIT capsule Take 1 Cap by mouth every 7 days. 12 Cap 0   • Cyanocobalamin (VITAMIN B 12 PO) Take  by mouth.     • vitamin D (CHOLECALCIFEROL) 1000 UNIT Tab Take 4,000 Units by mouth every day.     • lysine 500 MG TABS Take 500 mg by mouth every day.     • TRAVATAN Z 0.004 % SOLN      • Acetaminophen (APAP) 325 MG Tab Take 1.5385-3.0769 Tabs by mouth every 6 hours as needed. 90 Tab 1   • acetaminophen (TYLENOL) 500 MG Tab Take 1-2 Tabs by mouth 3 times a day as needed for Moderate Pain. 90 Tab 1     No current Epic-ordered facility-administered medications on file.      Annual Health Assessment Questions:    1.  Are you currently engaging in any exercise or physical activity? Yes    2.  How would you describe your mood or emotional well-being today? fair    3.  Have you had any falls in the last year? No    4.  Have you noticed any problems with your balance or had difficulty walking? No    5.  In the last six months have you experienced any leakage of urine? No    6. DPA/Advanced Directive: Patient does not have an Advanced Directive.  A packet and workshop information was given on Advanced Directives.    Constitutional ROS: No unexpected change in weight, No weakness, No unexplained fevers, sweats, or chills  Pulmonary ROS: No chronic cough, sputum, or hemoptysis, No shortness of breath, No recent change in breathing  Cardiovascular ROS: No chest pain, No edema, No palpitations, Positive for hypertension   Musculoskeletal/Extremities ROS: No clubbing, No peripheral edema, No pain, redness or swelling on the joints  Neurologic ROS: Normal development, No seizures, No weakness  Psychiatric ROS: Positive for anxiety, depression   ROS: Positive per HPI  Endocrine ROS: Positive per HPI    Physical exam:  /68 (BP Location: Left arm, Patient Position: Sitting, BP Cuff  "Size: Adult long)   Pulse 90   Temp 36.9 °C (98.4 °F) (Temporal)   Resp 16   Ht 1.575 m (5' 2\")   Wt 66.2 kg (146 lb)   SpO2 94%   BMI 26.70 kg/m²   General Appearance: pleasant elderly female, alert, no distress, well nourished, well groomed  Skin: Skin color, texture, turgor normal. No rashes or lesions.  Lungs: negative findings: normal respiratory rate and rhythm, lungs clear to auscultation  Heart: negative. RRR without murmur, gallop, or rubs.  No ectopy.  Abdomen: Abdomen soft, non-tender. BS normal. No masses,  No organomegaly  Musculoskeletal: negative findings: ROM of all joints is normal, no evidence of joint instability, strength normal, no deformities present  Neurologic: intact    Medical decision making/discussion: pt to follow up in 6 months, but have labs done in March or April    JITENRDA was seen today for hypertension.    Diagnoses and all orders for this visit:    Stage 3 chronic kidney disease (HCC)    Essential hypertension  Comments:  Controlled, on lisinopril  Orders:  -     lisinopril (PRINIVIL) 20 MG Tab; TAKE 1 TABLET BY MOUTH DAILY    Hypothyroidism, unspecified type  -     levothyroxine (SYNTHROID) 50 MCG Tab; TAKE 1 TABLET BY MOUTH DAILY  -     TSH WITH REFLEX TO FT4; Future    Hyperlipidemia, unspecified hyperlipidemia type  -     Lipid Profile; Future    Anxiety    CLL (chronic lymphocytic leukemia) (HCC)    Need for hepatitis C screening test  -     HEP C VIRUS ANTIBODY; Future          Please note that this dictation was created using voice recognition software. I have made every reasonable attempt to correct obvious errors, but I expect that there are errors of grammar and possibly content that I did not discover before finalizing the note.        "

## 2019-02-11 NOTE — ASSESSMENT & PLAN NOTE
The ASCVD Risk score (Gordontho HERNANDEZ Jr., et al., 2013) failed to calculate for the following reasons:    The 2013 ASCVD risk score is only valid for ages 40 to 79  Pt declines statin, discussed importance of healthy diet.  She is not interested in starting statin therapy, she will continue to watch her diet.  Her most recent lab result is as follows:  Component      Latest Ref Rng & Units 7/27/2018           1:35 PM   Cholesterol,Tot      100 - 199 mg/dL 220 (H)   Triglycerides      0 - 149 mg/dL 106   HDL      >=40 mg/dL 53   LDL      <100 mg/dL 146 (H)

## 2019-02-12 NOTE — ASSESSMENT & PLAN NOTE
This is chronic and stable, well controlled on current medications.  She does deny symptoms of hypothyroidism, tolerates medications well and understands to take this on an empty stomach first thing in the morning apart from her other medications.

## 2019-02-12 NOTE — ASSESSMENT & PLAN NOTE
This is chronic and stable.  We discussed again the importance of avoiding nephrotoxic medications, staying adequately hydrated, keeping her blood pressure under good control and avoiding excessive salt in her diet.  We will plan on rechecking labs, these have been ordered.

## 2019-02-12 NOTE — ASSESSMENT & PLAN NOTE
Patient does have history of CLL and has previously been followed by oncology every 6 months.  At 1 of her last visit with me in the summer she was wanting to be seen only once a year, she was advised to discuss this with oncology, however she never did make her follow-up appointment and she does not feel that she is to be seen for this anymore.  I did strongly encourage her to follow-up with oncology to discuss stopping ongoing follow-ups.

## 2019-02-12 NOTE — ASSESSMENT & PLAN NOTE
This is chronic, stable well-controlled on medications.  Blood pressure today is 122/68 and she denies symptoms of hypertension.  She is up-to-date with labs.

## 2019-02-12 NOTE — ASSESSMENT & PLAN NOTE
This is chronic and stable, currently on escitalopram 10 mg daily and tolerating this well.  She does not report any new or worsening symptoms.

## 2019-03-18 DIAGNOSIS — F41.9 ANXIETY: ICD-10-CM

## 2019-03-19 NOTE — TELEPHONE ENCOUNTER
Was the patient seen in the last year in this department? Yes    Does patient have an active prescription for medications requested? No     Received Request Via: Pharmacy      Pt met protocol?: Yes    OV 2/19

## 2019-03-20 RX ORDER — ESCITALOPRAM OXALATE 10 MG/1
10 TABLET ORAL DAILY
Qty: 90 TAB | Refills: 1 | Status: SHIPPED | OUTPATIENT
Start: 2019-03-20 | End: 2019-07-11

## 2019-03-25 ENCOUNTER — HOSPITAL ENCOUNTER (OUTPATIENT)
Dept: LAB | Facility: MEDICAL CENTER | Age: 84
End: 2019-03-25
Attending: NURSE PRACTITIONER
Payer: MEDICARE

## 2019-03-25 DIAGNOSIS — E03.9 HYPOTHYROIDISM, UNSPECIFIED TYPE: ICD-10-CM

## 2019-03-25 DIAGNOSIS — F41.9 ANXIETY: ICD-10-CM

## 2019-03-25 DIAGNOSIS — Z11.59 NEED FOR HEPATITIS C SCREENING TEST: ICD-10-CM

## 2019-03-25 DIAGNOSIS — Z13.0 ENCOUNTER FOR SCREENING FOR HEMATOLOGIC DISORDER: ICD-10-CM

## 2019-03-25 DIAGNOSIS — E78.5 HYPERLIPIDEMIA, UNSPECIFIED HYPERLIPIDEMIA TYPE: ICD-10-CM

## 2019-03-25 LAB
ANISOCYTOSIS BLD QL SMEAR: ABNORMAL
BASOPHILS # BLD AUTO: 0 % (ref 0–1.8)
BASOPHILS # BLD: 0 K/UL (ref 0–0.12)
CHOLEST SERPL-MCNC: 190 MG/DL (ref 100–199)
EOSINOPHIL # BLD AUTO: 0 K/UL (ref 0–0.51)
EOSINOPHIL NFR BLD: 0 % (ref 0–6.9)
ERYTHROCYTE [DISTWIDTH] IN BLOOD BY AUTOMATED COUNT: 49.7 FL (ref 35.9–50)
FASTING STATUS PATIENT QL REPORTED: NORMAL
HCT VFR BLD AUTO: 40.2 % (ref 37–47)
HCV AB SER QL: REACTIVE
HDLC SERPL-MCNC: 51 MG/DL
HGB BLD-MCNC: 12.6 G/DL (ref 12–16)
LDLC SERPL CALC-MCNC: 115 MG/DL
LYMPHOCYTES # BLD AUTO: 22.35 K/UL (ref 1–4.8)
LYMPHOCYTES NFR BLD: 87.3 % (ref 22–41)
MACROCYTES BLD QL SMEAR: ABNORMAL
MANUAL DIFF BLD: NORMAL
MCH RBC QN AUTO: 31.3 PG (ref 27–33)
MCHC RBC AUTO-ENTMCNC: 31.3 G/DL (ref 33.6–35)
MCV RBC AUTO: 99.8 FL (ref 81.4–97.8)
MONOCYTES # BLD AUTO: 0.23 K/UL (ref 0–0.85)
MONOCYTES NFR BLD AUTO: 0.9 % (ref 0–13.4)
MORPHOLOGY BLD-IMP: NORMAL
NEUTROPHILS # BLD AUTO: 3.02 K/UL (ref 2–7.15)
NEUTROPHILS NFR BLD: 11.8 % (ref 44–72)
NRBC # BLD AUTO: 0 K/UL
NRBC BLD-RTO: 0 /100 WBC
PLATELET # BLD AUTO: 217 K/UL (ref 164–446)
PLATELET BLD QL SMEAR: NORMAL
PMV BLD AUTO: 10.8 FL (ref 9–12.9)
RBC # BLD AUTO: 4.03 M/UL (ref 4.2–5.4)
RBC BLD AUTO: PRESENT
SMUDGE CELLS BLD QL SMEAR: NORMAL
TRIGL SERPL-MCNC: 122 MG/DL (ref 0–149)
TSH SERPL DL<=0.005 MIU/L-ACNC: 3.05 UIU/ML (ref 0.38–5.33)
WBC # BLD AUTO: 25.6 K/UL (ref 4.8–10.8)

## 2019-03-25 PROCEDURE — 85007 BL SMEAR W/DIFF WBC COUNT: CPT

## 2019-03-25 PROCEDURE — 36415 COLL VENOUS BLD VENIPUNCTURE: CPT

## 2019-03-25 PROCEDURE — 84443 ASSAY THYROID STIM HORMONE: CPT

## 2019-03-25 PROCEDURE — 85027 COMPLETE CBC AUTOMATED: CPT

## 2019-03-25 PROCEDURE — 86803 HEPATITIS C AB TEST: CPT

## 2019-03-25 PROCEDURE — 87522 HEPATITIS C REVRS TRNSCRPJ: CPT

## 2019-03-25 PROCEDURE — 80061 LIPID PANEL: CPT

## 2019-03-26 ENCOUNTER — TELEPHONE (OUTPATIENT)
Dept: MEDICAL GROUP | Facility: PHYSICIAN GROUP | Age: 84
End: 2019-03-26

## 2019-03-26 NOTE — TELEPHONE ENCOUNTER
Patient called and LM stating that she needs a sooner appointment.     Phone Number Called: [phone]    Message: lvm for patient to call back    Left Message for patient to call back: N\A

## 2019-03-27 ENCOUNTER — OFFICE VISIT (OUTPATIENT)
Dept: MEDICAL GROUP | Facility: PHYSICIAN GROUP | Age: 84
End: 2019-03-27
Payer: MEDICARE

## 2019-03-27 ENCOUNTER — HOSPITAL ENCOUNTER (OUTPATIENT)
Facility: MEDICAL CENTER | Age: 84
End: 2019-03-27
Attending: NURSE PRACTITIONER
Payer: MEDICARE

## 2019-03-27 VITALS
OXYGEN SATURATION: 96 % | RESPIRATION RATE: 16 BRPM | HEART RATE: 65 BPM | HEIGHT: 62 IN | WEIGHT: 143 LBS | SYSTOLIC BLOOD PRESSURE: 122 MMHG | TEMPERATURE: 98.4 F | DIASTOLIC BLOOD PRESSURE: 72 MMHG | BODY MASS INDEX: 26.31 KG/M2

## 2019-03-27 DIAGNOSIS — R39.11 URINARY HESITANCY: ICD-10-CM

## 2019-03-27 DIAGNOSIS — R68.89 ABNORMAL GENITALIA: ICD-10-CM

## 2019-03-27 DIAGNOSIS — R30.0 DYSURIA: ICD-10-CM

## 2019-03-27 LAB
APPEARANCE UR: CLEAR
BILIRUB UR STRIP-MCNC: NEGATIVE MG/DL
COLOR UR AUTO: YELLOW
GLUCOSE UR STRIP.AUTO-MCNC: NEGATIVE MG/DL
KETONES UR STRIP.AUTO-MCNC: NEGATIVE MG/DL
LEUKOCYTE ESTERASE UR QL STRIP.AUTO: NORMAL
NITRITE UR QL STRIP.AUTO: NEGATIVE
PH UR STRIP.AUTO: 7 [PH] (ref 5–8)
PROT UR QL STRIP: NORMAL MG/DL
RBC UR QL AUTO: NEGATIVE
SP GR UR STRIP.AUTO: 1.02
UROBILINOGEN UR STRIP-MCNC: 0.2 MG/DL

## 2019-03-27 PROCEDURE — 87086 URINE CULTURE/COLONY COUNT: CPT

## 2019-03-27 PROCEDURE — 99214 OFFICE O/P EST MOD 30 MIN: CPT | Performed by: NURSE PRACTITIONER

## 2019-03-27 PROCEDURE — 81002 URINALYSIS NONAUTO W/O SCOPE: CPT | Performed by: NURSE PRACTITIONER

## 2019-03-27 RX ORDER — NITROFURANTOIN 25; 75 MG/1; MG/1
100 CAPSULE ORAL 2 TIMES DAILY
Qty: 10 CAP | Refills: 0 | Status: SHIPPED | OUTPATIENT
Start: 2019-03-27 | End: 2019-07-11

## 2019-03-27 NOTE — ASSESSMENT & PLAN NOTE
Pt has had these symptoms for the last couple of months.  She is reporting episodes of frequency, urgency and hesitancy.  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  She has never been seen by a uro-gynecologist for this, referral has been placed.  In the interim we will have her try Premarin cream, states that she has tried this before and did not find it helped but will try again.  She did have small amount of leukocytes in her urine, we will go ahead and treat empirically for UTI, she understands antibiotic may change due to the results of the culture and sensitivity.

## 2019-03-27 NOTE — PROGRESS NOTES
Chief Complaint   Patient presents with   • Other     vaginal swelling         This is a 84 y.o.female patient that presents today with the following: Vaginal swelling, dysuria    Dysuria  Pt has had these symptoms for the last couple of months.  She is reporting episodes of frequency, urgency and hesitancy.  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  She has never been seen by a uro-gynecologist for this, referral has been placed.  In the interim we will have her try Premarin cream, states that she has tried this before and did not find it helped but will try again.  She did have small amount of leukocytes in her urine, we will go ahead and treat empirically for UTI, she understands antibiotic may change due to the results of the culture and sensitivity.      Hospital Outpatient Visit on 03/25/2019   Component Date Value   • Hepatitis C Antibody 03/25/2019 Reactive*   • TSH 03/25/2019 3.050    • Cholesterol,Tot 03/25/2019 190    • Triglycerides 03/25/2019 122    • HDL 03/25/2019 51    • LDL 03/25/2019 115*   • Fasting Status 03/25/2019 Fasting    • WBC 03/25/2019 25.6*   • RBC 03/25/2019 4.03*   • Hemoglobin 03/25/2019 12.6    • Hematocrit 03/25/2019 40.2    • MCV 03/25/2019 99.8*   • MCH 03/25/2019 31.3    • MCHC 03/25/2019 31.3*   • RDW 03/25/2019 49.7    • Platelet Count 03/25/2019 217    • MPV 03/25/2019 10.8    • Neutrophils-Polys 03/25/2019 11.80*   • Lymphocytes 03/25/2019 87.30*   • Monocytes 03/25/2019 0.90    • Eosinophils 03/25/2019 0.00    • Basophils 03/25/2019 0.00    • Nucleated RBC 03/25/2019 0.00    • Neutrophils (Absolute) 03/25/2019 3.02    • Lymphs (Absolute) 03/25/2019 22.35*   • Monos (Absolute) 03/25/2019 0.23    • Eos (Absolute) 03/25/2019 0.00    • Baso (Absolute)  03/25/2019 0.00    • NRBC (Absolute) 03/25/2019 0.00    • Anisocytosis 03/25/2019 1+    • Macrocytosis 03/25/2019 1+    • Manual Diff Status 03/25/2019 PERFORMED    • Peripheral Smear Review 03/25/2019 see below    • Plt Estimation 03/25/2019 Normal    • RBC Morphology 03/25/2019 Present    • Smudge Cells 03/25/2019 Many          clinical course has been stable    Past Medical History:   Diagnosis Date   • CLL (chronic lymphocytic leukemia) (HCC) 3/22/2016   • H/O scarlet fever     age 10   • HSV-1 infection    • HSV-2 infection    • Hyperlipidemia    • Hypertension        Past Surgical History:   Procedure Laterality Date   • ABDOMINAL HYSTERECTOMY TOTAL     • APPENDECTOMY     • KNEE ARTHROPLASTY TOTAL      right   • OPEN REDUCTION     • TONSILLECTOMY         Family History   Problem Relation Age of Onset   • Heart Disease Father    • Heart Attack Father    • Alcohol/Drug Father    • Cancer Sister         skin, not melanoma   • Diabetes Neg Hx    • Stroke Neg Hx        Patient has no known allergies.    Current Outpatient Prescriptions Ordered in [facility]   Medication Sig Dispense Refill   • nitrofurantoin monohyd macro (MACROBID) 100 MG Cap Take 1 Cap by mouth 2 times a day. 10 Cap 0   • estrogens, conjugated (PREMARIN) 0.625 MG/GM Cream Apply to affected area twice a week 1 Tube 1   • escitalopram (LEXAPRO) 10 MG Tab TAKE 1 TAB BY MOUTH EVERY DAY. 90 Tab 1   • levothyroxine (SYNTHROID) 50 MCG Tab TAKE 1 TABLET BY MOUTH DAILY 90 Tab 3   • lisinopril (PRINIVIL) 20 MG Tab TAKE 1 TABLET BY MOUTH DAILY 90 Tab 3   • Misc. Devices Misc Incontinence devises, Prevail incontinence, pads 16 per box, unit 6 Units 3   • acyclovir (ZOVIRAX) 5 % Ointment Apply 1 Application to affected area(s) every 3 hours. 1 Tube 6   • timolol (TIMOPTIC) 0.5 % Solution      • cyclosporin (RESTASIS) 0.05 % ophthalmic emulsion Place 1 Drop in both eyes 2 times a day.     • Cyanocobalamin (VITAMIN B 12 PO) Take  by mouth.     • vitamin D  "(CHOLECALCIFEROL) 1000 UNIT Tab Take 4,000 Units by mouth every day.     • TRAVATAN Z 0.004 % SOLN      • Acetaminophen (APAP) 325 MG Tab Take 1.5385-3.0769 Tabs by mouth every 6 hours as needed. 90 Tab 1   • acetaminophen (TYLENOL) 500 MG Tab Take 1-2 Tabs by mouth 3 times a day as needed for Moderate Pain. 90 Tab 1   • ergocalciferol (DRISDOL) 59888 UNIT capsule Take 1 Cap by mouth every 7 days. 12 Cap 0   • lysine 500 MG TABS Take 500 mg by mouth every day.       No current [facility]-ordered facility-administered medications on file.        Constitutional ROS: No unexpected change in weight, No weakness, No unexplained fevers, sweats, or chills  Pulmonary ROS: No chronic cough, sputum, or hemoptysis, No shortness of breath, No recent change in breathing  Cardiovascular ROS: No chest pain  Musculoskeletal/Extremities ROS: No clubbing, No peripheral edema, No pain, redness or swelling on the joints  Neurologic ROS: Normal development, No seizures, No weakness   ROS: positive per HPI    Physical exam:  /72 (BP Location: Left arm, Patient Position: Sitting, BP Cuff Size: Adult)   Pulse 65   Temp 36.9 °C (98.4 °F) (Temporal)   Resp 16   Ht 1.575 m (5' 2\")   Wt 64.9 kg (143 lb)   SpO2 96%   BMI 26.16 kg/m²   General Appearance: alert, no distress, overweight, well groomed  Skin: Skin color, texture, turgor normal. No rashes or lesions.  Lungs: negative findings: normal respiratory rate and rhythm, lungs clear to auscultation  Abdomen: Abdomen soft, non-tender. BS normal. No masses,  No organomegaly  Musculoskeletal: negative findings: no evidence of joint instability, no evidence of muscle atrophy, no deformities present  Neurologic: intact  Pelvic: positive findings: fused labia minor, difficult to locate urethra    Medical decision making/discussion: Patient has been referred to urogynecology for further evaluation of congenital abnormality.  In the interim we will treat empirically for UTI.  She is to " follow-up with me at already scheduled appointment in July.    JITENDRA was seen today for other.    Diagnoses and all orders for this visit:    Dysuria  -     REFERRAL TO UROGYNECOLOGY  -     nitrofurantoin monohyd macro (MACROBID) 100 MG Cap; Take 1 Cap by mouth 2 times a day.  -     URINE CULTURE(NEW); Future  -     POCT Urinalysis    Urinary hesitancy  -     REFERRAL TO UROGYNECOLOGY  -     nitrofurantoin monohyd macro (MACROBID) 100 MG Cap; Take 1 Cap by mouth 2 times a day.  -     URINE CULTURE(NEW); Future  -     POCT Urinalysis    Abnormal genitalia  -     REFERRAL TO UROGYNECOLOGY    Other orders  -     estrogens, conjugated (PREMARIN) 0.625 MG/GM Cream; Apply to affected area twice a week        Return in about 3 months (around 6/27/2019) for Follow-up.        Please note that this dictation was created using voice recognition software. I have made every reasonable attempt to correct obvious errors, but I expect that there are errors of grammar and possibly content that I did not discover before finalizing the note.

## 2019-03-28 PROBLEM — R68.89 ABNORMAL GENITALIA: Status: ACTIVE | Noted: 2019-03-28

## 2019-03-29 LAB
BACTERIA UR CULT: NORMAL
HCV RNA SERPL NAA+PROBE-ACNC: NOT DETECTED IU/ML
HCV RNA SERPL NAA+PROBE-LOG IU: NOT DETECTED LOG IU/ML
HCV RNA SERPL QL NAA+PROBE: NOT DETECTED
SIGNIFICANT IND 70042: NORMAL
SITE SITE: NORMAL
SOURCE SOURCE: NORMAL

## 2019-04-06 DIAGNOSIS — M15.9 PRIMARY OSTEOARTHRITIS INVOLVING MULTIPLE JOINTS: ICD-10-CM

## 2019-04-08 NOTE — TELEPHONE ENCOUNTER
*Medication is currently D/C'd on patients med list*  Was the patient seen in the last year in this department? Yes    Does patient have an active prescription for medications requested? No     Received Request Via: Pharmacy    Pt met protocol?: Yes     Last OV 03/27/2019

## 2019-04-09 NOTE — TELEPHONE ENCOUNTER
Kely- Pt was d/c'd on med 12/18 during nephrology appt I'm assuming due to decreased renal function. I know pt is on because of osteoarthritis, so not sure if you would like to discuss with pt further for other options.

## 2019-04-10 ENCOUNTER — TELEPHONE (OUTPATIENT)
Dept: MEDICAL GROUP | Facility: PHYSICIAN GROUP | Age: 84
End: 2019-04-10

## 2019-04-10 NOTE — TELEPHONE ENCOUNTER
----- Message from DU Guardado sent at 4/10/2019 12:30 PM PDT -----  Please let pt know that the hep C test did show as reactive--meaning you could have possibly had a Hep C infection in the past. However the reflexive test done to check for current hep C infection is negative. The rest of her labs looked good. Please ask if she has been able to get into gyn yet.

## 2019-04-10 NOTE — TELEPHONE ENCOUNTER
Phone Number Called: [phone]    Message: Called and Spoke with Karla. They were notified regarding lab results and they verbally understood. Gyn appointment 5/23/19. patient would like to know how she possibly got HEP C in the past and why she doesn't have it now. Also, wants to know about all the flagged results (CBC, LDL) and what she can do.     Left Message for patient to call back: N\A

## 2019-04-11 NOTE — TELEPHONE ENCOUNTER
A lot of those flags are related to the CLL. LDL is only mildly elevated, due to age it is unclear whether or not there would be benefit to treating, especially with the fact that she does not have heart disease. I would not recommend treating it at this time  With regards to the positive Hep C: there are a number of ways she could have gotten it. The usual ways are unprotected intercourse, IV drug use, blood transfusion and lack of proper sterilization processes or reuse of medical equipment--which was common several decades ago. This could also be a false positive as well.  If she in fact was exposed and did have an infection, it likely cleared itself which it can do.

## 2019-04-11 NOTE — TELEPHONE ENCOUNTER
Phone Number Called: [phone]    Message: pt notified. Patient will discuss at next appointment regarding it    Left Message for patient to call back: N\A

## 2019-04-12 RX ORDER — ACYCLOVIR 50 MG/G
1 OINTMENT TOPICAL
Qty: 15 G | Refills: 2 | Status: SHIPPED | OUTPATIENT
Start: 2019-04-12

## 2019-04-12 NOTE — TELEPHONE ENCOUNTER
Was the patient seen in the last year in this department? Yes    Does patient have an active prescription for medications requested? No     Received Request Via: Pharmacy      Pt met protocol?: Yes, OV last month

## 2019-05-09 DIAGNOSIS — I10 ESSENTIAL HYPERTENSION: ICD-10-CM

## 2019-05-09 RX ORDER — LISINOPRIL 20 MG/1
TABLET ORAL
Qty: 100 TAB | Refills: 1 | Status: SHIPPED | OUTPATIENT
Start: 2019-05-09 | End: 2019-11-12

## 2019-05-21 RX ORDER — DICLOFENAC SODIUM 75 MG/1
TABLET, DELAYED RELEASE ORAL
Refills: 0 | OUTPATIENT
Start: 2019-05-21

## 2019-07-01 ENCOUNTER — GYNECOLOGY VISIT (OUTPATIENT)
Dept: OBGYN | Facility: CLINIC | Age: 84
End: 2019-07-01
Payer: MEDICARE

## 2019-07-01 VITALS
HEIGHT: 61 IN | BODY MASS INDEX: 26.62 KG/M2 | DIASTOLIC BLOOD PRESSURE: 60 MMHG | SYSTOLIC BLOOD PRESSURE: 112 MMHG | WEIGHT: 141 LBS

## 2019-07-01 DIAGNOSIS — Q52.5 FUSION OF VULVA: ICD-10-CM

## 2019-07-01 DIAGNOSIS — N36.8 URETHRAL OBSTRUCTION: ICD-10-CM

## 2019-07-01 DIAGNOSIS — N95.2 VAGINAL ATROPHY: ICD-10-CM

## 2019-07-01 DIAGNOSIS — R39.198 SLOW URINARY STREAM: ICD-10-CM

## 2019-07-01 PROCEDURE — 99203 OFFICE O/P NEW LOW 30 MIN: CPT | Performed by: OBSTETRICS & GYNECOLOGY

## 2019-07-01 RX ORDER — CYCLOSPORINE 0.5 MG/ML
1 EMULSION OPHTHALMIC 2 TIMES DAILY
COMMUNITY

## 2019-07-01 NOTE — PROGRESS NOTES
"Subjective:      Karla Talley is a 84 y.o. female who presents for Gynecologic Exam            HPI patient is an 84-year-old who presents today with labial problems and problems with urination.  Patient states she was recently seen by her PCP was unable to locate her ureteral opening.    Patient states she has had some difficulty urinating for several years the problem have been worsening.  She states that she sometimes will dribble some urine and sometimes when she changes position, bladder will completely empty on the floor.  She is able to empty her bladder every day but does not have a urinary stream that she can notice.    Patient states she has had labial fusion many years ago and does not remember what treatment she underwent.  Patient states she is unable to visualize her vulvar area so she cannot tell what is going on.  Patient states she had used estrogen cream in the past but cannot recall how long ago it was hoped frequently with duration she took estrogen cream.    Patient states she has had hysterectomy for benign condition many years ago does not recall when.  Patient states she is no longer doing mammograms.    ROS all organ systems were reviewed and were negative except for complaints in HPI       Objective:     /60 (BP Location: Left arm, Patient Position: Sitting)   Ht 1.549 m (5' 1\")   Wt 64 kg (141 lb)   BMI 26.64 kg/m²      Physical Exam   Constitutional: She appears well-developed and well-nourished. No distress.   HENT:   Head: Atraumatic.   Right Ear: External ear normal.   Eyes: Pupils are equal, round, and reactive to light. EOM are normal.   Neck: Normal range of motion. Neck supple. No thyromegaly present.   Cardiovascular: Normal rate and regular rhythm.  Exam reveals no gallop and no friction rub.    Pulmonary/Chest: Effort normal and breath sounds normal. No respiratory distress. She has no wheezes. Right breast exhibits no inverted nipple, no mass, no nipple discharge, no " skin change and no tenderness. Left breast exhibits no inverted nipple, no mass, no nipple discharge, no skin change and no tenderness.   Abdominal: Soft. Bowel sounds are normal. She exhibits no distension. There is no tenderness. There is no guarding.   Genitourinary:         Skin: She is not diaphoretic.   Nursing note and vitals reviewed.       Discussion:    I discussed with patient findings and exam of complete labial fusion and discussed need for estrogen cream therapy.  She is able to continue voiding daily.  We discussed long-term estrogen usage to maintain separation of the labia patient agrees.  She was instructed to take a small amount of estrogen cream to size of a green pea and applied the nightly reassess in 6 weeks.  Patient agrees with treatment after discussion of risks and benefits       Assessment/Plan:     1. Fusion of vulva  Patient counseled on treatment and desires to start estrogen therapy.  Patient was counseled on medication and potential risks and agrees for usage  - estrogens, conjugated (PREMARIN) 0.625 MG/GM Cream; Apply small amount to vulva nigntly  Dispense: 1 Tube; Refill: 2    2. Vaginal atrophy  We will try a course of vaginal estrogen  - estrogens, conjugated (PREMARIN) 0.625 MG/GM Cream; Apply small amount to vulva nigntly  Dispense: 1 Tube; Refill: 2    3. Urethral obstruction  Due to labial fusion. We will try a course of vaginal estrogen    4. Slow urinary stream  Due to labial fusion. We will try a course of vaginal estrogen    Precautions and plan of care reviewed    Patient to follow-up in 6 weeks for reevaluation.

## 2019-07-01 NOTE — PROGRESS NOTES
"GYN visit   Last annual Exam: 6 months ago seen by Kely Corbin  Last pap: \"I don't remember\"  Las mammogram: \"I don't remember\"  WT: 141 lb  BP: 112/60  Good # [phone]  "

## 2019-07-11 ENCOUNTER — OFFICE VISIT (OUTPATIENT)
Dept: MEDICAL GROUP | Facility: PHYSICIAN GROUP | Age: 84
End: 2019-07-11
Payer: MEDICARE

## 2019-07-11 VITALS
BODY MASS INDEX: 26.62 KG/M2 | SYSTOLIC BLOOD PRESSURE: 110 MMHG | DIASTOLIC BLOOD PRESSURE: 62 MMHG | WEIGHT: 141 LBS | TEMPERATURE: 97.6 F | OXYGEN SATURATION: 97 % | HEART RATE: 63 BPM | HEIGHT: 61 IN

## 2019-07-11 DIAGNOSIS — N18.30 STAGE 3 CHRONIC KIDNEY DISEASE (HCC): ICD-10-CM

## 2019-07-11 DIAGNOSIS — E03.9 HYPOTHYROIDISM, UNSPECIFIED TYPE: ICD-10-CM

## 2019-07-11 DIAGNOSIS — E55.9 VITAMIN D DEFICIENCY DISEASE: ICD-10-CM

## 2019-07-11 DIAGNOSIS — R68.89 ABNORMAL GENITALIA: ICD-10-CM

## 2019-07-11 DIAGNOSIS — I10 ESSENTIAL HYPERTENSION: ICD-10-CM

## 2019-07-11 PROCEDURE — 99214 OFFICE O/P EST MOD 30 MIN: CPT | Performed by: NURSE PRACTITIONER

## 2019-07-11 ASSESSMENT — PAIN SCALES - GENERAL: PAINLEVEL: NO PAIN

## 2019-07-11 NOTE — ASSESSMENT & PLAN NOTE
Chronic and stable, discussed the importance of adequate hydration, avoiding nephrotoxic medications and excessive salt in the diet and keeping blood pressure under good control.  We will continue to monitor this.  Labs have been stable, she is due for repeat labs before she follows up with me in 3 months.

## 2019-07-11 NOTE — ASSESSMENT & PLAN NOTE
Chronic, stable well-controlled on medications including lisinopril 20 mg daily.  Blood pressure today is well within normal limits, denies symptoms of hypertension.  Up-to-date with labs but will be due for repeat labs again in 3 months before she follows up with me.

## 2019-07-11 NOTE — ASSESSMENT & PLAN NOTE
Patient was seen in the spring of this year for symptoms associated with vulvar fusion and was referred to OB/GYN.  At her last visit with me she was prescribed Premarin cream but do not think she was taking consistently.  However OB/GYN did prescribe same medication and she has been using it consistently at bedtime, starting to notice improvement especially with her urinary symptoms.  She feels that her urinary stream is starting to improve.  She does have an upcoming follow-up appointment with OB/GYN at the beginning of August.

## 2019-07-11 NOTE — ASSESSMENT & PLAN NOTE
This is a chronic and stable condition, well-controlled on current dose of levothyroxine.  Patient does deny symptoms of hypothyroidism, tolerates medications well with no significant or bothersome side effects and understands to take this on an empty stomach first thing in the morning apart from her other medications.  Due for labs before next appointment.

## 2019-07-11 NOTE — PROGRESS NOTES
Chief Complaint   Patient presents with   • Follow-Up     Gynecologic Exam-Fusion of vulva         This is a 84 y.o.female patient that presents today with the following: Follow-up visit    Vitamin D deficiency disease  Patient has history of vitamin D deficiency, currently taking 1000 international units daily.  She will be due for labs before she follows up with me in 3 months.    Stage 3 chronic kidney disease (HCC)  Chronic and stable, discussed the importance of adequate hydration, avoiding nephrotoxic medications and excessive salt in the diet and keeping blood pressure under good control.  We will continue to monitor this.  Labs have been stable, she is due for repeat labs before she follows up with me in 3 months.        Hypothyroidism  This is a chronic and stable condition, well-controlled on current dose of levothyroxine.  Patient does deny symptoms of hypothyroidism, tolerates medications well with no significant or bothersome side effects and understands to take this on an empty stomach first thing in the morning apart from her other medications.  Due for labs before next appointment.    Hypertension  Chronic, stable well-controlled on medications including lisinopril 20 mg daily.  Blood pressure today is well within normal limits, denies symptoms of hypertension.  Up-to-date with labs but will be due for repeat labs again in 3 months before she follows up with me.    Abnormal genitalia  Patient was seen in the spring of this year for symptoms associated with vulvar fusion and was referred to OB/GYN.  At her last visit with me she was prescribed Premarin cream but do not think she was taking consistently.  However OB/GYN did prescribe same medication and she has been using it consistently at bedtime, starting to notice improvement especially with her urinary symptoms.  She feels that her urinary stream is starting to improve.  She does have an upcoming follow-up appointment with OB/GYN at the beginning  of August.      No visits with results within 1 Month(s) from this visit.   Latest known visit with results is:   Hospital Outpatient Visit on 03/27/2019   Component Date Value   • Significant Indicator 03/27/2019 NEG    • Source 03/27/2019 UR    • Site 03/27/2019 -    • Urine Culture 03/27/2019 Mixed skin esther 10-50,000 cfu/mL          clinical course has been stable    Past Medical History:   Diagnosis Date   • CLL (chronic lymphocytic leukemia) (HCC) 3/22/2016   • H/O scarlet fever     age 10   • HSV-1 infection    • HSV-2 infection    • Hyperlipidemia    • Hypertension        Past Surgical History:   Procedure Laterality Date   • ABDOMINAL HYSTERECTOMY TOTAL     • APPENDECTOMY     • KNEE ARTHROPLASTY TOTAL      right   • OPEN REDUCTION     • TONSILLECTOMY         Family History   Problem Relation Age of Onset   • Heart Disease Father    • Heart Attack Father    • Alcohol/Drug Father    • Cancer Sister         skin, not melanoma   • Diabetes Neg Hx    • Stroke Neg Hx        Patient has no known allergies.    Current Outpatient Prescriptions Ordered in [facility]   Medication Sig Dispense Refill   • cyclosporin (RESTASIS MULTIDOSE) 0.05 % ophthalmic emulsion Place 1 Drop in both eyes 2 times a day.     • estrogens, conjugated (PREMARIN) 0.625 MG/GM Cream Apply small amount to vulva nigntly 1 Tube 2   • lisinopril (PRINIVIL) 20 MG Tab TAKE 1 TABLET BY MOUTH DAILY 100 Tab 1   • acyclovir (ZOVIRAX) 5 % Ointment APPLY 1 APPLICATION TO AFFECTED AREA(S) EVERY 3 HOURS 15 g 2   • levothyroxine (SYNTHROID) 50 MCG Tab TAKE 1 TABLET BY MOUTH DAILY 90 Tab 3   • Misc. Devices Misc Incontinence devises, Prevail incontinence, pads 16 per box, unit 6 Units 3   • Acetaminophen (APAP) 325 MG Tab Take 1.5385-3.0769 Tabs by mouth every 6 hours as needed. 90 Tab 1   • timolol (TIMOPTIC) 0.5 % Solution      • acetaminophen (TYLENOL) 500 MG Tab Take 1-2 Tabs by mouth 3 times a day as needed for Moderate Pain. 90 Tab 1   • Cyanocobalamin  "(VITAMIN B 12 PO) Take  by mouth.     • vitamin D (CHOLECALCIFEROL) 1000 UNIT Tab Take 4,000 Units by mouth every day.     • lysine 500 MG TABS Take 500 mg by mouth every day.     • TRAVATAN Z 0.004 % SOLN        No current Epic-ordered facility-administered medications on file.        Constitutional ROS: No unexpected change in weight, No weakness, No unexplained fevers, sweats, or chills  Pulmonary ROS: No chronic cough, sputum, or hemoptysis, No shortness of breath, No recent change in breathing  Cardiovascular ROS: No chest pain, No edema, No palpitations, Positive for hypertension and hyperlipidemia  Musculoskeletal/Extremities ROS: No clubbing, No peripheral edema, No pain, redness or swelling on the joints  Neurologic ROS: Normal development, No seizures, No weakness   ROS: Positive per HPI  Endocrine ROS: Positive per HPI    Physical exam:  /62 (BP Location: Left arm, Patient Position: Sitting, BP Cuff Size: Adult)   Pulse 63   Temp 36.4 °C (97.6 °F) (Temporal)   Ht 1.549 m (5' 1\")   Wt 64 kg (141 lb)   SpO2 97%   Breastfeeding? No   BMI 26.64 kg/m²   General Appearance:  very pleasant elderly female alert, no distress, mildly overweight, well-groomed  Skin: Skin color, texture, turgor normal. No rashes or lesions.  Lungs: negative findings: normal respiratory rate and rhythm, normal effort  Musculoskeletal: negative findings: no evidence of joint instability, no evidence of muscle atrophy, no deformities present  Neurologic: intact    Medical decision making/discussion: Patient is going to continue care per OB/GYN for ongoing management of fused vulva, currently on hormone replacement therapy in cream form.  She is going to follow-up with me sometime in October with labs done before visit.  She can call for refills on medications as needed.    Karla was seen today for follow-up.    Diagnoses and all orders for this visit:    Abnormal genitalia    Stage 3 chronic kidney disease (HCC)  -     " CBC WITH DIFFERENTIAL; Future  -     Comp Metabolic Panel; Future    Essential hypertension  -     CBC WITH DIFFERENTIAL; Future  -     Comp Metabolic Panel; Future  -     Lipid Profile; Future    Hypothyroidism, unspecified type  -     TSH WITH REFLEX TO FT4; Future    Vitamin D deficiency disease  -     VITAMIN D,25 HYDROXY; Future        Return in about 3 months (around 10/11/2019) for Follow-up, Discuss Labs.        Please note that this dictation was created using voice recognition software. I have made every reasonable attempt to correct obvious errors, but I expect that there are errors of grammar and possibly content that I did not discover before finalizing the note.

## 2019-07-11 NOTE — ASSESSMENT & PLAN NOTE
Patient has history of vitamin D deficiency, currently taking 1000 international units daily.  She will be due for labs before she follows up with me in 3 months.

## 2019-08-06 ENCOUNTER — GYNECOLOGY VISIT (OUTPATIENT)
Dept: OBGYN | Facility: CLINIC | Age: 84
End: 2019-08-06
Payer: MEDICARE

## 2019-08-06 VITALS — BODY MASS INDEX: 26.83 KG/M2 | WEIGHT: 142 LBS | SYSTOLIC BLOOD PRESSURE: 126 MMHG | DIASTOLIC BLOOD PRESSURE: 72 MMHG

## 2019-08-06 DIAGNOSIS — R39.198 DIFFICULTY URINATING: ICD-10-CM

## 2019-08-06 DIAGNOSIS — Q52.5 LABIAL FUSION: ICD-10-CM

## 2019-08-06 PROCEDURE — 99213 OFFICE O/P EST LOW 20 MIN: CPT | Mod: 25 | Performed by: OBSTETRICS & GYNECOLOGY

## 2019-08-06 PROCEDURE — 59200 INSERT CERVICAL DILATOR: CPT | Performed by: OBSTETRICS & GYNECOLOGY

## 2019-08-06 NOTE — PROGRESS NOTES
Subjective:      Karla Talley is a 84 y.o. female who presents for f/u Gynecologic Exam            HPI patient is an 84-year-old who presents today for follow-up evaluation.  She was seen a month ago with complete labial fusion causing problems urinating.  At her previous visit no discernible separation was noted between the labia and the urinary tract could not be visualized due to total fusion.  She was started on nightly vulvar estrogen application which she applies with her finger and has had some improvement in her urination.  She denies any pain.  She reports some burning on urination once in a while but not persistently.  She denies any bleeding.    ROS all organ systems were reviewed and were negative except for complaints in HPI       Objective:     /72   Wt 64.4 kg (142 lb)   BMI 26.83 kg/m²      Physical Exam   Constitutional: She is oriented to person, place, and time. She appears well-developed and well-nourished. No distress.   Cardiovascular: Normal rate and regular rhythm. Exam reveals no gallop.   Pulmonary/Chest: Effort normal and breath sounds normal. No respiratory distress.   Abdominal: Soft. Bowel sounds are normal. She exhibits no distension.   Neurological: She is alert and oriented to person, place, and time.   Skin: Skin is warm and dry. No rash noted. She is not diaphoretic.   Psychiatric: She has a normal mood and affect. Her behavior is normal. Thought content normal.   Nursing note and vitals reviewed.       Procedure:    Patient was placed in stirrups and topical lidocaine gel was applied to the vulva.  There was a very tiny separation of the labia noted.  Dilators were used was serially dilated this area and I was able to dilate the vaginal opening to about 1 cm in the vaginal depth was sounded to 5 cm.  Patient was able to tolerate the procedure well without any bleeding or pain.       Assessment/Plan:     1. Labial fusion  84-year-old with previous complete fusion of the  labia.  Patient is responding well to vaginal estrogen/vulvar estrogen usage and will continue nightly application.  She was instructed to apply pressure to the vaginal opening with application of the estrogen cream to help manually separate the labia.  She will return in 1 month for reassessment and possible further vaginal dilation therapy    2. Difficulty urinating  Patient had difficulty with urinary stream due to labial fusion.  Symptoms are improving.  We will continue with treatment    Precautions and plan of care were discussed      To follow-up in 1 month for reassessment

## 2019-09-03 ENCOUNTER — HOSPITAL ENCOUNTER (OUTPATIENT)
Facility: MEDICAL CENTER | Age: 84
End: 2019-09-03
Attending: OBSTETRICS & GYNECOLOGY
Payer: MEDICARE

## 2019-09-03 ENCOUNTER — GYNECOLOGY VISIT (OUTPATIENT)
Dept: OBGYN | Facility: CLINIC | Age: 84
End: 2019-09-03
Payer: MEDICARE

## 2019-09-03 VITALS — DIASTOLIC BLOOD PRESSURE: 80 MMHG | BODY MASS INDEX: 26.45 KG/M2 | SYSTOLIC BLOOD PRESSURE: 120 MMHG | WEIGHT: 140 LBS

## 2019-09-03 DIAGNOSIS — Q52.5 LABIAL FUSION: ICD-10-CM

## 2019-09-03 DIAGNOSIS — R30.0 DYSURIA: ICD-10-CM

## 2019-09-03 DIAGNOSIS — R39.11 URINARY HESITANCY: ICD-10-CM

## 2019-09-03 LAB
APPEARANCE UR: NORMAL
BILIRUB UR STRIP-MCNC: NORMAL MG/DL
COLOR UR AUTO: NORMAL
GLUCOSE UR STRIP.AUTO-MCNC: NEGATIVE MG/DL
KETONES UR STRIP.AUTO-MCNC: NEGATIVE MG/DL
LEUKOCYTE ESTERASE UR QL STRIP.AUTO: NORMAL
NITRITE UR QL STRIP.AUTO: NEGATIVE
PH UR STRIP.AUTO: 5.5 [PH] (ref 5–8)
PROT UR QL STRIP: NEGATIVE MG/DL
RBC UR QL AUTO: NEGATIVE
SP GR UR STRIP.AUTO: 1.01
UROBILINOGEN UR STRIP-MCNC: NORMAL MG/DL

## 2019-09-03 PROCEDURE — 87086 URINE CULTURE/COLONY COUNT: CPT

## 2019-09-03 PROCEDURE — 99212 OFFICE O/P EST SF 10 MIN: CPT | Performed by: OBSTETRICS & GYNECOLOGY

## 2019-09-03 PROCEDURE — 81002 URINALYSIS NONAUTO W/O SCOPE: CPT | Performed by: OBSTETRICS & GYNECOLOGY

## 2019-09-03 NOTE — NON-PROVIDER
Pt here for f/u  Pt states has been waking up saturated a urine pad,  Good#[phone]  Pharmacy verified

## 2019-09-03 NOTE — PROGRESS NOTES
"Subjective:      Karla Talley is a 84 y.o. female who presents for GYN f/u            HPI patient is an 84-year-old who presents today for follow-up evaluation of complete labial fusion.  At her last visit, dilation was performed and patient was instructed to continue daily estrogen cream usage.  Patient states she was using the vaginal cream until this past Saturday when she woke up with excruciating vulvar pain and had pain there was significant for about 3 days.  Patient states she was told by a friend to apply \"butt paste\" to the area which she had applied and her pain has significantly improved.  She reports some burning once in a while when she urinates.  Patient has had a long history of urinary incontinence and since vaginal dilation procedure was performed and there is more labial separation, she is able to void more comfortably although she still have urinary incontinence episodes.  She denies any bleeding or discharge.  Denies any fevers.  Denies any abdominal pelvic pain.    ROS all organ systems were reviewed and were negative except for complaints in HPI       Objective:     /80   Wt 63.5 kg (140 lb)   BMI 26.45 kg/m²      Physical Exam   Constitutional: She is oriented to person, place, and time. She appears well-developed and well-nourished. No distress.   Genitourinary:   Genitourinary Comments: Small labial separation is still present on examination.  Urethra meatus is still not visualized.  There appears to have been a blister in the labia that may have ruptured and is now healing on the right side.  No sign of superficial infection is present.  Patient was instructed to continue to use her paste/ointment for the next 5 days and then she can restart a daily estrogen cream.  She will follow-up in 1 month for reevaluation and continuation of vulvar and vaginal dilation in office   Neurological: She is alert and oriented to person, place, and time. No sensory deficit.   Skin: She is not " diaphoretic.   Psychiatric: She has a normal mood and affect. Her behavior is normal. Thought content normal.   Nursing note and vitals reviewed.              Assessment/Plan:     1. Labial fusion  Patient is having some response to vaginal estrogen therapy.  She will continue vaginal estrogen therapy daily and follow-up in 1 month for reevaluation    2. Urinary hesitancy  Urinalysis is negative in office.  Urine culture sent    3. Dysuria  Patient has some burning in urination likely due to vulvar blister that is now healing.  - POCT Urinalysis  4.  Precautions and plan of care were reviewed.  And to follow-up in 4 weeks for reassessment and continuation of dilation therapy.

## 2019-09-04 LAB
AMBIGUOUS DTTM AMBI4: NORMAL
SIGNIFICANT IND 70042: NORMAL
SITE SITE: NORMAL
SOURCE SOURCE: NORMAL

## 2019-09-04 RX ORDER — LEVOTHYROXINE SODIUM 0.05 MG/1
TABLET ORAL
Refills: 2 | OUTPATIENT
Start: 2019-09-04

## 2019-09-06 ENCOUNTER — HOSPITAL ENCOUNTER (OUTPATIENT)
Dept: LAB | Facility: MEDICAL CENTER | Age: 84
End: 2019-09-06
Attending: NURSE PRACTITIONER
Payer: MEDICARE

## 2019-09-06 DIAGNOSIS — N18.30 STAGE 3 CHRONIC KIDNEY DISEASE (HCC): ICD-10-CM

## 2019-09-06 DIAGNOSIS — E03.9 HYPOTHYROIDISM, UNSPECIFIED TYPE: ICD-10-CM

## 2019-09-06 DIAGNOSIS — I10 ESSENTIAL HYPERTENSION: ICD-10-CM

## 2019-09-06 DIAGNOSIS — E55.9 VITAMIN D DEFICIENCY DISEASE: ICD-10-CM

## 2019-09-06 LAB
25(OH)D3 SERPL-MCNC: 41 NG/ML (ref 30–100)
ALBUMIN SERPL BCP-MCNC: 3.9 G/DL (ref 3.2–4.9)
ALBUMIN/GLOB SERPL: 1.2 G/DL
ALP SERPL-CCNC: 64 U/L (ref 30–99)
ALT SERPL-CCNC: 9 U/L (ref 2–50)
ANION GAP SERPL CALC-SCNC: 7 MMOL/L (ref 0–11.9)
AST SERPL-CCNC: 16 U/L (ref 12–45)
BACTERIA UR CULT: NORMAL
BASOPHILS # BLD AUTO: 0 % (ref 0–1.8)
BASOPHILS # BLD: 0 K/UL (ref 0–0.12)
BILIRUB SERPL-MCNC: 0.5 MG/DL (ref 0.1–1.5)
BUN SERPL-MCNC: 32 MG/DL (ref 8–22)
CALCIUM SERPL-MCNC: 9.7 MG/DL (ref 8.5–10.5)
CHLORIDE SERPL-SCNC: 101 MMOL/L (ref 96–112)
CHOLEST SERPL-MCNC: 184 MG/DL (ref 100–199)
CO2 SERPL-SCNC: 28 MMOL/L (ref 20–33)
CREAT SERPL-MCNC: 1.33 MG/DL (ref 0.5–1.4)
EOSINOPHIL # BLD AUTO: 0.24 K/UL (ref 0–0.51)
EOSINOPHIL NFR BLD: 0.9 % (ref 0–6.9)
ERYTHROCYTE [DISTWIDTH] IN BLOOD BY AUTOMATED COUNT: 47.2 FL (ref 35.9–50)
FASTING STATUS PATIENT QL REPORTED: NORMAL
GLOBULIN SER CALC-MCNC: 3.2 G/DL (ref 1.9–3.5)
GLUCOSE SERPL-MCNC: 95 MG/DL (ref 65–99)
HCT VFR BLD AUTO: 43.9 % (ref 37–47)
HDLC SERPL-MCNC: 56 MG/DL
HGB BLD-MCNC: 13.9 G/DL (ref 12–16)
LDLC SERPL CALC-MCNC: 100 MG/DL
LYMPHOCYTES # BLD AUTO: 23.71 K/UL (ref 1–4.8)
LYMPHOCYTES NFR BLD: 88.8 % (ref 22–41)
MANUAL DIFF BLD: NORMAL
MCH RBC QN AUTO: 31 PG (ref 27–33)
MCHC RBC AUTO-ENTMCNC: 31.7 G/DL (ref 33.6–35)
MCV RBC AUTO: 97.8 FL (ref 81.4–97.8)
MONOCYTES # BLD AUTO: 0.99 K/UL (ref 0–0.85)
MONOCYTES NFR BLD AUTO: 3.7 % (ref 0–13.4)
MORPHOLOGY BLD-IMP: NORMAL
NEUTROPHILS # BLD AUTO: 1.76 K/UL (ref 2–7.15)
NEUTROPHILS NFR BLD: 6.6 % (ref 44–72)
NRBC # BLD AUTO: 0 K/UL
NRBC BLD-RTO: 0 /100 WBC
PLATELET # BLD AUTO: 245 K/UL (ref 164–446)
PLATELET BLD QL SMEAR: NORMAL
PMV BLD AUTO: 12.1 FL (ref 9–12.9)
POTASSIUM SERPL-SCNC: 4.7 MMOL/L (ref 3.6–5.5)
PROT SERPL-MCNC: 7.1 G/DL (ref 6–8.2)
RBC # BLD AUTO: 4.49 M/UL (ref 4.2–5.4)
RBC BLD AUTO: PRESENT
SIGNIFICANT IND 70042: NORMAL
SITE SITE: NORMAL
SMUDGE CELLS BLD QL SMEAR: NORMAL
SODIUM SERPL-SCNC: 136 MMOL/L (ref 135–145)
SOURCE SOURCE: NORMAL
TRIGL SERPL-MCNC: 140 MG/DL (ref 0–149)
TSH SERPL DL<=0.005 MIU/L-ACNC: 3.31 UIU/ML (ref 0.38–5.33)
WBC # BLD AUTO: 26.7 K/UL (ref 4.8–10.8)

## 2019-09-06 PROCEDURE — 85007 BL SMEAR W/DIFF WBC COUNT: CPT

## 2019-09-06 PROCEDURE — 85027 COMPLETE CBC AUTOMATED: CPT

## 2019-09-06 PROCEDURE — 84443 ASSAY THYROID STIM HORMONE: CPT

## 2019-09-06 PROCEDURE — 80053 COMPREHEN METABOLIC PANEL: CPT

## 2019-09-06 PROCEDURE — 82306 VITAMIN D 25 HYDROXY: CPT

## 2019-09-06 PROCEDURE — 80061 LIPID PANEL: CPT

## 2019-09-06 PROCEDURE — 36415 COLL VENOUS BLD VENIPUNCTURE: CPT

## 2019-09-09 ENCOUNTER — OFFICE VISIT (OUTPATIENT)
Dept: MEDICAL GROUP | Facility: PHYSICIAN GROUP | Age: 84
End: 2019-09-09
Payer: MEDICARE

## 2019-09-09 VITALS
RESPIRATION RATE: 12 BRPM | HEIGHT: 61 IN | DIASTOLIC BLOOD PRESSURE: 68 MMHG | WEIGHT: 140.2 LBS | TEMPERATURE: 97.2 F | OXYGEN SATURATION: 95 % | HEART RATE: 95 BPM | BODY MASS INDEX: 26.47 KG/M2 | SYSTOLIC BLOOD PRESSURE: 100 MMHG

## 2019-09-09 DIAGNOSIS — F41.9 ANXIETY: ICD-10-CM

## 2019-09-09 PROCEDURE — 99214 OFFICE O/P EST MOD 30 MIN: CPT | Performed by: NURSE PRACTITIONER

## 2019-09-09 RX ORDER — ALPRAZOLAM 0.25 MG/1
0.25 TABLET ORAL NIGHTLY PRN
COMMUNITY

## 2019-09-09 RX ORDER — ESCITALOPRAM OXALATE 10 MG/1
10 TABLET ORAL DAILY
Qty: 90 TAB | Refills: 3 | Status: SHIPPED | OUTPATIENT
Start: 2019-09-09

## 2019-09-09 ASSESSMENT — PAIN SCALES - GENERAL: PAINLEVEL: NO PAIN

## 2019-09-09 NOTE — LETTER
[facility]  [facility]  [address] [place]  [place] [address]  Fax: [phone]   Authorization for Release/Disclosure of   Protected Health Information   Name: JITENDRA ROMERO : 1934 SSN: [id number]   Address: [facility] [address]  [place] [address] Phone:    [phone] (home)    I authorize the entity listed below to release/disclose the PHI below to:   [facility]   Provider or Entity Name: [facility]     Address [address] [place] [address], [place], [place] [address]   Phone:  [phone]    Fax:  [phone]   Reason for request: continuity of care   Information to be released:    [  ] LAST COLONOSCOPY,  including any PATH REPORT and follow-up  [  ] LAST FIT/COLOGUARD RESULT [  ] LAST DEXA  [  ] LAST MAMMOGRAM  [  ] LAST PAP  [  ] LAST LABS [  ] RETINA EXAM REPORT  [  ] IMMUNIZATION RECORDS  [  ] Release all info      [  ] Check here and initial the line next to each item to release ALL health information INCLUDING  _____ Care and treatment for drug and / or alcohol abuse  _____ HIV testing, infection status, or AIDS  _____ Genetic Testing    DATES OF SERVICE OR TIME PERIOD TO BE DISCLOSED: _____________  I understand and acknowledge that:  * This Authorization may be revoked at any time by you in writing, except if your health information has already been used or disclosed.  * Your health information that will be used or disclosed as a result of you signing this authorization could be re-disclosed by the recipient. If this occurs, your re-disclosed health information may no longer be protected by State or Federal laws.  * You may refuse to sign this Authorization. Your refusal will not affect your ability to obtain treatment.  * This Authorization becomes effective upon signing and will  on (date) __________.      If no date is indicated, this Authorization will  one (1) year from the signature date.     Name: Karla Talley    Signature:   Date:     9/9/2019       PLEASE FAX REQUESTED RECORDS BACK TO: [phone]

## 2019-09-10 NOTE — ASSESSMENT & PLAN NOTE
This is a chronic condition, recently exacerbated, was at the emergency room last night at [facility] for what sounds like panic attack, that was her discharge diagnosis.  She does have anxiety that is usually well controlled with lifestyle modifications, not currently on SSRI.  She did note that her symptoms worsened after 1 of her very good friends was diagnosed with breast cancer and has upcoming mastectomy scheduled.  She is very hesitant to take alprazolam but she did take 1 today and it helps with breakthrough anxiety.  She does understand the risks associated with chronic benzodiazepine use, she is agreeable to starting SSRI and will start escitalopram 10 mg daily.  She was advised to take alprazolam as prescribed in the emergency room only as needed and she was advised that she could even cut them in half to take 0.125 mg 1-2 times a day as needed.  She is going to follow-up with me at her [facility] scheduled appointment in October with labs done before visit.

## 2019-09-10 NOTE — PROGRESS NOTES
Chief Complaint   Patient presents with   • Follow-Up     [facility] Anxiety         This is a 84 y.o.female patient that presents today with the following: Post hospital follow-up, panic attack    Anxiety  This is a chronic condition, recently exacerbated, was at the emergency room last night at [facility] for what sounds like panic attack, that was her discharge diagnosis.  She does have anxiety that is usually well controlled with lifestyle modifications, not currently on SSRI.  She did note that her symptoms worsened after 1 of her very good friends was diagnosed with breast cancer and has upcoming mastectomy scheduled.  She is very hesitant to take alprazolam but she did take 1 today and it helps with breakthrough anxiety.  She does understand the risks associated with chronic benzodiazepine use, she is agreeable to starting SSRI and will start escitalopram 10 mg daily.  She was advised to take alprazolam as prescribed in the emergency room only as needed and she was advised that she could even cut them in half to take 0.125 mg 1-2 times a day as needed.  She is going to follow-up with me at her Crow scheduled appointment in October with labs done before visit.      Hospital Outpatient Visit on 09/06/2019   Component Date Value   • TSH 09/06/2019 3.310    • 25-Hydroxy   Vitamin D 25 09/06/2019 41    • Cholesterol,Tot 09/06/2019 184    • Triglycerides 09/06/2019 140    • HDL 09/06/2019 56    • LDL 09/06/2019 100*   • Sodium 09/06/2019 136    • Potassium 09/06/2019 4.7    • Chloride 09/06/2019 101    • Co2 09/06/2019 28    • Anion Gap 09/06/2019 7.0    • Glucose 09/06/2019 95    • Bun 09/06/2019 32*   • Creatinine 09/06/2019 1.33    • Calcium 09/06/2019 9.7    • AST(SGOT) 09/06/2019 16    • ALT(SGPT) 09/06/2019 9    • Alkaline Phosphatase 09/06/2019 64    • Total Bilirubin 09/06/2019 0.5    • Albumin 09/06/2019 3.9    • Total Protein 09/06/2019 7.1    • Globulin 09/06/2019 3.2    • A-G Ratio  09/06/2019 1.2    • WBC 09/06/2019 26.7*   • RBC 09/06/2019 4.49    • Hemoglobin 09/06/2019 13.9    • Hematocrit 09/06/2019 43.9    • MCV 09/06/2019 97.8    • MCH 09/06/2019 31.0    • MCHC 09/06/2019 31.7*   • RDW 09/06/2019 47.2    • Platelet Count 09/06/2019 245    • MPV 09/06/2019 12.1    • Neutrophils-Polys 09/06/2019 6.60*   • Lymphocytes 09/06/2019 88.80*   • Monocytes 09/06/2019 3.70    • Eosinophils 09/06/2019 0.90    • Basophils 09/06/2019 0.00    • Nucleated RBC 09/06/2019 0.00    • Neutrophils (Absolute) 09/06/2019 1.76*   • Lymphs (Absolute) 09/06/2019 23.71*   • Monos (Absolute) 09/06/2019 0.99*   • Eos (Absolute) 09/06/2019 0.24    • Baso (Absolute) 09/06/2019 0.00    • NRBC (Absolute) 09/06/2019 0.00    • Fasting Status 09/06/2019 Fasting    • GFR If  09/06/2019 46*   • GFR If Non  Ameri* 09/06/2019 38*   • Manual Diff Status 09/06/2019 PERFORMED    • Peripheral Smear Review 09/06/2019 see below    • Plt Estimation 09/06/2019 Normal    • RBC Morphology 09/06/2019 Present    • Smudge Cells 09/06/2019 Many    Hospital Outpatient Visit on 09/03/2019   Component Date Value   • Significant Indicator 09/03/2019 NEG    • Source 09/03/2019 UR    • Site 09/03/2019 -    • Culture Result 09/03/2019 Mixed skin esther 10-50,000 cfu/mL    • Significant Indicator 09/03/2019 .    • Source 09/03/2019 UR    • Site 09/03/2019 -    • Ambiguous Date/Time 09/03/2019                      Value:This specimen was received from your office without a  collection date and/or time. Collection date and/or time  defaulted to receipt date and/or time.     Gynecology Visit on 09/03/2019   Component Date Value   • POC Leukocyte Esterase 09/03/2019 Small    • POC Nitrites 09/03/2019 Negative    • POC Protein 09/03/2019 Negative    • POC Urine PH 09/03/2019 5.5    • POC Blood 09/03/2019 Negative    • POC Specific Gravity 09/03/2019 1.010    • POC Ketones 09/03/2019 negative    • POC Glucose 09/03/2019 Negative           clinical course has been stable    Past Medical History:   Diagnosis Date   • CLL (chronic lymphocytic leukemia) (HCC) 3/22/2016   • H/O scarlet fever     age 10   • HSV-1 infection    • HSV-2 infection    • Hyperlipidemia    • Hypertension        Past Surgical History:   Procedure Laterality Date   • ABDOMINAL HYSTERECTOMY TOTAL     • APPENDECTOMY     • KNEE ARTHROPLASTY TOTAL      right   • OPEN REDUCTION     • TONSILLECTOMY         Family History   Problem Relation Age of Onset   • Heart Disease Father    • Heart Attack Father    • Alcohol/Drug Father    • Cancer Sister         skin, not melanoma   • Diabetes Neg Hx    • Stroke Neg Hx        Patient has no known allergies.    Current Outpatient Medications Ordered in Epic   Medication Sig Dispense Refill   • ALPRAZolam (XANAX) 0.25 MG Tab Take 0.25 mg by mouth at bedtime as needed for Sleep.     • escitalopram (LEXAPRO) 10 MG Tab Take 1 Tab by mouth every day. 90 Tab 3   • cyclosporin (RESTASIS MULTIDOSE) 0.05 % ophthalmic emulsion Place 1 Drop in both eyes 2 times a day.     • estrogens, conjugated (PREMARIN) 0.625 MG/GM Cream Apply small amount to vulva nigntly 1 Tube 2   • lisinopril (PRINIVIL) 20 MG Tab TAKE 1 TABLET BY MOUTH DAILY 100 Tab 1   • acyclovir (ZOVIRAX) 5 % Ointment APPLY 1 APPLICATION TO AFFECTED AREA(S) EVERY 3 HOURS 15 g 2   • levothyroxine (SYNTHROID) 50 MCG Tab TAKE 1 TABLET BY MOUTH DAILY 90 Tab 3   • Acetaminophen (APAP) 325 MG Tab Take 1.5385-3.0769 Tabs by mouth every 6 hours as needed. 90 Tab 1   • timolol (TIMOPTIC) 0.5 % Solution      • acetaminophen (TYLENOL) 500 MG Tab Take 1-2 Tabs by mouth 3 times a day as needed for Moderate Pain. 90 Tab 1   • Cyanocobalamin (VITAMIN B 12 PO) Take  by mouth.     • vitamin D (CHOLECALCIFEROL) 1000 UNIT Tab Take 4,000 Units by mouth every day.     • lysine 500 MG TABS Take 500 mg by mouth every day.     • TRAVATAN Z 0.004 % SOLN      • Misc. Devices Misc Incontinence devises, Prevail  "incontinence, pads 16 per box, unit 6 Units 3     No current Epic-ordered facility-administered medications on file.        Constitutional ROS: No unexpected change in weight, No weakness, No unexplained fevers, sweats, or chills  Pulmonary ROS: No chronic cough, sputum, or hemoptysis, No shortness of breath, No recent change in breathing  Cardiovascular ROS: No chest pain  Musculoskeletal/Extremities ROS: No clubbing, No peripheral edema, No pain, redness or swelling on the joints  Neurologic ROS: Normal development, No seizures, No weakness  Psychiatric ROS: Positive per HPI    Physical exam:  /68 (BP Location: Left arm, Patient Position: Sitting, BP Cuff Size: Adult)   Pulse 95   Temp 36.2 °C (97.2 °F) (Temporal)   Resp 12   Ht 1.549 m (5' 1\")   Wt 63.6 kg (140 lb 3.2 oz)   SpO2 95%   Breastfeeding? No   BMI 26.49 kg/m²   General Appearance: Pleasant elderly female, alert, no distress, moderately overweight, well-groomed  Skin: Skin color, texture, turgor normal. No rashes or lesions.  Lungs: negative findings: normal respiratory rate and rhythm, normal effort  Musculoskeletal: negative findings: no evidence of joint instability, no evidence of muscle atrophy, no deformities present  Neurologic: intact    Medical decision making/discussion: Patient agrees to starting escitalopram 10 mg daily.  She can continue with alprazolam as prescribed in ER, advised to take this only as needed and she break these in half and take 0.125 mg as needed for increased anxiety or panic attack.  She is Crow scheduled to follow-up with me in October with labs and before visit.  We will attempt to get outside records from [facility] with her recent ER notes.    Karla was seen today for follow-up.    Diagnoses and all orders for this visit:    Anxiety  -     escitalopram (LEXAPRO) 10 MG Tab; Take 1 Tab by mouth every day.    Other orders  -     Obtain Results: Other (see comment); Obtain Results From: " [facility]        Return in about 6 weeks (around 10/21/2019) for Follow-up.        Please note that this dictation was created using voice recognition software. I have made every reasonable attempt to correct obvious errors, but I expect that there are errors of grammar and possibly content that I did not discover before finalizing the note.

## 2019-09-10 NOTE — PATIENT INSTRUCTIONS
Will have you start lexapro 10 mg daily, can continue to take the xanax as needed, can even cut the xanax in half    Keep upcoming appt with me in October

## 2019-10-01 DIAGNOSIS — Q52.5 FUSION OF VULVA: ICD-10-CM

## 2019-10-01 DIAGNOSIS — N95.2 VAGINAL ATROPHY: ICD-10-CM

## 2019-10-01 NOTE — PROGRESS NOTES
Refill request was submitted from [facility].  Her refill was filled on 10/1/1. Per physician note, patient to have reevaluation prior to refill.

## 2019-10-02 ENCOUNTER — GYNECOLOGY VISIT (OUTPATIENT)
Dept: OBGYN | Facility: CLINIC | Age: 84
End: 2019-10-02
Payer: MEDICARE

## 2019-10-02 DIAGNOSIS — N90.5 VULVAR ATROPHY: ICD-10-CM

## 2019-10-02 DIAGNOSIS — Q52.5 LABIAL FUSION: ICD-10-CM

## 2019-10-02 PROCEDURE — 99213 OFFICE O/P EST LOW 20 MIN: CPT | Performed by: OBSTETRICS & GYNECOLOGY

## 2019-10-04 NOTE — PROGRESS NOTES
Subjective:      Karla Talley is a 84 y.o. female who presents for follow-up evaluation and vaginal dilation            HPI patient is an 84-year-old who has been seen multiple times in my office for complete vulvar fusion.  She has been undergoing topical estrogen therapy nightly.  We had performed some degree of vaginal dilation in office previously and she presents today for follow-up treatment.  She requests refill of Premarin cream today.  She denies any pain bleeding or discharge.  Patient states she is able to urinate with more of a forceful urinary stream.  She does have urinary incontinence and wears incontinence pads    ROS all organ systems were reviewed and were negative except for complaints in HPI       Objective:     There were no vitals taken for this visit.     Physical Exam   Constitutional: She appears well-developed and well-nourished. No distress.   HENT:   Head: Normocephalic and atraumatic.   Neck: Normal range of motion. Neck supple. No thyromegaly present.   Cardiovascular: Normal rate, regular rhythm, normal heart sounds and intact distal pulses.   Pulmonary/Chest: Effort normal and breath sounds normal. No stridor. No respiratory distress. She has no wheezes.   Abdominal: Soft. Bowel sounds are normal. She exhibits no distension and no mass. There is no tenderness. There is no guarding.   Skin: She is not diaphoretic.   Nursing note and vitals reviewed.       Procedure:    Patient was placed in lithotomy position and exam was performed.  Clitoris was not visualized due to fusion and urethral meatus was not visualized.  There was only a 5 mm opening of the vaginal canal noted.  Topical lidocaine jelly was applied and a small set cervical dilators were used to progressively dilate the vaginal opening.  I was eventually able to insert my index finger into the vaginal canal after extensive dilation which patient tolerated well.  There were no bleeding noted.  Patient was instructed to  continue half a gram of estrogen cream nightly was instructed to apply to the vulva and insert a small pea-sized amount into the vaginal canal nightly.  She will follow-up in 1 month for continuation of dilation.  Patient agrees with treatment and plan       Assessment/Plan:     1. Vulvar atrophy  84-year-old with significant vulvar atrophy with good response to topical estrogen.  Patient agrees very continuation of estrogen usage and prescription was refilled.  - estrogens, conjugated (PREMARIN) 0.625 MG/GM Cream; Insert 0.5 g in vagina every day. Apply about half gram to vulva and in vagina nightly  Dispense: 1 Tube; Refill: 5    2. Labial fusion  Patient had complete labial fusion and after serial vaginal dilation therapy in my office, we are able to perform significant dilation of the vagina.  Patient will continue nightly Premarin cream therapy and will follow-up in 1 month for reassessment and possible further vaginal dilation in office.  - estrogens, conjugated (PREMARIN) 0.625 MG/GM Cream; Insert 0.5 g in vagina every day. Apply about half gram to vulva and in vagina nightly  Dispense: 1 Tube; Refill: 5    3.  Precautions and plan of care were discussed

## 2019-10-15 ENCOUNTER — OFFICE VISIT (OUTPATIENT)
Dept: MEDICAL GROUP | Facility: PHYSICIAN GROUP | Age: 84
End: 2019-10-15
Payer: MEDICARE

## 2019-10-15 VITALS
HEART RATE: 63 BPM | DIASTOLIC BLOOD PRESSURE: 72 MMHG | HEIGHT: 61 IN | SYSTOLIC BLOOD PRESSURE: 128 MMHG | RESPIRATION RATE: 18 BRPM | TEMPERATURE: 98 F | BODY MASS INDEX: 26.39 KG/M2 | WEIGHT: 139.8 LBS | OXYGEN SATURATION: 93 %

## 2019-10-15 DIAGNOSIS — I10 ESSENTIAL HYPERTENSION: ICD-10-CM

## 2019-10-15 DIAGNOSIS — E78.5 HYPERLIPIDEMIA, UNSPECIFIED HYPERLIPIDEMIA TYPE: ICD-10-CM

## 2019-10-15 DIAGNOSIS — N18.30 STAGE 3 CHRONIC KIDNEY DISEASE (HCC): ICD-10-CM

## 2019-10-15 DIAGNOSIS — E03.9 HYPOTHYROIDISM, UNSPECIFIED TYPE: ICD-10-CM

## 2019-10-15 DIAGNOSIS — E55.9 VITAMIN D DEFICIENCY DISEASE: ICD-10-CM

## 2019-10-15 PROCEDURE — 99214 OFFICE O/P EST MOD 30 MIN: CPT | Performed by: NURSE PRACTITIONER

## 2019-10-15 NOTE — PROGRESS NOTES
Chief Complaint   Patient presents with   • Hypothyroidism   • Anxiety         This is a 84 y.o.female patient that presents today with the following: Follow-up visit, review labs, discuss acute and chronic conditions    Hypertension  Chronic and stable, well controlled on medications including lisinopril, blood pressure within normal limits and she denies symptoms of hypertension.  She will be due for labs before she follows up with me in the spring 2020.    Hyperlipidemia  The ASCVD Risk score ([facility] MARY Jr, et al., 2013) failed to calculate.  Patient not currently on statin, her recent lipid profile is as follows:  Component      Latest Ref Rng & Units 9/6/2019           7:05 AM   Cholesterol,Tot      100 - 199 mg/dL 184   Triglycerides      0 - 149 mg/dL 140   HDL      >=40 mg/dL 56   LDL      <100 mg/dL 100 (H)   Patient prefers to stay off statin at this time.    Hypothyroidism  Chronic and stable, well controlled on levothyroxine 50 mcg daily.  Recent labs within normal limits.  She denies symptoms of hypothyroidism and understands to take this medication on an empty stomach first thing in the morning apart from other medications.    Stage 3 chronic kidney disease (HCC)  Chronic and stable, discussed the importance of adequate hydration, avoiding nephrotoxic medications and excessive salt in the diet and keeping blood pressure under good control.  We will continue to monitor this.  Labs have been stable, GFR has been running in the mid to high 30s to low 40s consistently.        Vitamin D deficiency disease  This is chronic and well-controlled with over-the-counter vitamin D supplement.  Recent vitamin D level was well within normal limits.      No visits with results within 1 Month(s) from this visit.   Latest known visit with results is:   Hospital Outpatient Visit on 09/06/2019   Component Date Value   • TSH 09/06/2019 3.310    • 25-Hydroxy   Vitamin D 25 09/06/2019 41    • Cholesterol,Tot 09/06/2019 184     • Triglycerides 09/06/2019 140    • HDL 09/06/2019 56    • LDL 09/06/2019 100*   • Sodium 09/06/2019 136    • Potassium 09/06/2019 4.7    • Chloride 09/06/2019 101    • Co2 09/06/2019 28    • Anion Gap 09/06/2019 7.0    • Glucose 09/06/2019 95    • Bun 09/06/2019 32*   • Creatinine 09/06/2019 1.33    • Calcium 09/06/2019 9.7    • AST(SGOT) 09/06/2019 16    • ALT(SGPT) 09/06/2019 9    • Alkaline Phosphatase 09/06/2019 64    • Total Bilirubin 09/06/2019 0.5    • Albumin 09/06/2019 3.9    • Total Protein 09/06/2019 7.1    • Globulin 09/06/2019 3.2    • A-G Ratio 09/06/2019 1.2    • WBC 09/06/2019 26.7*   • RBC 09/06/2019 4.49    • Hemoglobin 09/06/2019 13.9    • Hematocrit 09/06/2019 43.9    • MCV 09/06/2019 97.8    • MCH 09/06/2019 31.0    • MCHC 09/06/2019 31.7*   • RDW 09/06/2019 47.2    • Platelet Count 09/06/2019 245    • MPV 09/06/2019 12.1    • Neutrophils-Polys 09/06/2019 6.60*   • Lymphocytes 09/06/2019 88.80*   • Monocytes 09/06/2019 3.70    • Eosinophils 09/06/2019 0.90    • Basophils 09/06/2019 0.00    • Nucleated RBC 09/06/2019 0.00    • Neutrophils (Absolute) 09/06/2019 1.76*   • Lymphs (Absolute) 09/06/2019 23.71*   • Monos (Absolute) 09/06/2019 0.99*   • Eos (Absolute) 09/06/2019 0.24    • Baso (Absolute) 09/06/2019 0.00    • NRBC (Absolute) 09/06/2019 0.00    • Fasting Status 09/06/2019 Fasting    • GFR If  09/06/2019 46*   • GFR If Non  Ameri* 09/06/2019 38*   • Manual Diff Status 09/06/2019 PERFORMED    • Peripheral Smear Review 09/06/2019 see below    • Plt Estimation 09/06/2019 Normal    • RBC Morphology 09/06/2019 Present    • Smudge Cells 09/06/2019 Many          clinical course has been stable    Past Medical History:   Diagnosis Date   • CLL (chronic lymphocytic leukemia) (HCC) 3/22/2016   • H/O scarlet fever     age 10   • HSV-1 infection    • HSV-2 infection    • Hyperlipidemia    • Hypertension        Past Surgical History:   Procedure Laterality Date   • ABDOMINAL  HYSTERECTOMY TOTAL     • APPENDECTOMY     • KNEE ARTHROPLASTY TOTAL      right   • OPEN REDUCTION     • TONSILLECTOMY         Family History   Problem Relation Age of Onset   • Heart Disease Father    • Heart Attack Father    • Alcohol/Drug Father    • Cancer Sister         skin, not melanoma   • Diabetes Neg Hx    • Stroke Neg Hx        Patient has no known allergies.    Current Outpatient Medications Ordered in Epic   Medication Sig Dispense Refill   • estrogens, conjugated (PREMARIN) 0.625 MG/GM Cream Insert 0.5 g in vagina every day. Apply about half gram to vulva and in vagina nightly 1 Tube 5   • ALPRAZolam (XANAX) 0.25 MG Tab Take 0.25 mg by mouth at bedtime as needed for Sleep.     • escitalopram (LEXAPRO) 10 MG Tab Take 1 Tab by mouth every day. 90 Tab 3   • cyclosporin (RESTASIS MULTIDOSE) 0.05 % ophthalmic emulsion Place 1 Drop in both eyes 2 times a day.     • estrogens, conjugated (PREMARIN) 0.625 MG/GM Cream Apply small amount to vulva nigntly 1 Tube 2   • lisinopril (PRINIVIL) 20 MG Tab TAKE 1 TABLET BY MOUTH DAILY 100 Tab 1   • acyclovir (ZOVIRAX) 5 % Ointment APPLY 1 APPLICATION TO AFFECTED AREA(S) EVERY 3 HOURS 15 g 2   • levothyroxine (SYNTHROID) 50 MCG Tab TAKE 1 TABLET BY MOUTH DAILY 90 Tab 3   • Misc. Devices Misc Incontinence devises, Prevail incontinence, pads 16 per box, unit 6 Units 3   • Acetaminophen (APAP) 325 MG Tab Take 1.5385-3.0769 Tabs by mouth every 6 hours as needed. 90 Tab 1   • timolol (TIMOPTIC) 0.5 % Solution      • acetaminophen (TYLENOL) 500 MG Tab Take 1-2 Tabs by mouth 3 times a day as needed for Moderate Pain. 90 Tab 1   • Cyanocobalamin (VITAMIN B 12 PO) Take  by mouth.     • vitamin D (CHOLECALCIFEROL) 1000 UNIT Tab Take 4,000 Units by mouth every day.     • lysine 500 MG TABS Take 500 mg by mouth every day.     • TRAVATAN Z 0.004 % SOLN        No current Epic-ordered facility-administered medications on file.        Constitutional ROS: No unexpected change in weight,  "No weakness, No unexplained fevers, sweats, or chills  Pulmonary ROS: No chronic cough, sputum, or hemoptysis, No shortness of breath, No recent change in breathing  Cardiovascular ROS: No chest pain, No edema, No palpitations, Positive for hypertension and hyperlipidemia  Gastrointestinal ROS: No abdominal pain, No nausea, vomiting, diarrhea, or constipation  Musculoskeletal/Extremities ROS: No clubbing, No peripheral edema, No pain, redness or swelling on the joints  Neurologic ROS: Normal development, No seizures, No weakness  Psychiatric ROS: Positive for anxiety  Endocrine ROS: Positive per HPI   ROS: Positive per HPI    Physical exam:  /72 (BP Location: Right arm, Patient Position: Sitting)   Pulse 63   Temp 36.7 °C (98 °F) (Temporal)   Resp 18   Ht 1.549 m (5' 1\")   Wt 63.4 kg (139 lb 12.8 oz)   SpO2 93%   BMI 26.41 kg/m²   General Appearance: Very pleasant elderly female, alert, no distress, moderately overweight, well-groomed  Skin: Skin color, texture, turgor normal. No rashes or lesions.  Lungs: negative findings: normal respiratory rate and rhythm, lungs clear to auscultation  Heart: negative. RRR without murmur, gallop, or rubs.  No ectopy.  Abdomen: Abdomen soft, non-tender. BS normal. No masses,  No organomegaly  Musculoskeletal: negative findings: no evidence of joint instability, no evidence of muscle atrophy, no deformities present  Neurologic: intact, CN II through XII grossly intact    Medical decision making/discussion: Patient is going to follow-up with me in April with labs done before that visit.  She is to continue on all of her medications as prescribed and call for refills as needed.    Karla was seen today for hypothyroidism and anxiety.    Diagnoses and all orders for this visit:    Hypothyroidism, unspecified type  -     TSH WITH REFLEX TO FT4; Future    Essential hypertension  -     CBC WITH DIFFERENTIAL; Future  -     Lipid Profile; Future  -     Comp Metabolic Panel; " Future    Vitamin D deficiency disease  -     VITAMIN D,25 HYDROXY; Future    Stage 3 chronic kidney disease (HCC)  -     Comp Metabolic Panel; Future    Hyperlipidemia, unspecified hyperlipidemia type  -     Lipid Profile; Future  -     Comp Metabolic Panel; Future        Return in about 6 months (around 4/15/2020) for Follow-up, Discuss Labs.        Please note that this dictation was created using voice recognition software. I have made every reasonable attempt to correct obvious errors, but I expect that there are errors of grammar and possibly content that I did not discover before finalizing the note.

## 2019-10-15 NOTE — PATIENT INSTRUCTIONS
Labs in march, then see me in April    Continue on your medication and call for refills as needed

## 2019-10-16 NOTE — ASSESSMENT & PLAN NOTE
Chronic and stable, discussed the importance of adequate hydration, avoiding nephrotoxic medications and excessive salt in the diet and keeping blood pressure under good control.  We will continue to monitor this.  Labs have been stable, GFR has been running in the mid to high 30s to low 40s consistently.

## 2019-10-16 NOTE — ASSESSMENT & PLAN NOTE
This is chronic and well-controlled with over-the-counter vitamin D supplement.  Recent vitamin D level was well within normal limits.

## 2019-10-16 NOTE — ASSESSMENT & PLAN NOTE
Chronic and stable, well controlled on levothyroxine 50 mcg daily.  Recent labs within normal limits.  She denies symptoms of hypothyroidism and understands to take this medication on an empty stomach first thing in the morning apart from other medications.

## 2019-10-16 NOTE — ASSESSMENT & PLAN NOTE
The ASCVD Risk score ([place] MARY Jr, et al., 2013) failed to calculate.  Patient not currently on statin, her recent lipid profile is as follows:  Component      Latest Ref Rng & Units 9/6/2019           7:05 AM   Cholesterol,Tot      100 - 199 mg/dL 184   Triglycerides      0 - 149 mg/dL 140   HDL      >=40 mg/dL 56   LDL      <100 mg/dL 100 (H)   Patient prefers to stay off statin at this time.

## 2019-10-16 NOTE — ASSESSMENT & PLAN NOTE
Chronic and stable, well controlled on medications including lisinopril, blood pressure within normal limits and she denies symptoms of hypertension.  She will be due for labs before she follows up with me in the spring 2020.

## 2019-11-05 ENCOUNTER — GYNECOLOGY VISIT (OUTPATIENT)
Dept: OBGYN | Facility: CLINIC | Age: 84
End: 2019-11-05
Payer: MEDICARE

## 2019-11-05 VITALS — DIASTOLIC BLOOD PRESSURE: 78 MMHG | SYSTOLIC BLOOD PRESSURE: 122 MMHG | WEIGHT: 140 LBS | BODY MASS INDEX: 26.45 KG/M2

## 2019-11-05 DIAGNOSIS — N95.2 VAGINAL ATROPHY: ICD-10-CM

## 2019-11-05 DIAGNOSIS — N39.3 STRESS INCONTINENCE OF URINE: ICD-10-CM

## 2019-11-05 DIAGNOSIS — Q52.5 LABIAL FUSION: ICD-10-CM

## 2019-11-05 PROCEDURE — 99213 OFFICE O/P EST LOW 20 MIN: CPT | Performed by: OBSTETRICS & GYNECOLOGY

## 2019-11-05 RX ORDER — LIDOCAINE HYDROCHLORIDE 20 MG/ML
JELLY TOPICAL
Qty: 1 TUBE | Refills: 3 | Status: SHIPPED | OUTPATIENT
Start: 2019-11-05

## 2019-11-05 NOTE — PROGRESS NOTES
Subjective:      Karla Talley is a 84 y.o. female who presents for gyn f/u            HPI patient is an 84-year-old who presents today for follow-up evaluation and treatment for labial fusion and vulvar /vaginal atrophy.  She is doing well today without any new complaints.  She is using vaginal estrogen at home and we have discussed vaginal dilation at home which she was not able to do consistently.  She states since her last visit and via vaginal dilation, she is able to urinate with a full stream.  She denies any vaginal bleeding pain or discharge.  She denies any fevers or dysuria    ROS all organ systems were reviewed and are currently negative except for complaints in HPI       Objective:     /78   Wt 63.5 kg (140 lb)   BMI 26.45 kg/m²      Physical Exam  Vitals signs and nursing note reviewed. Exam conducted with a chaperone present.   Constitutional:       Appearance: Normal appearance.   Cardiovascular:      Rate and Rhythm: Normal rate and regular rhythm.   Pulmonary:      Effort: Pulmonary effort is normal. No respiratory distress.      Breath sounds: Normal breath sounds.   Abdominal:      General: Abdomen is flat. Bowel sounds are normal.      Palpations: Abdomen is soft.   Neurological:      Mental Status: She is alert.          Procedure:  Patient was placed in lithotomy position and examined.  Vaginal introitus is patent but has closed up a little compared to prior visit and evaluation.  Lidocaine jelly was applied and the vaginal opening was serially dilated until I was able to insert my index finger fully into the vaginal canal with use of lubrication.  Patient tolerated procedure well.  No bleeding was present.  Patient was instructed to use her digit with lubrication daily to keep vaginal introitus patent and will continue estrogen usage.  We can reevaluate in 4 to 6 months.       Assessment/Plan:       1. Labial fusion  Patient has good response to vaginal estrogen cream and manual  dilation.  She will continue with dilation at home using lubricated fingers and will continue estrogen  - lidocaine (GLYDO/URO-JET) 2 % Gel; Apply a thin film to the vaginal opening prior to vaginal dilation  Dispense: 1 Tube; Refill: 3    2. Vaginal atrophy  Patient will continue estrogen usage with vaginal dilator.  Lidocaine jelly will be used if patient has discomfort during vaginal dilation  - lidocaine (GLYDO/URO-JET) 2 % Gel; Apply a thin film to the vaginal opening prior to vaginal dilation  Dispense: 1 Tube; Refill: 3    3. Stress incontinence of urine  Symptoms have improved significantly after vaginal dilation.  Patient is able to void with a stream.    Precautions and plan of care were reviewed    Patient to follow-up in 4 to 6 months for reevaluation

## 2019-11-11 DIAGNOSIS — I10 ESSENTIAL HYPERTENSION: ICD-10-CM

## 2019-11-12 RX ORDER — LISINOPRIL 20 MG/1
TABLET ORAL
Qty: 90 TAB | Refills: 1 | Status: SHIPPED | OUTPATIENT
Start: 2019-11-12

## 2019-11-12 NOTE — TELEPHONE ENCOUNTER
Refill X 6 months, sent to pharmacy.Pt. Seen in the last 6 months per protocol.   Lab Results   Component Value Date/Time    SODIUM 136 09/06/2019 07:05 AM    POTASSIUM 4.7 09/06/2019 07:05 AM    CHLORIDE 101 09/06/2019 07:05 AM    CO2 28 09/06/2019 07:05 AM    GLUCOSE 95 09/06/2019 07:05 AM    BUN 32 (H) 09/06/2019 07:05 AM    CREATININE 1.33 09/06/2019 07:05 AM

## 2019-11-12 NOTE — TELEPHONE ENCOUNTER
Was the patient seen in the last year in this department? Yes    Does patient have an active prescription for medications requested? No     Received Request Via: Pharmacy      Pt met protocol?: Yes, ov 10/19 bp 122/78

## 2021-01-11 DIAGNOSIS — Z23 NEED FOR VACCINATION: ICD-10-CM

## 2021-02-22 ENCOUNTER — HOSPITAL ENCOUNTER (OUTPATIENT)
Dept: LAB | Facility: MEDICAL CENTER | Age: 86
End: 2021-02-22
Attending: INTERNAL MEDICINE
Payer: MEDICARE

## 2021-02-22 LAB
FORWARD REASON: SPWHY: NORMAL
FORWARDED TO LAB: SPWHR: NORMAL
SPECIMEN SENT: SPWT1: NORMAL